# Patient Record
Sex: FEMALE | Race: WHITE | Employment: FULL TIME | URBAN - METROPOLITAN AREA
[De-identification: names, ages, dates, MRNs, and addresses within clinical notes are randomized per-mention and may not be internally consistent; named-entity substitution may affect disease eponyms.]

---

## 2017-02-06 ENCOUNTER — HOSPITAL ENCOUNTER (OUTPATIENT)
Dept: OTHER | Age: 57
Discharge: HOME | End: 2017-02-06
Attending: INTERNAL MEDICINE | Admitting: INTERNAL MEDICINE

## 2017-02-06 LAB
ALBUMIN SERPL-MCNC: 4.4 GM/DL (ref 3.4–5)
ALP BLD-CCNC: 130 IU/L (ref 40–129)
ALT SERPL-CCNC: 115 U/L (ref 10–40)
ANION GAP SERPL CALCULATED.3IONS-SCNC: 15 MMOL/L (ref 4–16)
AST SERPL-CCNC: 72 IU/L (ref 15–37)
BILIRUB SERPL-MCNC: 0.3 MG/DL (ref 0–1)
BUN BLDV-MCNC: 18 MG/DL (ref 6–23)
CALCIUM SERPL-MCNC: 9.5 MG/DL (ref 8.3–10.6)
CHLORIDE BLD-SCNC: 101 MMOL/L (ref 99–110)
CO2: 27 MMOL/L (ref 21–32)
CREAT SERPL-MCNC: 0.7 MG/DL (ref 0.6–1.1)
FERRITIN: 116 NG/ML (ref 15–150)
GFR AFRICAN AMERICAN: >60 ML/MIN/1.73M2
GFR NON-AFRICAN AMERICAN: >60 ML/MIN/1.73M2
GLUCOSE FASTING: 84 MG/DL (ref 70–99)
POTASSIUM SERPL-SCNC: 4.9 MMOL/L (ref 3.5–5.1)
SODIUM BLD-SCNC: 143 MMOL/L (ref 135–145)
TOTAL PROTEIN: 7.3 GM/DL (ref 6.4–8.2)

## 2017-05-15 ENCOUNTER — HOSPITAL ENCOUNTER (OUTPATIENT)
Dept: OTHER | Age: 57
Discharge: HOME | End: 2017-05-15
Attending: INTERNAL MEDICINE | Admitting: INTERNAL MEDICINE

## 2017-05-15 LAB
ALBUMIN SERPL-MCNC: 4.1 GM/DL (ref 3.4–5)
ALP BLD-CCNC: 99 IU/L (ref 40–129)
ALT SERPL-CCNC: 57 U/L (ref 10–40)
ANION GAP SERPL CALCULATED.3IONS-SCNC: 15 MMOL/L (ref 4–16)
AST SERPL-CCNC: 46 IU/L (ref 15–37)
BILIRUB SERPL-MCNC: 0.2 MG/DL (ref 0–1)
BUN BLDV-MCNC: 20 MG/DL (ref 6–23)
CALCIUM SERPL-MCNC: 8.9 MG/DL (ref 8.3–10.6)
CHLORIDE BLD-SCNC: 104 MMOL/L (ref 99–110)
CO2: 25 MMOL/L (ref 21–32)
CREAT SERPL-MCNC: 0.6 MG/DL (ref 0.6–1.1)
FERRITIN: 69 NG/ML (ref 15–150)
GFR AFRICAN AMERICAN: >60 ML/MIN/1.73M2
GFR NON-AFRICAN AMERICAN: >60 ML/MIN/1.73M2
GLUCOSE BLD-MCNC: 101 MG/DL (ref 70–140)
POTASSIUM SERPL-SCNC: 4.3 MMOL/L (ref 3.5–5.1)
SODIUM BLD-SCNC: 144 MMOL/L (ref 135–145)
TOTAL PROTEIN: 6.7 GM/DL (ref 6.4–8.2)

## 2017-05-20 ENCOUNTER — EMPLOYEE WELLNESS (OUTPATIENT)
Dept: OTHER | Age: 57
End: 2017-05-20

## 2017-05-20 LAB
CHOLESTEROL: 206 MG/DL
GLUCOSE BLD-MCNC: 93 MG/DL (ref 70–140)
HDLC SERPL-MCNC: 58 MG/DL
LDL CHOLESTEROL CALCULATED: 134 MG/DL
PATIENT FASTING?: YES
TRIGL SERPL-MCNC: 68 MG/DL

## 2017-11-13 ENCOUNTER — HOSPITAL ENCOUNTER (OUTPATIENT)
Dept: WOMENS IMAGING | Age: 57
Discharge: HOME | End: 2017-11-13
Attending: OBSTETRICS & GYNECOLOGY | Admitting: OBSTETRICS & GYNECOLOGY

## 2017-11-13 DIAGNOSIS — Z12.31 VISIT FOR SCREENING MAMMOGRAM: ICD-10-CM

## 2017-11-14 ENCOUNTER — HOSPITAL ENCOUNTER (OUTPATIENT)
Dept: OTHER | Age: 57
Discharge: HOME | End: 2017-11-14
Attending: INTERNAL MEDICINE | Admitting: INTERNAL MEDICINE

## 2017-11-14 LAB
ALBUMIN SERPL-MCNC: 4.3 GM/DL (ref 3.4–5)
ALP BLD-CCNC: 113 IU/L (ref 40–129)
ALT SERPL-CCNC: 84 U/L (ref 10–40)
ANION GAP SERPL CALCULATED.3IONS-SCNC: 10 MMOL/L (ref 4–16)
AST SERPL-CCNC: 35 IU/L (ref 15–37)
BILIRUB SERPL-MCNC: 0.3 MG/DL (ref 0–1)
BUN BLDV-MCNC: 17 MG/DL (ref 6–23)
CALCIUM SERPL-MCNC: 9.4 MG/DL (ref 8.3–10.6)
CHLORIDE BLD-SCNC: 102 MMOL/L (ref 99–110)
CO2: 27 MMOL/L (ref 21–32)
CREAT SERPL-MCNC: 0.6 MG/DL (ref 0.6–1.1)
FERRITIN: 57 NG/ML (ref 15–150)
GFR AFRICAN AMERICAN: >60 ML/MIN/1.73M2
GFR NON-AFRICAN AMERICAN: >60 ML/MIN/1.73M2
GLUCOSE BLD-MCNC: 95 MG/DL (ref 70–140)
POTASSIUM SERPL-SCNC: 4.2 MMOL/L (ref 3.5–5.1)
SODIUM BLD-SCNC: 139 MMOL/L (ref 135–145)
TOTAL PROTEIN: 7 GM/DL (ref 6.4–8.2)

## 2017-11-21 ENCOUNTER — HOSPITAL ENCOUNTER (OUTPATIENT)
Dept: ULTRASOUND IMAGING | Age: 57
Discharge: HOME | End: 2017-11-21
Attending: OBSTETRICS & GYNECOLOGY | Admitting: OBSTETRICS & GYNECOLOGY

## 2017-11-21 DIAGNOSIS — R92.8 ABNORMAL MAMMOGRAM: ICD-10-CM

## 2018-01-22 ENCOUNTER — OFFICE VISIT (OUTPATIENT)
Dept: BARIATRICS/WEIGHT MGMT | Age: 58
End: 2018-01-22

## 2018-01-22 VITALS
DIASTOLIC BLOOD PRESSURE: 88 MMHG | HEART RATE: 93 BPM | HEIGHT: 64 IN | WEIGHT: 207.3 LBS | SYSTOLIC BLOOD PRESSURE: 130 MMHG | BODY MASS INDEX: 35.39 KG/M2

## 2018-01-22 DIAGNOSIS — E66.9 OBESITY WITH BODY MASS INDEX (BMI) OF 35.0 TO 39.9 WITHOUT COMORBIDITY: Primary | ICD-10-CM

## 2018-01-22 PROCEDURE — 99999 PR OFFICE/OUTPT VISIT,PROCEDURE ONLY: CPT

## 2018-01-22 RX ORDER — ASCORBIC ACID 500 MG
500 TABLET ORAL NIGHTLY
COMMUNITY
End: 2018-11-23

## 2018-01-31 ENCOUNTER — OFFICE VISIT (OUTPATIENT)
Dept: BARIATRICS/WEIGHT MGMT | Age: 58
End: 2018-01-31

## 2018-01-31 VITALS
BODY MASS INDEX: 34.81 KG/M2 | DIASTOLIC BLOOD PRESSURE: 77 MMHG | HEIGHT: 64 IN | SYSTOLIC BLOOD PRESSURE: 129 MMHG | HEART RATE: 83 BPM | WEIGHT: 203.9 LBS

## 2018-01-31 DIAGNOSIS — E66.9 OBESITY (BMI 35.0-39.9 WITHOUT COMORBIDITY): Primary | ICD-10-CM

## 2018-01-31 PROCEDURE — 99999 PR OFFICE/OUTPT VISIT,PROCEDURE ONLY: CPT

## 2018-02-12 ENCOUNTER — HOSPITAL ENCOUNTER (OUTPATIENT)
Dept: OTHER | Age: 58
Discharge: HOME | End: 2018-02-12
Attending: NURSE PRACTITIONER | Admitting: NURSE PRACTITIONER

## 2018-02-12 LAB
ALBUMIN SERPL-MCNC: 4.5 GM/DL (ref 3.4–5)
ALP BLD-CCNC: 72 IU/L (ref 40–129)
ALT SERPL-CCNC: 28 U/L (ref 10–40)
ANION GAP SERPL CALCULATED.3IONS-SCNC: 12 MMOL/L (ref 4–16)
AST SERPL-CCNC: 28 IU/L (ref 15–37)
BILIRUB SERPL-MCNC: 0.4 MG/DL (ref 0–1)
BUN BLDV-MCNC: 16 MG/DL (ref 6–23)
CALCIUM SERPL-MCNC: 9.3 MG/DL (ref 8.3–10.6)
CHLORIDE BLD-SCNC: 99 MMOL/L (ref 99–110)
CO2: 29 MMOL/L (ref 21–32)
CREAT SERPL-MCNC: 0.7 MG/DL (ref 0.6–1.1)
FERRITIN: 51 NG/ML (ref 15–150)
GFR AFRICAN AMERICAN: >60 ML/MIN/1.73M2
GFR NON-AFRICAN AMERICAN: >60 ML/MIN/1.73M2
GLUCOSE BLD-MCNC: 93 MG/DL (ref 70–99)
POTASSIUM SERPL-SCNC: 3.7 MMOL/L (ref 3.5–5.1)
SODIUM BLD-SCNC: 140 MMOL/L (ref 135–145)
TOTAL PROTEIN: 7.3 GM/DL (ref 6.4–8.2)

## 2018-02-14 ENCOUNTER — OFFICE VISIT (OUTPATIENT)
Dept: BARIATRICS/WEIGHT MGMT | Age: 58
End: 2018-02-14

## 2018-02-14 VITALS
BODY MASS INDEX: 34.02 KG/M2 | DIASTOLIC BLOOD PRESSURE: 82 MMHG | WEIGHT: 199.3 LBS | HEART RATE: 83 BPM | HEIGHT: 64 IN | SYSTOLIC BLOOD PRESSURE: 142 MMHG

## 2018-02-14 DIAGNOSIS — E66.9 OBESITY (BMI 30.0-34.9): Primary | ICD-10-CM

## 2018-02-14 PROCEDURE — 99999 PR OFFICE/OUTPT VISIT,PROCEDURE ONLY: CPT

## 2018-03-20 VITALS — WEIGHT: 199 LBS | BODY MASS INDEX: 34.16 KG/M2

## 2018-04-25 ENCOUNTER — HOSPITAL ENCOUNTER (OUTPATIENT)
Dept: GENERAL RADIOLOGY | Age: 58
Discharge: HOME | End: 2018-04-25
Attending: FAMILY MEDICINE | Admitting: FAMILY MEDICINE

## 2018-04-25 DIAGNOSIS — M54.50 ACUTE MIDLINE LOW BACK PAIN WITHOUT SCIATICA: ICD-10-CM

## 2018-04-25 DIAGNOSIS — M54.5 LEFT LOW BACK PAIN, UNSPECIFIED CHRONICITY, WITH SCIATICA PRESENCE UNSPECIFIED: ICD-10-CM

## 2018-05-07 ENCOUNTER — EMPLOYEE WELLNESS (OUTPATIENT)
Dept: OTHER | Age: 58
End: 2018-05-07

## 2018-05-07 LAB
CHOLESTEROL: 176 MG/DL
GLUCOSE BLD-MCNC: 79 MG/DL (ref 70–99)
HDLC SERPL-MCNC: 59 MG/DL
LDL CHOLESTEROL CALCULATED: 105 MG/DL
PATIENT FASTING?: YES
TRIGL SERPL-MCNC: 61 MG/DL

## 2018-05-14 VITALS — WEIGHT: 201 LBS | BODY MASS INDEX: 34.5 KG/M2

## 2018-06-20 ENCOUNTER — HOSPITAL ENCOUNTER (OUTPATIENT)
Dept: OTHER | Age: 58
Discharge: HOME | End: 2018-06-20
Attending: INTERNAL MEDICINE | Admitting: INTERNAL MEDICINE

## 2018-06-20 LAB
ALBUMIN SERPL-MCNC: 3.9 GM/DL (ref 3.4–5)
ALP BLD-CCNC: 87 IU/L (ref 40–129)
ALT SERPL-CCNC: 24 U/L (ref 10–40)
ANION GAP SERPL CALCULATED.3IONS-SCNC: 14 MMOL/L (ref 4–16)
AST SERPL-CCNC: 20 IU/L (ref 15–37)
BILIRUB SERPL-MCNC: 0.2 MG/DL (ref 0–1)
BUN BLDV-MCNC: 15 MG/DL (ref 6–23)
CALCIUM SERPL-MCNC: 9.1 MG/DL (ref 8.3–10.6)
CHLORIDE BLD-SCNC: 101 MMOL/L (ref 99–110)
CO2: 25 MMOL/L (ref 21–32)
CREAT SERPL-MCNC: 0.6 MG/DL (ref 0.6–1.1)
FERRITIN: 6 NG/ML (ref 15–150)
FOLATE: 16.8 NG/ML (ref 3.1–17.5)
GFR AFRICAN AMERICAN: >60 ML/MIN/1.73M2
GFR NON-AFRICAN AMERICAN: >60 ML/MIN/1.73M2
GLUCOSE BLD-MCNC: 115 MG/DL (ref 70–99)
IRON: 13 UG/DL (ref 37–145)
LACTATE DEHYDROGENASE: 220 IU/L (ref 120–246)
PCT TRANSFERRIN: 3 % (ref 10–44)
POTASSIUM SERPL-SCNC: 4.4 MMOL/L (ref 3.5–5.1)
SODIUM BLD-SCNC: 140 MMOL/L (ref 135–145)
TOTAL IRON BINDING CAPACITY: 403 UG/DL (ref 250–450)
TOTAL PROTEIN: 6.7 GM/DL (ref 6.4–8.2)
TSH HIGH SENSITIVITY: 1.2 UIU/ML (ref 0.27–4.2)
UNSATURATED IRON BINDING CAPACITY: 390 UG/DL (ref 110–370)
VITAMIN B-12: 390.8 PG/ML (ref 211–911)
VITAMIN D 25-HYDROXY: 21.85 NG/ML

## 2018-06-22 LAB — VITAMIN D 1,25-DIHYDROXY: 44.6

## 2018-06-26 ENCOUNTER — HOSPITAL ENCOUNTER (OUTPATIENT)
Dept: INFUSION THERAPY | Age: 58
Discharge: HOME | End: 2018-06-26
Attending: INTERNAL MEDICINE | Admitting: INTERNAL MEDICINE

## 2018-06-26 VITALS
OXYGEN SATURATION: 98 % | HEART RATE: 76 BPM | DIASTOLIC BLOOD PRESSURE: 80 MMHG | RESPIRATION RATE: 16 BRPM | SYSTOLIC BLOOD PRESSURE: 122 MMHG | TEMPERATURE: 98.6 F

## 2018-06-26 RX ORDER — ACETAMINOPHEN 325 MG/1
650 TABLET ORAL SEE ADMIN INSTRUCTIONS
Status: COMPLETED | OUTPATIENT
Start: 2018-06-26 | End: 2018-06-26

## 2018-06-26 RX ORDER — METHYLPREDNISOLONE SODIUM SUCCINATE 40 MG/ML
20 INJECTION, POWDER, LYOPHILIZED, FOR SOLUTION INTRAMUSCULAR; INTRAVENOUS ONCE
Status: COMPLETED | OUTPATIENT
Start: 2018-06-26 | End: 2018-06-26

## 2018-06-26 RX ORDER — DIPHENHYDRAMINE HCL 25 MG
25 TABLET ORAL SEE ADMIN INSTRUCTIONS
Status: COMPLETED | OUTPATIENT
Start: 2018-06-26 | End: 2018-06-26

## 2018-06-26 RX ORDER — FUROSEMIDE 10 MG/ML
20 INJECTION INTRAMUSCULAR; INTRAVENOUS SEE ADMIN INSTRUCTIONS
Status: DISCONTINUED | OUTPATIENT
Start: 2018-06-26 | End: 2018-06-27 | Stop reason: HOSPADM

## 2018-06-26 RX ORDER — 0.9 % SODIUM CHLORIDE 0.9 %
10 VIAL (ML) INJECTION PRN
Status: ACTIVE | OUTPATIENT
Start: 2018-06-26 | End: 2018-06-26

## 2018-06-26 RX ORDER — 0.9 % SODIUM CHLORIDE 0.9 %
250 INTRAVENOUS SOLUTION INTRAVENOUS ONCE
Status: COMPLETED | OUTPATIENT
Start: 2018-06-26 | End: 2018-06-26

## 2018-06-26 RX ADMIN — Medication 10 ML: at 08:50

## 2018-06-26 RX ADMIN — ACETAMINOPHEN 650 MG: 325 TABLET ORAL at 09:00

## 2018-06-26 RX ADMIN — Medication 10 ML: at 09:00

## 2018-06-26 RX ADMIN — Medication 25 MG: at 09:00

## 2018-06-26 RX ADMIN — Medication 250 ML: at 09:00

## 2018-06-26 RX ADMIN — METHYLPREDNISOLONE SODIUM SUCCINATE 20 MG: 40 INJECTION, POWDER, LYOPHILIZED, FOR SOLUTION INTRAMUSCULAR; INTRAVENOUS at 09:00

## 2018-06-26 ASSESSMENT — PAIN SCALES - GENERAL: PAINLEVEL_OUTOF10: 0

## 2018-07-05 ENCOUNTER — HOSPITAL ENCOUNTER (OUTPATIENT)
Dept: INFUSION THERAPY | Age: 58
Discharge: HOME | End: 2018-07-31
Attending: INTERNAL MEDICINE | Admitting: INTERNAL MEDICINE

## 2018-07-05 VITALS
HEART RATE: 77 BPM | RESPIRATION RATE: 16 BRPM | SYSTOLIC BLOOD PRESSURE: 138 MMHG | TEMPERATURE: 98.2 F | DIASTOLIC BLOOD PRESSURE: 84 MMHG

## 2018-07-05 RX ORDER — DEXAMETHASONE SODIUM PHOSPHATE 4 MG/ML
8 INJECTION, SOLUTION INTRA-ARTICULAR; INTRALESIONAL; INTRAMUSCULAR; INTRAVENOUS; SOFT TISSUE ONCE
Status: COMPLETED | OUTPATIENT
Start: 2018-07-05 | End: 2018-07-05

## 2018-07-05 RX ORDER — SODIUM CHLORIDE 0.9 % (FLUSH) 0.9 %
10 SYRINGE (ML) INJECTION PRN
Status: DISCONTINUED | OUTPATIENT
Start: 2018-07-05 | End: 2018-08-01 | Stop reason: HOSPADM

## 2018-07-05 RX ORDER — ACETAMINOPHEN 325 MG/1
650 TABLET ORAL ONCE
Status: COMPLETED | OUTPATIENT
Start: 2018-07-05 | End: 2018-07-05

## 2018-07-05 RX ADMIN — ACETAMINOPHEN 650 MG: 325 TABLET ORAL at 10:12

## 2018-07-05 RX ADMIN — DEXAMETHASONE SODIUM PHOSPHATE 8 MG: 4 INJECTION, SOLUTION INTRA-ARTICULAR; INTRALESIONAL; INTRAMUSCULAR; INTRAVENOUS; SOFT TISSUE at 10:12

## 2018-07-05 RX ADMIN — Medication 10 ML: at 12:00

## 2018-07-05 ASSESSMENT — PAIN SCALES - GENERAL: PAINLEVEL_OUTOF10: 0

## 2018-07-05 NOTE — PROGRESS NOTES
IV discontinued. DSD applied. Pt tolerated well. Discharged instructions given to pt, pt voiced understanding. Pt discharged via ambulatory by self to exit.

## 2018-07-11 ENCOUNTER — HOSPITAL ENCOUNTER (OUTPATIENT)
Dept: INFUSION THERAPY | Age: 58
Discharge: HOME | End: 2018-07-11
Attending: INTERNAL MEDICINE

## 2018-07-11 VITALS — RESPIRATION RATE: 16 BRPM | HEART RATE: 80 BPM | DIASTOLIC BLOOD PRESSURE: 89 MMHG | SYSTOLIC BLOOD PRESSURE: 137 MMHG

## 2018-07-11 RX ORDER — 0.9 % SODIUM CHLORIDE 0.9 %
10 VIAL (ML) INJECTION PRN
Status: DISCONTINUED | OUTPATIENT
Start: 2018-07-11 | End: 2018-07-12 | Stop reason: HOSPADM

## 2018-07-11 RX ORDER — ACETAMINOPHEN 325 MG/1
650 TABLET ORAL ONCE
Status: COMPLETED | OUTPATIENT
Start: 2018-07-11 | End: 2018-07-11

## 2018-07-11 RX ORDER — DEXAMETHASONE SODIUM PHOSPHATE 4 MG/ML
8 INJECTION, SOLUTION INTRA-ARTICULAR; INTRALESIONAL; INTRAMUSCULAR; INTRAVENOUS; SOFT TISSUE ONCE
Status: COMPLETED | OUTPATIENT
Start: 2018-07-11 | End: 2018-07-11

## 2018-07-11 RX ADMIN — Medication 10 ML: at 11:30

## 2018-07-11 RX ADMIN — ACETAMINOPHEN 650 MG: 325 TABLET ORAL at 09:15

## 2018-07-11 RX ADMIN — Medication 10 ML: at 09:20

## 2018-07-11 RX ADMIN — DEXAMETHASONE SODIUM PHOSPHATE 8 MG: 4 INJECTION, SOLUTION INTRA-ARTICULAR; INTRALESIONAL; INTRAMUSCULAR; INTRAVENOUS; SOFT TISSUE at 09:15

## 2018-07-11 RX ADMIN — Medication 10 ML: at 09:05

## 2018-07-11 ASSESSMENT — PAIN SCALES - GENERAL: PAINLEVEL_OUTOF10: 0

## 2018-07-25 ENCOUNTER — NURSE TRIAGE (OUTPATIENT)
Dept: OTHER | Facility: CLINIC | Age: 58
End: 2018-07-25

## 2018-08-01 ENCOUNTER — OFFICE VISIT (OUTPATIENT)
Dept: BARIATRICS/WEIGHT MGMT | Age: 58
End: 2018-08-01

## 2018-08-01 ENCOUNTER — HOSPITAL ENCOUNTER (OUTPATIENT)
Dept: OTHER | Age: 58
Discharge: HOME | End: 2018-08-31
Attending: INTERNAL MEDICINE | Admitting: INTERNAL MEDICINE

## 2018-08-01 VITALS
DIASTOLIC BLOOD PRESSURE: 84 MMHG | SYSTOLIC BLOOD PRESSURE: 128 MMHG | WEIGHT: 207.2 LBS | HEIGHT: 64 IN | HEART RATE: 85 BPM | BODY MASS INDEX: 35.37 KG/M2

## 2018-08-01 DIAGNOSIS — K21.00 GASTROESOPHAGEAL REFLUX DISEASE WITH ESOPHAGITIS: Primary | ICD-10-CM

## 2018-08-01 DIAGNOSIS — K44.9 HIATAL HERNIA: ICD-10-CM

## 2018-08-01 PROCEDURE — 99204 OFFICE O/P NEW MOD 45 MIN: CPT | Performed by: SURGERY

## 2018-08-01 RX ORDER — ESOMEPRAZOLE MAGNESIUM 20 MG/1
20 FOR SUSPENSION ORAL DAILY
COMMUNITY
End: 2018-10-02

## 2018-08-01 RX ORDER — ACETAMINOPHEN 325 MG/1
650 TABLET ORAL EVERY 6 HOURS PRN
COMMUNITY
End: 2018-10-02

## 2018-08-01 ASSESSMENT — ENCOUNTER SYMPTOMS
SORE THROAT: 0
TROUBLE SWALLOWING: 0
PHOTOPHOBIA: 0
CONSTIPATION: 0
ABDOMINAL PAIN: 1
COUGH: 0
NAUSEA: 0
DIARRHEA: 0
ANAL BLEEDING: 0
VOMITING: 0
BLOOD IN STOOL: 0
SHORTNESS OF BREATH: 0
COLOR CHANGE: 0
WHEEZING: 0
VOICE CHANGE: 0

## 2018-08-01 NOTE — PROGRESS NOTES
tracheal deviation present. No thyromegaly present. Cardiovascular: Normal rate, regular rhythm, normal heart sounds and intact distal pulses. Exam reveals no gallop and no friction rub. No murmur heard. Pulmonary/Chest: Effort normal. No stridor. No respiratory distress. She has no wheezes. She has no rales. She exhibits no tenderness. Abdominal: Soft. Bowel sounds are normal. She exhibits no distension and no mass. There is tenderness (Epigastric). There is no rebound and no guarding. Musculoskeletal: Normal range of motion. She exhibits no edema or tenderness. Lymphadenopathy:     She has no cervical adenopathy. Neurological: She is alert and oriented to person, place, and time. No cranial nerve deficit. Coordination normal.   Skin: Skin is warm and dry. No rash noted. She is not diaphoretic. No erythema. No pallor. Psychiatric: Her behavior is normal. Judgment and thought content normal.   Vitals reviewed. ASSESSMENT & PLAN:    1. Gastroesophageal reflux disease with esophagitis    2. Hiatal hernia         Presenting with Hiatal Hernia seen on EGD 4/8/16 with Dr. Vito Lopez. She has been having Anemia. She recently on 6/26/18 had a blood transfusion and 7/5/18, 7/11/18, and 7/18/18 had iron infusions. She does have a current referral out for a Thomason study with Dr. Marito Ferreira. Questioning if the capsule endoscopy would be useful, I think the hiatal hernia needs to be fixed for her GERD and SOB, then after IF anemia does NOT resolve then a capsule endoscopy would be useful. AND fix her umbilical hernia. Patient counseled on the risks, benefits, and alternatives of treatment plan at length while in the office today. Patient states an understanding and willingness to proceed with the plan. Follow Up:  Return for Follow up Symptoms, Surgery. Patient was seen with total face to face time of  40 minutes.  More than 50% of this visit was counseling and education as above in my assessment

## 2018-09-24 ENCOUNTER — OFFICE VISIT (OUTPATIENT)
Dept: BARIATRICS/WEIGHT MGMT | Age: 58
End: 2018-09-24

## 2018-09-24 VITALS
SYSTOLIC BLOOD PRESSURE: 132 MMHG | WEIGHT: 211.2 LBS | HEART RATE: 84 BPM | BODY MASS INDEX: 37.42 KG/M2 | DIASTOLIC BLOOD PRESSURE: 82 MMHG | HEIGHT: 63 IN

## 2018-09-24 PROCEDURE — 99999 PR OFFICE/OUTPT VISIT,PROCEDURE ONLY: CPT

## 2018-09-24 NOTE — PROGRESS NOTES
Outpatient Nutrition Counseling    REASON FOR VISIT: Pre-Op Diet Education    Chief Complaint:    Chief Complaint   Patient presents with    Weight Management       SUBJECTIVE:  Pt here to start 2 week liquid protein shake diet in preparation [facility]. Instructed pt on pre-op diet and suggested post-op diet progression. Provided handouts and recipes. Pt voiced understanding of all info. To start diet on . The patient is a 62 y.o. female being seen for GERD, planning HH surgery; Simona's, Height: 5' 3\" (160 cm), Weight: 211 lb 3.2 oz (95.8 kg), Current Body mass index is 37.41 kg/m². The patient's PCP is Efren Stoner MD     Comorbid Conditions:  Significant diseases affecting this patient are   Past Medical History:   Diagnosis Date    Anemia     History of blood transfusion     Hypertension     Iron deficiency    . Review of Systems - ROS  Otherwise per HPI. Allergies: Allergies   Allergen Reactions    Codeine        Past Surgical History:  Past Surgical History:   Procedure Laterality Date     SECTION      CHOLECYSTECTOMY      COLONOSCOPY  9-10-13    normal    TONSILLECTOMY         Family History:  Family History   Problem Relation Age of Onset    Hypertension Mother     Cancer Father         Lung    Cancer Sister         Spine       Social History:  Social History     Social History    Marital status:      Spouse name: N/A    Number of children: N/A    Years of education: N/A     Occupational History    Not on file.      Social History Main Topics    Smoking status: Never Smoker    Smokeless tobacco: Never Used    Alcohol use No    Drug use: No    Sexual activity: Not on file     Other Topics Concern    Not on file     Social History Narrative    No narrative on file         OBJECTIVE:  Physical Exam   /82 (Site: Right Upper Arm, Position: Sitting, Cuff Size: Large Adult)   Pulse 84   Ht 5' 3\" (1.6 m)   Wt 211 lb 3.2 oz

## 2018-09-25 ENCOUNTER — ANESTHESIA EVENT (OUTPATIENT)
Dept: OPERATING ROOM | Age: 58
DRG: 328 | End: 2018-09-25
Payer: COMMERCIAL

## 2018-10-02 ENCOUNTER — HOSPITAL ENCOUNTER (OUTPATIENT)
Dept: PREADMISSION TESTING | Age: 58
Discharge: HOME | End: 2018-10-06
Payer: COMMERCIAL

## 2018-10-02 VITALS
TEMPERATURE: 97.6 F | DIASTOLIC BLOOD PRESSURE: 78 MMHG | BODY MASS INDEX: 36.32 KG/M2 | RESPIRATION RATE: 16 BRPM | WEIGHT: 197.38 LBS | HEIGHT: 62 IN | HEART RATE: 82 BPM | SYSTOLIC BLOOD PRESSURE: 128 MMHG | OXYGEN SATURATION: 97 %

## 2018-10-02 LAB
ANION GAP SERPL CALCULATED.3IONS-SCNC: 14 MMOL/L (ref 4–16)
BUN BLDV-MCNC: 20 MG/DL (ref 6–23)
CALCIUM SERPL-MCNC: 9.7 MG/DL (ref 8.3–10.6)
CHLORIDE BLD-SCNC: 99 MMOL/L (ref 99–110)
CO2: 27 MMOL/L (ref 21–32)
CREAT SERPL-MCNC: 0.7 MG/DL (ref 0.6–1.1)
GFR AFRICAN AMERICAN: >60 ML/MIN/1.73M2
GFR NON-AFRICAN AMERICAN: >60 ML/MIN/1.73M2
GLUCOSE BLD-MCNC: 95 MG/DL (ref 70–99)
HCT VFR BLD CALC: 37.5 % (ref 37–47)
HEMOGLOBIN: 11.9 GM/DL (ref 12.5–16)
MCH RBC QN AUTO: 27.6 PG (ref 27–31)
MCHC RBC AUTO-ENTMCNC: 31.7 % (ref 32–36)
MCV RBC AUTO: 87 FL (ref 78–100)
PDW BLD-RTO: 16 % (ref 11.7–14.9)
PLATELET # BLD: 364 K/CU MM (ref 140–440)
PMV BLD AUTO: 8.7 FL (ref 7.5–11.1)
POTASSIUM SERPL-SCNC: 3.8 MMOL/L (ref 3.5–5.1)
RBC # BLD: 4.31 M/CU MM (ref 4.2–5.4)
SODIUM BLD-SCNC: 140 MMOL/L (ref 135–145)
WBC # BLD: 4.5 K/CU MM (ref 4–10.5)

## 2018-10-02 PROCEDURE — 85027 COMPLETE CBC AUTOMATED: CPT

## 2018-10-02 PROCEDURE — 80048 BASIC METABOLIC PNL TOTAL CA: CPT

## 2018-10-02 RX ORDER — HEPARIN SODIUM 5000 [USP'U]/ML
5000 INJECTION, SOLUTION INTRAVENOUS; SUBCUTANEOUS ONCE
Status: CANCELLED | OUTPATIENT
Start: 2018-10-11

## 2018-10-02 RX ORDER — FERROUS SULFATE 325(65) MG
325 TABLET ORAL NIGHTLY
COMMUNITY
End: 2018-11-23

## 2018-10-02 RX ORDER — HYDROCHLOROTHIAZIDE 25 MG/1
25 TABLET ORAL NIGHTLY
COMMUNITY

## 2018-10-02 RX ORDER — CEFAZOLIN SODIUM 2 G/100ML
2 INJECTION, SOLUTION INTRAVENOUS ONCE
Status: CANCELLED | OUTPATIENT
Start: 2018-10-11

## 2018-10-02 ASSESSMENT — ENCOUNTER SYMPTOMS: SHORTNESS OF BREATH: 1

## 2018-10-08 ENCOUNTER — OFFICE VISIT (OUTPATIENT)
Dept: BARIATRICS/WEIGHT MGMT | Age: 58
End: 2018-10-08
Payer: COMMERCIAL

## 2018-10-08 VITALS
HEART RATE: 92 BPM | DIASTOLIC BLOOD PRESSURE: 70 MMHG | WEIGHT: 189.8 LBS | BODY MASS INDEX: 33.63 KG/M2 | SYSTOLIC BLOOD PRESSURE: 130 MMHG | RESPIRATION RATE: 14 BRPM | HEIGHT: 63 IN

## 2018-10-08 DIAGNOSIS — Z01.818 PRE-OP EVALUATION: Primary | ICD-10-CM

## 2018-10-08 DIAGNOSIS — K44.9 HIATAL HERNIA: ICD-10-CM

## 2018-10-08 PROCEDURE — 99212 OFFICE O/P EST SF 10 MIN: CPT | Performed by: NURSE PRACTITIONER

## 2018-10-08 ASSESSMENT — ENCOUNTER SYMPTOMS
NAUSEA: 1
TROUBLE SWALLOWING: 0
SHORTNESS OF BREATH: 0
EYE PAIN: 0
RHINORRHEA: 0
ABDOMINAL DISTENTION: 0
CHEST TIGHTNESS: 0
DIARRHEA: 0
WHEEZING: 0
ABDOMINAL PAIN: 0

## 2018-10-08 NOTE — PROGRESS NOTES
Rober Granados  1960  62 y.o. SUBJECT SANDY:    Chief Complaint   Patient presents with    Weight Management     Weigh in     Past Medical History:   Diagnosis Date    Acid reflux     Anemia     Arthritis     \"Hands, Hips\"    Backache     \"Sometimes, I Go To A Chriopractor Once A Month\"    Hiatal hernia     History of blood transfusion Last Infusion     No Reaction To Blood Transfusions Received    Hypertension     Shortness of breath on exertion     Teeth missing     Upper And Lower    Wears glasses     [place] teeth extracted     4 [place] Teeth Extracted In Past     Past Surgical History:   Procedure Laterality Date     SECTION      CHOLECYSTECTOMY, LAPAROSCOPIC      COLONOSCOPY  Last Done In     DILATION AND CURETTAGE OF UTERUS  \"Between  And \"    ENDOSCOPY, COLON, DIAGNOSTIC  Last Done In    [place] 9938    WISDOM TOOTH EXTRACTION      4 [place] Teeth Extracted In Past     Current Outpatient Prescriptions   Medication Sig Dispense Refill    hydrochlorothiazide (HYDRODIURIL) 25 MG tablet Take 25 mg by mouth nightly      ferrous sulfate 325 (65 Fe) MG tablet Take 325 mg by mouth nightly Over The Counter      esomeprazole (NEXIUM 24HR) 20 MG delayed release capsule Take 20 mg by mouth nightly Over The Counter      Acetaminophen (TYLENOL PO) Take by mouth as needed Over The Counter      vitamin C (ASCORBIC ACID) 500 MG tablet Take 500 mg by mouth nightly Over The Counter      Multiple Vitamins-Minerals (MULTIVITAMIN PO) Take by mouth nightly Over The Counter       No current facility-administered medications for this visit.       Facility-Administered Medications Ordered in Other Visits   Medication Dose Route Frequency Provider Last Rate Last Dose    sodium chloride flush 0.9 % injection 10 mL  10 mL Intravenous PRN Jessy Brown MD   10 mL at 18 1100     Family History   Problem Relation Age of Onset    Hypertension oz (89.5 kg)   09/24/18 211 lb 3.2 oz (95.8 kg)       Physical Exam   Constitutional: She is oriented to person, place, and time. She appears well-developed and well-nourished. Obese   HENT:   Head: Normocephalic and atraumatic. Right Ear: Hearing and ear canal normal.   Left Ear: Hearing and ear canal normal.   Nose: Nose normal.   Mouth/Throat: Uvula is midline and oropharynx is clear and moist.   Eyes: Pupils are equal, round, and reactive to light. Conjunctivae are normal.   Neck: Normal range of motion. Cardiovascular: Normal rate, regular rhythm and normal heart sounds. Pulmonary/Chest: Effort normal and breath sounds normal. She has no decreased breath sounds. She has no wheezes. She has no rhonchi. She has no rales. Abdominal: Soft. Bowel sounds are normal. There is no tenderness. Musculoskeletal: Normal range of motion. She exhibits no tenderness. In all 4 extremities. Neurological: She is alert and oriented to person, place, and time. She has normal strength. She exhibits normal muscle tone. GCS eye subscore is 4. GCS verbal subscore is 5. GCS motor subscore is 6. Skin: Skin is warm and dry. No rash noted. Psychiatric: She has a normal mood and affect. Her behavior is normal. Judgment normal.   Nursing note and vitals reviewed. ASSESSMENT/ PLAN:    1. Pre-op evaluation  - Has lost 22 lbs thus far on diet, will continue. Discussed replacing 1 meal wth baked chicken (plain) and vegetable (plain). Having some intermittent nausea with shakes. - Discussed broth as well. 2. Hiatal hernia  - Scheduled for repair.   - Continue 2 week diet and discussed post surgical diet etc and weight loss. - Consent obtained and verified. No orders of the defined types were placed in this encounter. Return in about 3 days (around 10/11/2018).     Irl Ru, CNP

## 2018-10-11 ENCOUNTER — HOSPITAL ENCOUNTER (INPATIENT)
Age: 58
LOS: 1 days | Discharge: HOME | DRG: 328 | End: 2018-10-12
Attending: SURGERY | Admitting: SURGERY
Payer: COMMERCIAL

## 2018-10-11 ENCOUNTER — ANESTHESIA (OUTPATIENT)
Dept: OPERATING ROOM | Age: 58
DRG: 328 | End: 2018-10-11
Payer: COMMERCIAL

## 2018-10-11 VITALS
TEMPERATURE: 98.6 F | DIASTOLIC BLOOD PRESSURE: 69 MMHG | SYSTOLIC BLOOD PRESSURE: 119 MMHG | RESPIRATION RATE: 4 BRPM | OXYGEN SATURATION: 100 %

## 2018-10-11 DIAGNOSIS — K44.9 HIATAL HERNIA: Primary | ICD-10-CM

## 2018-10-11 PROCEDURE — 3600000019 HC SURGERY ROBOT ADDTL 15MIN: Performed by: SURGERY

## 2018-10-11 PROCEDURE — 6360000002 HC RX W HCPCS: Performed by: SURGERY

## 2018-10-11 PROCEDURE — 2580000003 HC RX 258

## 2018-10-11 PROCEDURE — 6370000000 HC RX 637 (ALT 250 FOR IP): Performed by: SURGERY

## 2018-10-11 PROCEDURE — 3700000000 HC ANESTHESIA ATTENDED CARE: Performed by: SURGERY

## 2018-10-11 PROCEDURE — C9113 INJ PANTOPRAZOLE SODIUM, VIA: HCPCS | Performed by: SURGERY

## 2018-10-11 PROCEDURE — 2500000003 HC RX 250 WO HCPCS: Performed by: SURGERY

## 2018-10-11 PROCEDURE — 2580000003 HC RX 258: Performed by: ANESTHESIOLOGY

## 2018-10-11 PROCEDURE — 3700000001 HC ADD 15 MINUTES (ANESTHESIA): Performed by: SURGERY

## 2018-10-11 PROCEDURE — 96372 THER/PROPH/DIAG INJ SC/IM: CPT

## 2018-10-11 PROCEDURE — 6360000002 HC RX W HCPCS

## 2018-10-11 PROCEDURE — 0WUF4JZ SUPPLEMENT ABDOMINAL WALL WITH SYNTHETIC SUBSTITUTE, PERCUTANEOUS ENDOSCOPIC APPROACH: ICD-10-PCS | Performed by: SURGERY

## 2018-10-11 PROCEDURE — 7100000001 HC PACU RECOVERY - ADDTL 15 MIN: Performed by: SURGERY

## 2018-10-11 PROCEDURE — 2580000003 HC RX 258: Performed by: SURGERY

## 2018-10-11 PROCEDURE — 2500000003 HC RX 250 WO HCPCS: Performed by: NURSE ANESTHETIST, CERTIFIED REGISTERED

## 2018-10-11 PROCEDURE — 0DV44ZZ RESTRICTION OF ESOPHAGOGASTRIC JUNCTION, PERCUTANEOUS ENDOSCOPIC APPROACH: ICD-10-PCS | Performed by: SURGERY

## 2018-10-11 PROCEDURE — 6360000002 HC RX W HCPCS: Performed by: ANESTHESIOLOGY

## 2018-10-11 PROCEDURE — 96376 TX/PRO/DX INJ SAME DRUG ADON: CPT

## 2018-10-11 PROCEDURE — 2500000003 HC RX 250 WO HCPCS

## 2018-10-11 PROCEDURE — 6360000002 HC RX W HCPCS: Performed by: NURSE ANESTHETIST, CERTIFIED REGISTERED

## 2018-10-11 PROCEDURE — 2709999900 HC NON-CHARGEABLE SUPPLY: Performed by: SURGERY

## 2018-10-11 PROCEDURE — G0378 HOSPITAL OBSERVATION PER HR: HCPCS

## 2018-10-11 PROCEDURE — 7100000000 HC PACU RECOVERY - FIRST 15 MIN: Performed by: SURGERY

## 2018-10-11 PROCEDURE — C9290 INJ, BUPIVACAINE LIPOSOME: HCPCS | Performed by: SURGERY

## 2018-10-11 PROCEDURE — 96374 THER/PROPH/DIAG INJ IV PUSH: CPT

## 2018-10-11 PROCEDURE — 3600000009 HC SURGERY ROBOT BASE: Performed by: SURGERY

## 2018-10-11 PROCEDURE — C1713 ANCHOR/SCREW BN/BN,TIS/BN: HCPCS | Performed by: SURGERY

## 2018-10-11 PROCEDURE — 8E0W4CZ ROBOTIC ASSISTED PROCEDURE OF TRUNK REGION, PERCUTANEOUS ENDOSCOPIC APPROACH: ICD-10-PCS | Performed by: SURGERY

## 2018-10-11 PROCEDURE — S2900 ROBOTIC SURGICAL SYSTEM: HCPCS | Performed by: SURGERY

## 2018-10-11 PROCEDURE — 1200000000 HC SEMI PRIVATE

## 2018-10-11 PROCEDURE — 43282 LAP PARAESOPH HER RPR W/MESH: CPT | Performed by: SURGERY

## 2018-10-11 DEVICE — MESH HERN W7XL10CM SYN TISS REINF BIOABSRB DISP BIO-A: Type: IMPLANTABLE DEVICE | Site: ABDOMEN | Status: FUNCTIONAL

## 2018-10-11 RX ORDER — FENTANYL CITRATE 50 UG/ML
INJECTION, SOLUTION INTRAMUSCULAR; INTRAVENOUS PRN
Status: DISCONTINUED | OUTPATIENT
Start: 2018-10-11 | End: 2018-10-11 | Stop reason: SDUPTHER

## 2018-10-11 RX ORDER — DEXAMETHASONE SODIUM PHOSPHATE 4 MG/ML
INJECTION, SOLUTION INTRA-ARTICULAR; INTRALESIONAL; INTRAMUSCULAR; INTRAVENOUS; SOFT TISSUE PRN
Status: DISCONTINUED | OUTPATIENT
Start: 2018-10-11 | End: 2018-10-11 | Stop reason: SDUPTHER

## 2018-10-11 RX ORDER — BACITRACIN 50000 [USP'U]/1
50000 INJECTION, POWDER, LYOPHILIZED, FOR SOLUTION INTRAMUSCULAR ONCE
Status: DISCONTINUED | OUTPATIENT
Start: 2018-10-11 | End: 2018-10-11 | Stop reason: CLARIF

## 2018-10-11 RX ORDER — ROCURONIUM BROMIDE 10 MG/ML
INJECTION, SOLUTION INTRAVENOUS PRN
Status: DISCONTINUED | OUTPATIENT
Start: 2018-10-11 | End: 2018-10-11 | Stop reason: SDUPTHER

## 2018-10-11 RX ORDER — PROPOFOL 10 MG/ML
INJECTION, EMULSION INTRAVENOUS PRN
Status: DISCONTINUED | OUTPATIENT
Start: 2018-10-11 | End: 2018-10-11 | Stop reason: SDUPTHER

## 2018-10-11 RX ORDER — GENTAMICIN SULFATE 40 MG/ML
80 INJECTION, SOLUTION INTRAMUSCULAR; INTRAVENOUS ONCE
Status: DISCONTINUED | OUTPATIENT
Start: 2018-10-11 | End: 2018-10-11 | Stop reason: CLARIF

## 2018-10-11 RX ORDER — MAGNESIUM SULFATE 1 G/100ML
1 INJECTION INTRAVENOUS PRN
Status: DISCONTINUED | OUTPATIENT
Start: 2018-10-11 | End: 2018-10-12 | Stop reason: HOSPADM

## 2018-10-11 RX ORDER — CEFAZOLIN SODIUM 2 G/100ML
2 INJECTION, SOLUTION INTRAVENOUS EVERY 8 HOURS
Status: COMPLETED | OUTPATIENT
Start: 2018-10-11 | End: 2018-10-12

## 2018-10-11 RX ORDER — HEPARIN SODIUM 5000 [USP'U]/ML
5000 INJECTION, SOLUTION INTRAVENOUS; SUBCUTANEOUS ONCE
Status: COMPLETED | OUTPATIENT
Start: 2018-10-11 | End: 2018-10-11

## 2018-10-11 RX ORDER — CEFAZOLIN SODIUM 2 G/100ML
2 INJECTION, SOLUTION INTRAVENOUS ONCE
Status: COMPLETED | OUTPATIENT
Start: 2018-10-11 | End: 2018-10-11

## 2018-10-11 RX ORDER — ACETAMINOPHEN 650 MG/1
650 SUPPOSITORY RECTAL EVERY 4 HOURS PRN
Status: DISCONTINUED | OUTPATIENT
Start: 2018-10-11 | End: 2018-10-12 | Stop reason: HOSPADM

## 2018-10-11 RX ORDER — ONDANSETRON 2 MG/ML
4 INJECTION INTRAMUSCULAR; INTRAVENOUS EVERY 4 HOURS PRN
Status: DISCONTINUED | OUTPATIENT
Start: 2018-10-11 | End: 2018-10-12 | Stop reason: HOSPADM

## 2018-10-11 RX ORDER — DOCUSATE SODIUM 100 MG/1
100 CAPSULE, LIQUID FILLED ORAL 2 TIMES DAILY
Status: DISCONTINUED | OUTPATIENT
Start: 2018-10-11 | End: 2018-10-12 | Stop reason: HOSPADM

## 2018-10-11 RX ORDER — HYDROMORPHONE HCL 110MG/55ML
1 PATIENT CONTROLLED ANALGESIA SYRINGE INTRAVENOUS
Status: DISCONTINUED | OUTPATIENT
Start: 2018-10-11 | End: 2018-10-12 | Stop reason: HOSPADM

## 2018-10-11 RX ORDER — DEXTROSE, SODIUM CHLORIDE, AND POTASSIUM CHLORIDE 5; .45; .15 G/100ML; G/100ML; G/100ML
INJECTION INTRAVENOUS CONTINUOUS
Status: DISCONTINUED | OUTPATIENT
Start: 2018-10-11 | End: 2018-10-12 | Stop reason: HOSPADM

## 2018-10-11 RX ORDER — BISACODYL 10 MG
10 SUPPOSITORY, RECTAL RECTAL DAILY PRN
Status: DISCONTINUED | OUTPATIENT
Start: 2018-10-11 | End: 2018-10-12 | Stop reason: HOSPADM

## 2018-10-11 RX ORDER — SODIUM CHLORIDE 0.9 % (FLUSH) 0.9 %
10 SYRINGE (ML) INJECTION EVERY 12 HOURS SCHEDULED
Status: DISCONTINUED | OUTPATIENT
Start: 2018-10-11 | End: 2018-10-12 | Stop reason: HOSPADM

## 2018-10-11 RX ORDER — SODIUM CHLORIDE, SODIUM LACTATE, POTASSIUM CHLORIDE, CALCIUM CHLORIDE 600; 310; 30; 20 MG/100ML; MG/100ML; MG/100ML; MG/100ML
INJECTION, SOLUTION INTRAVENOUS CONTINUOUS
Status: DISCONTINUED | OUTPATIENT
Start: 2018-10-11 | End: 2018-10-12 | Stop reason: HOSPADM

## 2018-10-11 RX ORDER — ACETAMINOPHEN 10 MG/ML
1000 INJECTION, SOLUTION INTRAVENOUS ONCE
Status: COMPLETED | OUTPATIENT
Start: 2018-10-11 | End: 2018-10-11

## 2018-10-11 RX ORDER — SODIUM CHLORIDE, SODIUM LACTATE, POTASSIUM CHLORIDE, CALCIUM CHLORIDE 600; 310; 30; 20 MG/100ML; MG/100ML; MG/100ML; MG/100ML
INJECTION, SOLUTION INTRAVENOUS
Status: COMPLETED
Start: 2018-10-11 | End: 2018-10-11

## 2018-10-11 RX ORDER — FENTANYL CITRATE 50 UG/ML
50 INJECTION, SOLUTION INTRAMUSCULAR; INTRAVENOUS EVERY 5 MIN PRN
Status: DISCONTINUED | OUTPATIENT
Start: 2018-10-11 | End: 2018-10-12 | Stop reason: HOSPADM

## 2018-10-11 RX ORDER — HYDROCHLOROTHIAZIDE 25 MG/1
25 TABLET ORAL NIGHTLY
Status: DISCONTINUED | OUTPATIENT
Start: 2018-10-11 | End: 2018-10-12 | Stop reason: HOSPADM

## 2018-10-11 RX ORDER — PANTOPRAZOLE SODIUM 40 MG/10ML
40 INJECTION, POWDER, LYOPHILIZED, FOR SOLUTION INTRAVENOUS 2 TIMES DAILY
Status: DISCONTINUED | OUTPATIENT
Start: 2018-10-11 | End: 2018-10-12 | Stop reason: HOSPADM

## 2018-10-11 RX ORDER — PROMETHAZINE HYDROCHLORIDE 25 MG/ML
12.5 INJECTION, SOLUTION INTRAMUSCULAR; INTRAVENOUS EVERY 6 HOURS PRN
Status: DISCONTINUED | OUTPATIENT
Start: 2018-10-11 | End: 2018-10-12 | Stop reason: HOSPADM

## 2018-10-11 RX ORDER — SODIUM CHLORIDE 0.9 % (FLUSH) 0.9 %
10 SYRINGE (ML) INJECTION PRN
Status: DISCONTINUED | OUTPATIENT
Start: 2018-10-11 | End: 2018-10-12 | Stop reason: HOSPADM

## 2018-10-11 RX ORDER — ACETAMINOPHEN 325 MG/1
650 TABLET ORAL EVERY 4 HOURS PRN
Status: DISCONTINUED | OUTPATIENT
Start: 2018-10-11 | End: 2018-10-12 | Stop reason: HOSPADM

## 2018-10-11 RX ORDER — ONDANSETRON 2 MG/ML
INJECTION INTRAMUSCULAR; INTRAVENOUS PRN
Status: DISCONTINUED | OUTPATIENT
Start: 2018-10-11 | End: 2018-10-11 | Stop reason: SDUPTHER

## 2018-10-11 RX ORDER — POTASSIUM CHLORIDE 7.45 MG/ML
10 INJECTION INTRAVENOUS PRN
Status: DISCONTINUED | OUTPATIENT
Start: 2018-10-11 | End: 2018-10-12 | Stop reason: HOSPADM

## 2018-10-11 RX ORDER — LIDOCAINE HYDROCHLORIDE 20 MG/ML
INJECTION, SOLUTION INFILTRATION; PERINEURAL PRN
Status: DISCONTINUED | OUTPATIENT
Start: 2018-10-11 | End: 2018-10-11 | Stop reason: SDUPTHER

## 2018-10-11 RX ADMIN — PHENYLEPHRINE HYDROCHLORIDE 100 MCG: 10 INJECTION INTRAVENOUS at 13:04

## 2018-10-11 RX ADMIN — FENTANYL CITRATE 100 MCG: 50 INJECTION INTRAMUSCULAR; INTRAVENOUS at 11:57

## 2018-10-11 RX ADMIN — DOCUSATE SODIUM 100 MG: 100 CAPSULE, LIQUID FILLED ORAL at 21:39

## 2018-10-11 RX ADMIN — PHENYLEPHRINE HYDROCHLORIDE 50 MCG: 10 INJECTION INTRAVENOUS at 13:19

## 2018-10-11 RX ADMIN — PHENYLEPHRINE HYDROCHLORIDE 100 MCG: 10 INJECTION INTRAVENOUS at 12:34

## 2018-10-11 RX ADMIN — FENTANYL CITRATE 50 MCG: 50 INJECTION, SOLUTION INTRAMUSCULAR; INTRAVENOUS at 14:35

## 2018-10-11 RX ADMIN — FENTANYL CITRATE 50 MCG: 50 INJECTION INTRAMUSCULAR; INTRAVENOUS at 13:31

## 2018-10-11 RX ADMIN — FENTANYL CITRATE 50 MCG: 50 INJECTION, SOLUTION INTRAMUSCULAR; INTRAVENOUS at 14:59

## 2018-10-11 RX ADMIN — PROPOFOL 160 MG: 10 INJECTION, EMULSION INTRAVENOUS at 11:42

## 2018-10-11 RX ADMIN — ROCURONIUM BROMIDE 5 MG: 50 INJECTION, SOLUTION INTRAVENOUS at 13:13

## 2018-10-11 RX ADMIN — SUGAMMADEX 200 MG: 100 INJECTION, SOLUTION INTRAVENOUS at 13:41

## 2018-10-11 RX ADMIN — Medication 4 MG: at 13:32

## 2018-10-11 RX ADMIN — FENTANYL CITRATE 100 MCG: 50 INJECTION INTRAMUSCULAR; INTRAVENOUS at 11:41

## 2018-10-11 RX ADMIN — ROCURONIUM BROMIDE 50 MG: 50 INJECTION, SOLUTION INTRAVENOUS at 11:42

## 2018-10-11 RX ADMIN — ROCURONIUM BROMIDE 5 MG: 50 INJECTION, SOLUTION INTRAVENOUS at 12:59

## 2018-10-11 RX ADMIN — ROCURONIUM BROMIDE 5 MG: 50 INJECTION, SOLUTION INTRAVENOUS at 13:26

## 2018-10-11 RX ADMIN — DEXTROSE MONOHYDRATE, SODIUM CHLORIDE, AND POTASSIUM CHLORIDE: 50; 4.5; 1.49 INJECTION, SOLUTION INTRAVENOUS at 14:55

## 2018-10-11 RX ADMIN — SODIUM CHLORIDE, POTASSIUM CHLORIDE, SODIUM LACTATE AND CALCIUM CHLORIDE: 600; 310; 30; 20 INJECTION, SOLUTION INTRAVENOUS at 13:10

## 2018-10-11 RX ADMIN — SODIUM CHLORIDE, POTASSIUM CHLORIDE, SODIUM LACTATE AND CALCIUM CHLORIDE: 600; 310; 30; 20 INJECTION, SOLUTION INTRAVENOUS at 10:40

## 2018-10-11 RX ADMIN — CEFAZOLIN SODIUM 2 G: 2 INJECTION, SOLUTION INTRAVENOUS at 11:48

## 2018-10-11 RX ADMIN — LIDOCAINE HYDROCHLORIDE 80 MG: 20 INJECTION, SOLUTION INFILTRATION; PERINEURAL at 11:42

## 2018-10-11 RX ADMIN — PANTOPRAZOLE SODIUM 40 MG: 40 INJECTION, POWDER, FOR SOLUTION INTRAVENOUS at 14:26

## 2018-10-11 RX ADMIN — HEPARIN SODIUM 5000 UNITS: 5000 INJECTION, SOLUTION INTRAVENOUS; SUBCUTANEOUS at 10:59

## 2018-10-11 RX ADMIN — ACETAMINOPHEN 1000 MG: 10 INJECTION, SOLUTION INTRAVENOUS at 14:37

## 2018-10-11 RX ADMIN — METRONIDAZOLE 500 MG: 500 INJECTION, SOLUTION INTRAVENOUS at 11:57

## 2018-10-11 RX ADMIN — CEFAZOLIN SODIUM 2 G: 2 INJECTION, SOLUTION INTRAVENOUS at 21:40

## 2018-10-11 RX ADMIN — PHENYLEPHRINE HYDROCHLORIDE 100 MCG: 10 INJECTION INTRAVENOUS at 12:30

## 2018-10-11 RX ADMIN — HYDROCHLOROTHIAZIDE 25 MG: 25 TABLET ORAL at 21:39

## 2018-10-11 RX ADMIN — DEXAMETHASONE SODIUM PHOSPHATE 8 MG: 4 INJECTION, SOLUTION INTRAMUSCULAR; INTRAVENOUS at 12:02

## 2018-10-11 RX ADMIN — PHENYLEPHRINE HYDROCHLORIDE 50 MCG: 10 INJECTION INTRAVENOUS at 13:10

## 2018-10-11 RX ADMIN — PANTOPRAZOLE SODIUM 40 MG: 40 INJECTION, POWDER, FOR SOLUTION INTRAVENOUS at 21:40

## 2018-10-11 ASSESSMENT — PULMONARY FUNCTION TESTS
PIF_VALUE: 25
PIF_VALUE: 31
PIF_VALUE: 29
PIF_VALUE: 25
PIF_VALUE: 1
PIF_VALUE: 29
PIF_VALUE: 30
PIF_VALUE: 30
PIF_VALUE: 16
PIF_VALUE: 28
PIF_VALUE: 31
PIF_VALUE: 1
PIF_VALUE: 8
PIF_VALUE: 29
PIF_VALUE: 32
PIF_VALUE: 11
PIF_VALUE: 11
PIF_VALUE: 16
PIF_VALUE: 28
PIF_VALUE: 30
PIF_VALUE: 31
PIF_VALUE: 31
PIF_VALUE: 30
PIF_VALUE: 22
PIF_VALUE: 30
PIF_VALUE: 30
PIF_VALUE: 21
PIF_VALUE: 30
PIF_VALUE: 25
PIF_VALUE: 29
PIF_VALUE: 30
PIF_VALUE: 16
PIF_VALUE: 23
PIF_VALUE: 13
PIF_VALUE: 21
PIF_VALUE: 15
PIF_VALUE: 17
PIF_VALUE: 21
PIF_VALUE: 23
PIF_VALUE: 27
PIF_VALUE: 16
PIF_VALUE: 31
PIF_VALUE: 31
PIF_VALUE: 29
PIF_VALUE: 30
PIF_VALUE: 24
PIF_VALUE: 22
PIF_VALUE: 16
PIF_VALUE: 32
PIF_VALUE: 23
PIF_VALUE: 24
PIF_VALUE: 16
PIF_VALUE: 31
PIF_VALUE: 0
PIF_VALUE: 30
PIF_VALUE: 23
PIF_VALUE: 29
PIF_VALUE: 31
PIF_VALUE: 22
PIF_VALUE: 1
PIF_VALUE: 2
PIF_VALUE: 30
PIF_VALUE: 32
PIF_VALUE: 16
PIF_VALUE: 16
PIF_VALUE: 23
PIF_VALUE: 23
PIF_VALUE: 32
PIF_VALUE: 32
PIF_VALUE: 23
PIF_VALUE: 29
PIF_VALUE: 22
PIF_VALUE: 29
PIF_VALUE: 32
PIF_VALUE: 30
PIF_VALUE: 24
PIF_VALUE: 31
PIF_VALUE: 29
PIF_VALUE: 16
PIF_VALUE: 30
PIF_VALUE: 16
PIF_VALUE: 30
PIF_VALUE: 18
PIF_VALUE: 24
PIF_VALUE: 21
PIF_VALUE: 15
PIF_VALUE: 24
PIF_VALUE: 23
PIF_VALUE: 29
PIF_VALUE: 0
PIF_VALUE: 29
PIF_VALUE: 25
PIF_VALUE: 30
PIF_VALUE: 22
PIF_VALUE: 24
PIF_VALUE: 24
PIF_VALUE: 29
PIF_VALUE: 31
PIF_VALUE: 33
PIF_VALUE: 24
PIF_VALUE: 30
PIF_VALUE: 28
PIF_VALUE: 29
PIF_VALUE: 30
PIF_VALUE: 29
PIF_VALUE: 3
PIF_VALUE: 1
PIF_VALUE: 30
PIF_VALUE: 21
PIF_VALUE: 30
PIF_VALUE: 30
PIF_VALUE: 24
PIF_VALUE: 30
PIF_VALUE: 21
PIF_VALUE: 28
PIF_VALUE: 31
PIF_VALUE: 30
PIF_VALUE: 23
PIF_VALUE: 31
PIF_VALUE: 30
PIF_VALUE: 30
PIF_VALUE: 1
PIF_VALUE: 1
PIF_VALUE: 30
PIF_VALUE: 0
PIF_VALUE: 30
PIF_VALUE: 32
PIF_VALUE: 0
PIF_VALUE: 18
PIF_VALUE: 30
PIF_VALUE: 16
PIF_VALUE: 32

## 2018-10-11 ASSESSMENT — PAIN SCALES - GENERAL
PAINLEVEL_OUTOF10: 0
PAINLEVEL_OUTOF10: 6
PAINLEVEL_OUTOF10: 6
PAINLEVEL_OUTOF10: 3
PAINLEVEL_OUTOF10: 4
PAINLEVEL_OUTOF10: 3

## 2018-10-11 ASSESSMENT — PAIN DESCRIPTION - PAIN TYPE
TYPE: SURGICAL PAIN

## 2018-10-11 ASSESSMENT — PAIN DESCRIPTION - LOCATION
LOCATION: SHOULDER

## 2018-10-11 ASSESSMENT — PAIN DESCRIPTION - DESCRIPTORS
DESCRIPTORS: DISCOMFORT
DESCRIPTORS: ACHING
DESCRIPTORS: ACHING
DESCRIPTORS: DISCOMFORT
DESCRIPTORS: DISCOMFORT

## 2018-10-11 ASSESSMENT — PAIN DESCRIPTION - FREQUENCY
FREQUENCY: CONTINUOUS

## 2018-10-11 ASSESSMENT — PAIN DESCRIPTION - ORIENTATION
ORIENTATION: RIGHT;LEFT

## 2018-10-11 ASSESSMENT — PAIN - FUNCTIONAL ASSESSMENT: PAIN_FUNCTIONAL_ASSESSMENT: 0-10

## 2018-10-11 NOTE — PROGRESS NOTES
HISTORY AND PHYSICAL EXAM    Date of Admission:  (Not on file)    PCP:  Hong Carroll MD    Chief Complaint / History of Present Illness:  Jodie Washburn is a very pleasant 62 y.o. female who presents for scheduled HH repair and nissen fundoplication for GERD and large HH noted on prior EGD. She successfully lost 22 lbs on 2 week liver shrinking diet. Continue NPO/Bowel rest, IV Fluids, Pain control, Nausea control, IV Antibiotics. PMH:   has a past medical history of Acid reflux; Anemia; Arthritis; Backache; Hiatal hernia; History of blood transfusion (Last Infusion ); Hypertension; Shortness of breath on exertion; Teeth missing; Wears glasses; and [place] teeth extracted. PSH:   has a past surgical history that includes  section (); Colonoscopy (Last Done In ); Endoscopy, colon, diagnostic (Last Done In ); [place] tooth extraction; Dilation and curettage of uterus (\"Between  And \"); Tonsillectomy and adenoidectomy (); and Cholecystectomy, laparoscopic (). Allergies: Allergies   Allergen Reactions    Codeine      \"Chest Pain\"    Ultram [Tramadol] Nausea Only        Home Meds:    Prior to Admission medications    Medication Sig Start Date End Date Taking?  Authorizing Provider   hydrochlorothiazide (HYDRODIURIL) 25 MG tablet Take 25 mg by mouth nightly    Historical Provider, MD   ferrous sulfate 325 (65 Fe) MG tablet Take 325 mg by mouth nightly Over The Counter    Historical Provider, MD   esomeprazole (NEXIUM 24HR) 20 MG delayed release capsule Take 20 mg by mouth nightly Over The Counter    Historical Provider, MD   Acetaminophen (TYLENOL PO) Take by mouth as needed Over The Counter    Historical Provider, MD   vitamin C (ASCORBIC ACID) 500 MG tablet Take 500 mg by mouth nightly Over The Counter    Historical Provider, MD   Multiple Vitamins-Minerals (MULTIVITAMIN PO) Take by mouth nightly Over The Counter    Historical Provider, MD for joint swelling and arthralgias. Skin: Warm and dry, well perfused. Neurological: Negative for seizures, syncope, speech difficulty and weakness. Hematological/Lymphatic: Negative for adenopathy. No history of DVT/PE. Does not bruise/bleed easily. Psychiatric/Behavioral: Negative for agitation. Physical Exam:  Vital Signs: There were no vitals filed for this visit. There is no height or weight on file to calculate BMI. General appearance: Pt Alert and oriented, in no apparent acute distress. HEENT:  DIMITRIOS, EOMI, No JVDs, Bruits, Megalies. Lungs: Clear to auscultation bilaterally. Heart: Regular rate and rhythm, S1, S2 normal, no murmur, rub or gallop. Abdomen: Non tender. Non distended. Positive bowel sounds. No hernias noted, no masses palpable. Extremities: Warm, well perfused, no cyanosis or edema. Skin: Skin color, texture, turgor normal.  Neurologic: Grossly normal, Cranial nerves from II to XII intact. Radiologic / Imaging / TESTING  No results found. Labs:    No results found for this or any previous visit (from the past 24 hour(s)). Diagnosis:  Patient Active Problem List   Diagnosis    Colon cancer screening    Iron deficiency anemia due to chronic blood loss    Gastroesophageal reflux disease with esophagitis    Hiatal hernia     4/11/2016:          Assessment & Plan:  Carina Thornton is a 62 y.o. female who presents today for scheduled robotic hiatal hernia repair and nissen fundoplication for GERD. Plan: Hiatal Hernia seen on EGD 4/8/16 with Dr. Geno Garcia. She has been having Anemia. She recently on 6/26/18 had a blood transfusion and 7/5/18, 7/11/18, and 7/18/18 had iron infusions. She needs hiatal herniorrhaphy, and I'll fix her umbilical hernia. Labs and HGB stable 11.9 on recent labs. Will proceed today with robotic hiatal hernia repair and nissen fundoplication. She successfully lost 22 lbs on 2 week liver shrinking diet.  Consent obtained and

## 2018-10-12 ENCOUNTER — CARE COORDINATION (OUTPATIENT)
Dept: CASE MANAGEMENT | Age: 58
End: 2018-10-12

## 2018-10-12 VITALS
HEIGHT: 62 IN | SYSTOLIC BLOOD PRESSURE: 99 MMHG | HEART RATE: 68 BPM | WEIGHT: 197 LBS | DIASTOLIC BLOOD PRESSURE: 56 MMHG | OXYGEN SATURATION: 94 % | RESPIRATION RATE: 18 BRPM | BODY MASS INDEX: 36.25 KG/M2 | TEMPERATURE: 97.9 F

## 2018-10-12 DIAGNOSIS — K44.9 HIATAL HERNIA: Primary | ICD-10-CM

## 2018-10-12 LAB
ANION GAP SERPL CALCULATED.3IONS-SCNC: 11 MMOL/L (ref 4–16)
BASOPHILS ABSOLUTE: 0 K/CU MM
BASOPHILS RELATIVE PERCENT: 0.3 % (ref 0–1)
BUN BLDV-MCNC: 8 MG/DL (ref 6–23)
CALCIUM SERPL-MCNC: 8.5 MG/DL (ref 8.3–10.6)
CHLORIDE BLD-SCNC: 100 MMOL/L (ref 99–110)
CO2: 25 MMOL/L (ref 21–32)
CREAT SERPL-MCNC: 0.6 MG/DL (ref 0.6–1.1)
DIFFERENTIAL TYPE: ABNORMAL
EOSINOPHILS ABSOLUTE: 0 K/CU MM
EOSINOPHILS RELATIVE PERCENT: 0.2 % (ref 0–3)
GFR AFRICAN AMERICAN: >60 ML/MIN/1.73M2
GFR NON-AFRICAN AMERICAN: >60 ML/MIN/1.73M2
GLUCOSE BLD-MCNC: 136 MG/DL (ref 70–99)
HCT VFR BLD CALC: 33.4 % (ref 37–47)
HEMOGLOBIN: 10.7 GM/DL (ref 12.5–16)
IMMATURE NEUTROPHIL %: 0.2 % (ref 0–0.43)
LYMPHOCYTES ABSOLUTE: 1.3 K/CU MM
LYMPHOCYTES RELATIVE PERCENT: 13.4 % (ref 24–44)
MCH RBC QN AUTO: 27.1 PG (ref 27–31)
MCHC RBC AUTO-ENTMCNC: 32 % (ref 32–36)
MCV RBC AUTO: 84.6 FL (ref 78–100)
MONOCYTES ABSOLUTE: 0.8 K/CU MM
MONOCYTES RELATIVE PERCENT: 8.4 % (ref 0–4)
NUCLEATED RBC %: 0 %
PDW BLD-RTO: 14.5 % (ref 11.7–14.9)
PLATELET # BLD: 331 K/CU MM (ref 140–440)
PMV BLD AUTO: 9.2 FL (ref 7.5–11.1)
POTASSIUM SERPL-SCNC: 3.6 MMOL/L (ref 3.5–5.1)
RBC # BLD: 3.95 M/CU MM (ref 4.2–5.4)
SEGMENTED NEUTROPHILS ABSOLUTE COUNT: 7.6 K/CU MM
SEGMENTED NEUTROPHILS RELATIVE PERCENT: 77.5 % (ref 36–66)
SODIUM BLD-SCNC: 136 MMOL/L (ref 135–145)
TOTAL IMMATURE NEUTOROPHIL: 0.02 K/CU MM
TOTAL NUCLEATED RBC: 0 K/CU MM
WBC # BLD: 9.8 K/CU MM (ref 4–10.5)

## 2018-10-12 PROCEDURE — 6370000000 HC RX 637 (ALT 250 FOR IP): Performed by: SURGERY

## 2018-10-12 PROCEDURE — G0008 ADMIN INFLUENZA VIRUS VAC: HCPCS | Performed by: SURGERY

## 2018-10-12 PROCEDURE — 80048 BASIC METABOLIC PNL TOTAL CA: CPT

## 2018-10-12 PROCEDURE — 36415 COLL VENOUS BLD VENIPUNCTURE: CPT

## 2018-10-12 PROCEDURE — 90686 IIV4 VACC NO PRSV 0.5 ML IM: CPT | Performed by: SURGERY

## 2018-10-12 PROCEDURE — 96376 TX/PRO/DX INJ SAME DRUG ADON: CPT

## 2018-10-12 PROCEDURE — C9113 INJ PANTOPRAZOLE SODIUM, VIA: HCPCS | Performed by: SURGERY

## 2018-10-12 PROCEDURE — 2500000003 HC RX 250 WO HCPCS: Performed by: SURGERY

## 2018-10-12 PROCEDURE — 85025 COMPLETE CBC W/AUTO DIFF WBC: CPT

## 2018-10-12 PROCEDURE — G0378 HOSPITAL OBSERVATION PER HR: HCPCS

## 2018-10-12 PROCEDURE — 6360000002 HC RX W HCPCS: Performed by: SURGERY

## 2018-10-12 PROCEDURE — 96372 THER/PROPH/DIAG INJ SC/IM: CPT

## 2018-10-12 RX ORDER — OXYCODONE HYDROCHLORIDE AND ACETAMINOPHEN 5; 325 MG/1; MG/1
1 TABLET ORAL EVERY 6 HOURS PRN
Qty: 16 TABLET | Refills: 0 | Status: SHIPPED | OUTPATIENT
Start: 2018-10-12 | End: 2018-10-16

## 2018-10-12 RX ORDER — AMOXICILLIN 250 MG
1 CAPSULE ORAL DAILY
Qty: 20 TABLET | Refills: 0 | Status: SHIPPED | OUTPATIENT
Start: 2018-10-12 | End: 2018-11-23

## 2018-10-12 RX ORDER — ONDANSETRON 4 MG/1
4 TABLET, FILM COATED ORAL EVERY 8 HOURS PRN
Qty: 21 TABLET | Refills: 0 | Status: SHIPPED | OUTPATIENT
Start: 2018-10-12 | End: 2018-11-23

## 2018-10-12 RX ADMIN — DEXTROSE MONOHYDRATE, SODIUM CHLORIDE, AND POTASSIUM CHLORIDE: 50; 4.5; 1.49 INJECTION, SOLUTION INTRAVENOUS at 00:21

## 2018-10-12 RX ADMIN — ENOXAPARIN SODIUM 40 MG: 40 INJECTION SUBCUTANEOUS at 09:28

## 2018-10-12 RX ADMIN — PANTOPRAZOLE SODIUM 40 MG: 40 INJECTION, POWDER, FOR SOLUTION INTRAVENOUS at 09:28

## 2018-10-12 RX ADMIN — ACETAMINOPHEN 650 MG: 325 TABLET ORAL at 07:09

## 2018-10-12 RX ADMIN — DOCUSATE SODIUM 100 MG: 100 CAPSULE, LIQUID FILLED ORAL at 09:28

## 2018-10-12 RX ADMIN — ACETAMINOPHEN 650 MG: 325 TABLET ORAL at 03:02

## 2018-10-12 RX ADMIN — CEFAZOLIN SODIUM 2 G: 2 INJECTION, SOLUTION INTRAVENOUS at 03:02

## 2018-10-12 RX ADMIN — INFLUENZA A VIRUS A/MICHIGAN/45/2015 X-275 (H1N1) ANTIGEN (FORMALDEHYDE INACTIVATED), INFLUENZA A VIRUS A/SINGAPORE/INFIMH-16-0019/2016 IVR-186 (H3N2) ANTIGEN (FORMALDEHYDE INACTIVATED), INFLUENZA B VIRUS B/PHUKET/3073/2013 ANTIGEN (FORMALDEHYDE INACTIVATED), AND INFLUENZA B VIRUS B/MARYLAND/15/2016 BX-69A ANTIGEN (FORMALDEHYDE INACTIVATED) 0.5 ML: 15; 15; 15; 15 INJECTION, SUSPENSION INTRAMUSCULAR at 11:50

## 2018-10-12 ASSESSMENT — PAIN DESCRIPTION - LOCATION
LOCATION: SHOULDER

## 2018-10-12 ASSESSMENT — PAIN DESCRIPTION - FREQUENCY
FREQUENCY: CONTINUOUS

## 2018-10-12 ASSESSMENT — PAIN DESCRIPTION - PROGRESSION
CLINICAL_PROGRESSION: NOT CHANGED
CLINICAL_PROGRESSION: GRADUALLY WORSENING
CLINICAL_PROGRESSION: GRADUALLY WORSENING

## 2018-10-12 ASSESSMENT — PAIN SCALES - GENERAL
PAINLEVEL_OUTOF10: 0
PAINLEVEL_OUTOF10: 6
PAINLEVEL_OUTOF10: 0
PAINLEVEL_OUTOF10: 6
PAINLEVEL_OUTOF10: 5
PAINLEVEL_OUTOF10: 0
PAINLEVEL_OUTOF10: 0

## 2018-10-12 ASSESSMENT — PAIN DESCRIPTION - PAIN TYPE
TYPE: SURGICAL PAIN;REFERRED PAIN

## 2018-10-12 ASSESSMENT — PAIN DESCRIPTION - ORIENTATION
ORIENTATION: RIGHT

## 2018-10-12 ASSESSMENT — PAIN DESCRIPTION - DESCRIPTORS
DESCRIPTORS: ACHING

## 2018-10-12 ASSESSMENT — PAIN DESCRIPTION - ONSET
ONSET: ON-GOING

## 2018-10-12 NOTE — PROGRESS NOTES
GENERAL SURGERY PROGRESS NOTE                Vitals:    10/11/18 1639 10/11/18 1715 10/12/18 0030 10/12/18 0618   BP: 129/68 124/73 (!) 120/58 (!) 103/54   Pulse: 80 77 65 74   Resp: 16 16 16 18   Temp: 97.8 °F (36.6 °C) 97.9 °F (36.6 °C) 97.8 °F (36.6 °C) 97.6 °F (36.4 °C)   TempSrc: Oral Oral Oral Oral   SpO2: 94% 94% 95%    Weight:       Height:         I/O last 3 completed shifts: In: 1750 [I.V.:1750]  Out: 450 [Urine:400; Blood:50]  No intake/output data recorded.     Diet NPO Effective Now Exceptions are: Ice Chips, Sips with Meds    Recent Results (from the past 48 hour(s))   Basic Metabolic Panel w/ Reflex to MG    Collection Time: 10/12/18  4:57 AM   Result Value Ref Range    Sodium 136 135 - 145 MMOL/L    Potassium 3.6 3.5 - 5.1 MMOL/L    Chloride 100 99 - 110 mMol/L    CO2 25 21 - 32 MMOL/L    Anion Gap 11 4 - 16    BUN 8 6 - 23 MG/DL    CREATININE 0.6 0.6 - 1.1 MG/DL    Glucose 136 (H) 70 - 99 MG/DL    Calcium 8.5 8.3 - 10.6 MG/DL    GFR Non-African American >60 >60 mL/min/1.73m2    GFR African American >60 >60 mL/min/1.73m2   CBC auto differential    Collection Time: 10/12/18  4:57 AM   Result Value Ref Range    WBC 9.8 4.0 - 10.5 K/CU MM    RBC 3.95 (L) 4.2 - 5.4 M/CU MM    Hemoglobin 10.7 (L) 12.5 - 16.0 GM/DL    Hematocrit 33.4 (L) 37 - 47 %    MCV 84.6 78 - 100 FL    MCH 27.1 27 - 31 PG    MCHC 32.0 32.0 - 36.0 %    RDW 14.5 11.7 - 14.9 %    Platelets 840 580 - 867 K/CU MM    MPV 9.2 7.5 - 11.1 FL    Differential Type AUTOMATED DIFFERENTIAL     Segs Relative 77.5 (H) 36 - 66 %    Lymphocytes % 13.4 (L) 24 - 44 %    Monocytes % 8.4 (H) 0 - 4 %    Eosinophils % 0.2 0 - 3 %    Basophils % 0.3 0 - 1 %    Segs Absolute 7.6 K/CU MM    Lymphocytes # 1.3 K/CU MM    Monocytes # 0.8 K/CU MM    Eosinophils # 0.0 K/CU MM    Basophils # 0.0 K/CU MM    Nucleated RBC % 0.0 %    Total Nucleated RBC 0.0 K/CU MM    Total Immature Neutrophil 0.02 K/CU MM    Immature Neutrophil % 0.2 0 - 0.43 %       Scheduled

## 2018-10-12 NOTE — CARE COORDINATION
Reviewed chart and spoke with pt/mother about discharge needs/plans. Pt lives alone but plans on going to her mother's home at discharge. PTA she was independent with ADL's /Transportation and works FT at [address] [place] [address]. Pt has a PCP and insurance that covers medications. She states can afford medications so far and aware of Med Assist is needed. Plan is discharge today. Patients white board updated and  card provided to pt/family.

## 2018-10-15 NOTE — PROGRESS NOTES
CLINICAL PHARMACY NOTE: MEDS TO [address] Select Patient?: Yes  Total # of Prescriptions Filled: 2   The following medications were delivered to the patient:  · Ondansetron 4mg  · Oxycodone/apap 5/325mg  Total # of Interventions Completed: 0  Time Spent (min): 15    Additional Documentation:

## 2018-10-19 ENCOUNTER — OFFICE VISIT (OUTPATIENT)
Dept: BARIATRICS/WEIGHT MGMT | Age: 58
End: 2018-10-19

## 2018-10-19 ENCOUNTER — CARE COORDINATION (OUTPATIENT)
Dept: CASE MANAGEMENT | Age: 58
End: 2018-10-19

## 2018-10-19 VITALS
DIASTOLIC BLOOD PRESSURE: 92 MMHG | SYSTOLIC BLOOD PRESSURE: 136 MMHG | BODY MASS INDEX: 33.88 KG/M2 | RESPIRATION RATE: 18 BRPM | HEART RATE: 84 BPM | WEIGHT: 191.2 LBS | HEIGHT: 63 IN

## 2018-10-19 DIAGNOSIS — Z98.890 STATUS POST LAPAROSCOPIC NISSEN FUNDOPLICATION: Primary | ICD-10-CM

## 2018-10-19 PROCEDURE — 99024 POSTOP FOLLOW-UP VISIT: CPT | Performed by: SURGERY

## 2018-10-19 NOTE — PROGRESS NOTES
Take 1 tablet by mouth daily While on narcotic pain medication please take. 20 tablet 0    ondansetron (ZOFRAN) 4 MG tablet Take 1 tablet by mouth every 8 hours as needed for Nausea or Vomiting 21 tablet 0    hydrochlorothiazide (HYDRODIURIL) 25 MG tablet Take 25 mg by mouth nightly      ferrous sulfate 325 (65 Fe) MG tablet Take 325 mg by mouth nightly Over The Counter      vitamin C (ASCORBIC ACID) 500 MG tablet Take 500 mg by mouth nightly Over The Counter      Multiple Vitamins-Minerals (MULTIVITAMIN PO) Take by mouth nightly Over The Counter       No current facility-administered medications for this visit. Facility-Administered Medications Ordered in Other Visits   Medication Dose Route Frequency Provider Last Rate Last Dose    sodium chloride flush 0.9 % injection 10 mL  10 mL Intravenous PRN Sosa Denson MD   10 mL at 07/18/18 1100      Allergies   Allergen Reactions    Codeine      \"Chest Pain\"    Ultram [Tramadol] Nausea Only           Review of Systems      OBJECTIVE:    BP (!) 136/92 (Site: Right Upper Arm, Position: Sitting, Cuff Size: Large Adult)   Pulse 84   Resp 18   Ht 5' 3\" (1.6 m)   Wt 191 lb 3.2 oz (86.7 kg)   BMI 33.87 kg/m²      Physical Exam      ASSESSMENT & PLAN:    1. Status post laparoscopic Nissen fundoplication         Doing very well, still on full liquid diet. Continue x 1 wk. Then regular diet as tolerated. No weight bearing. Patient counseled on the risks, benefits, and alternatives of treatment plan at length while in the office today. Patient states an understanding and willingness to proceed with the plan. Follow Up:  Return for PRN - As needed for any new symptom, Follow up Symptoms. Miguel Angel Howard MD, FACS, FICS. 10/19/18       Patient was seen with total face to face time of 25 minutes. More than 50% of this visit was counseling and education as above in my assessment and plan.

## 2018-10-19 NOTE — CARE COORDINATION
[facility] Transitions Follow Up Call    10/19/2018    Patient: Akua Caraballo  Patient : 1960   MRN: [id number]  Reason for Admission: There are no discharge diagnoses documented for the most recent discharge. Discharge Date: 10/12/18 RARS: Readmission Risk Score: 8       Spoke with:  patient    Care Transitions Subsequent and Final Call    Subsequent and Final Calls  Care Transitions Interventions  Other Interventions: Follow Up:  Spoke with patient. Patient reports that she has been to her follow up appointment with her surgeon today and is \"doing really well\". Reports that her incisions are healing well without s/s of infection. Reviewed infection prevention measures and s/s to report to MD.    Patient reports that she was instructed to continue with her liquid diet and is tolerating it well. Denies pain or nausea. Patient reports that her symptoms are well managed and that she is \"feeling great\". Encouraged patient to follow diet instructions and reminded her of Dietician available at surgeon's office for dietary support/education. Denies any questions, equipment or resource needs. Denied the need for further transition calls. Confirmed that patient has CTC contact information. Encouraged her to call if needs arise. No future appointments.     Morales Peoples RN

## 2018-10-30 ENCOUNTER — TELEPHONE (OUTPATIENT)
Dept: SURGERY | Age: 58
End: 2018-10-30

## 2018-11-23 ENCOUNTER — OFFICE VISIT (OUTPATIENT)
Dept: FAMILY MEDICINE CLINIC | Age: 58
End: 2018-11-23
Payer: COMMERCIAL

## 2018-11-23 VITALS
TEMPERATURE: 98.3 F | OXYGEN SATURATION: 98 % | HEART RATE: 85 BPM | HEIGHT: 62 IN | WEIGHT: 183 LBS | BODY MASS INDEX: 33.68 KG/M2 | SYSTOLIC BLOOD PRESSURE: 118 MMHG | DIASTOLIC BLOOD PRESSURE: 78 MMHG

## 2018-11-23 DIAGNOSIS — R39.9 UTI SYMPTOMS: Primary | ICD-10-CM

## 2018-11-23 DIAGNOSIS — R31.0 GROSS HEMATURIA: ICD-10-CM

## 2018-11-23 PROBLEM — N39.0 URINARY TRACT INFECTION WITH HEMATURIA: Status: ACTIVE | Noted: 2018-11-23

## 2018-11-23 PROBLEM — R31.9 URINARY TRACT INFECTION WITH HEMATURIA: Status: ACTIVE | Noted: 2018-11-23

## 2018-11-23 LAB
BILIRUBIN, POC: ABNORMAL
BLOOD URINE, POC: ABNORMAL
CLARITY, POC: ABNORMAL
COLOR, POC: ABNORMAL
GLUCOSE URINE, POC: NEGATIVE
KETONES, POC: ABNORMAL
LEUKOCYTE EST, POC: ABNORMAL
NITRITE, POC: NEGATIVE
PH, POC: 6.5
PROTEIN, POC: ABNORMAL
SPECIFIC GRAVITY, POC: >=1.03
UROBILINOGEN, POC: ABNORMAL

## 2018-11-23 PROCEDURE — 81002 URINALYSIS NONAUTO W/O SCOPE: CPT | Performed by: NURSE PRACTITIONER

## 2018-11-23 PROCEDURE — 99213 OFFICE O/P EST LOW 20 MIN: CPT | Performed by: NURSE PRACTITIONER

## 2018-11-23 RX ORDER — SULFAMETHOXAZOLE AND TRIMETHOPRIM 800; 160 MG/1; MG/1
1 TABLET ORAL 2 TIMES DAILY
Qty: 10 TABLET | Refills: 0 | Status: SHIPPED | OUTPATIENT
Start: 2018-11-23 | End: 2018-11-28

## 2018-11-23 ASSESSMENT — ENCOUNTER SYMPTOMS
SHORTNESS OF BREATH: 0
WHEEZING: 0
CHEST TIGHTNESS: 0
VOMITING: 0
NAUSEA: 0
COUGH: 0
ABDOMINAL PAIN: 0
BACK PAIN: 0

## 2018-11-23 ASSESSMENT — PATIENT HEALTH QUESTIONNAIRE - PHQ9
SUM OF ALL RESPONSES TO PHQ QUESTIONS 1-9: 0
SUM OF ALL RESPONSES TO PHQ QUESTIONS 1-9: 0
2. FEELING DOWN, DEPRESSED OR HOPELESS: 0
SUM OF ALL RESPONSES TO PHQ9 QUESTIONS 1 & 2: 0
1. LITTLE INTEREST OR PLEASURE IN DOING THINGS: 0

## 2018-11-23 NOTE — PROGRESS NOTES
06/20/2018             Results for orders placed or performed in visit on 11/23/18   POCT Urinalysis no Micro   Result Value Ref Range    Color, UA Brown     Clarity, UA Cloudy     Glucose, UA POC Negative     Bilirubin, UA Moderate     Ketones, UA Trace     Spec Grav, UA >=1.030     Blood, UA POC Large     pH, UA 6.5     Protein, UA POC >=300 mg/dL     Urobilinogen, UA 1.0 E.U./dL     Leukocytes, UA Large     Nitrite, UA Negative        ASSESSMENT AND PLAN:     1. UTI symptoms  - take prescribed medication as directed  - will call results of culture; will change antibiotic if needed  - increase fluids, rest  - POCT Urinalysis no Micro  - Urine Culture  - sulfamethoxazole-trimethoprim (BACTRIM DS;SEPTRA DS) 800-160 MG per tablet; Take 1 tablet by mouth 2 times daily for 5 days  Dispense: 10 tablet; Refill: 0    2. Gross hematuria  - complaint medication  - increase fluids, rest  - sulfamethoxazole-trimethoprim (BACTRIM DS;SEPTRA DS) 800-160 MG per tablet; Take 1 tablet by mouth 2 times daily for 5 days  Dispense: 10 tablet; Refill: 0      Return if symptoms worsen or fail to improve. Care discussed with patient. Patient educated on signs and symptoms of exacerbation and when to seek further medical attention. Advised to call for any problems, questions, or concerns. Patient verbalizes understanding and agrees with plan. Medications reviewed and reconciled. Continue current medications. Appropriate prescriptions are ordered. Risks and benefits of meds are discussed. After visit summary provided.

## 2018-11-23 NOTE — PATIENT INSTRUCTIONS
Increase fluids   Frequent urination  Complete antibiotic as directed  Will call results of culture  Verbalized understanding and agreement with plan

## 2018-11-24 LAB — URINE CULTURE, ROUTINE: NORMAL

## 2018-12-04 ENCOUNTER — HOSPITAL ENCOUNTER (OUTPATIENT)
Age: 58
Setting detail: SPECIMEN
Discharge: HOME | End: 2018-12-04
Payer: COMMERCIAL

## 2018-12-04 LAB
ALBUMIN SERPL-MCNC: 4.3 GM/DL (ref 3.4–5)
ALP BLD-CCNC: 86 IU/L (ref 40–129)
ALT SERPL-CCNC: 23 U/L (ref 10–40)
ANION GAP SERPL CALCULATED.3IONS-SCNC: 13 MMOL/L (ref 4–16)
AST SERPL-CCNC: 23 IU/L (ref 15–37)
BILIRUB SERPL-MCNC: 0.3 MG/DL (ref 0–1)
BUN BLDV-MCNC: 13 MG/DL (ref 6–23)
CALCIUM SERPL-MCNC: 9.6 MG/DL (ref 8.3–10.6)
CHLORIDE BLD-SCNC: 102 MMOL/L (ref 99–110)
CO2: 27 MMOL/L (ref 21–32)
CREAT SERPL-MCNC: 0.5 MG/DL (ref 0.6–1.1)
FERRITIN: 29 NG/ML (ref 15–150)
GFR AFRICAN AMERICAN: >60 ML/MIN/1.73M2
GFR NON-AFRICAN AMERICAN: >60 ML/MIN/1.73M2
GLUCOSE BLD-MCNC: 81 MG/DL (ref 70–99)
IRON: 48 UG/DL (ref 37–145)
PCT TRANSFERRIN: 15 % (ref 10–44)
POTASSIUM SERPL-SCNC: 4.6 MMOL/L (ref 3.5–5.1)
SODIUM BLD-SCNC: 142 MMOL/L (ref 135–145)
TOTAL IRON BINDING CAPACITY: 321 UG/DL (ref 250–450)
TOTAL PROTEIN: 7.2 GM/DL (ref 6.4–8.2)
UNSATURATED IRON BINDING CAPACITY: 273 UG/DL (ref 110–370)

## 2018-12-04 PROCEDURE — 83550 IRON BINDING TEST: CPT

## 2018-12-04 PROCEDURE — 82728 ASSAY OF FERRITIN: CPT

## 2018-12-04 PROCEDURE — 80053 COMPREHEN METABOLIC PANEL: CPT

## 2018-12-04 PROCEDURE — 83540 ASSAY OF IRON: CPT

## 2018-12-17 ENCOUNTER — HOSPITAL ENCOUNTER (OUTPATIENT)
Dept: WOMENS IMAGING | Age: 58
Discharge: HOME | End: 2018-12-17
Payer: COMMERCIAL

## 2018-12-17 DIAGNOSIS — Z12.31 SCREENING MAMMOGRAM, ENCOUNTER FOR: ICD-10-CM

## 2018-12-17 PROCEDURE — 77063 BREAST TOMOSYNTHESIS BI: CPT

## 2018-12-20 ENCOUNTER — HOSPITAL ENCOUNTER (OUTPATIENT)
Dept: INFUSION THERAPY | Age: 58
Setting detail: INFUSION SERIES
Discharge: HOME | End: 2018-12-20
Payer: COMMERCIAL

## 2018-12-20 ENCOUNTER — HOSPITAL ENCOUNTER (OUTPATIENT)
Dept: INFUSION THERAPY | Age: 58
Setting detail: INFUSION SERIES
End: 2018-12-20

## 2018-12-20 VITALS
HEART RATE: 72 BPM | TEMPERATURE: 98.4 F | RESPIRATION RATE: 14 BRPM | HEIGHT: 61 IN | DIASTOLIC BLOOD PRESSURE: 75 MMHG | SYSTOLIC BLOOD PRESSURE: 138 MMHG | WEIGHT: 183 LBS | OXYGEN SATURATION: 100 % | BODY MASS INDEX: 34.55 KG/M2

## 2018-12-20 PROCEDURE — 2580000003 HC RX 258: Performed by: INTERNAL MEDICINE

## 2018-12-20 PROCEDURE — 6370000000 HC RX 637 (ALT 250 FOR IP): Performed by: INTERNAL MEDICINE

## 2018-12-20 PROCEDURE — 96365 THER/PROPH/DIAG IV INF INIT: CPT

## 2018-12-20 PROCEDURE — 2580000003 HC RX 258

## 2018-12-20 PROCEDURE — 99211 OFF/OP EST MAY X REQ PHY/QHP: CPT

## 2018-12-20 PROCEDURE — 6360000002 HC RX W HCPCS: Performed by: INTERNAL MEDICINE

## 2018-12-20 PROCEDURE — 96366 THER/PROPH/DIAG IV INF ADDON: CPT

## 2018-12-20 RX ORDER — DEXAMETHASONE SODIUM PHOSPHATE 4 MG/ML
10 INJECTION, SOLUTION INTRA-ARTICULAR; INTRALESIONAL; INTRAMUSCULAR; INTRAVENOUS; SOFT TISSUE ONCE
Status: COMPLETED | OUTPATIENT
Start: 2018-12-20 | End: 2018-12-20

## 2018-12-20 RX ORDER — ACETAMINOPHEN 325 MG/1
650 TABLET ORAL ONCE
Status: COMPLETED | OUTPATIENT
Start: 2018-12-20 | End: 2018-12-20

## 2018-12-20 RX ORDER — 0.9 % SODIUM CHLORIDE 0.9 %
10 VIAL (ML) INJECTION PRN
Status: DISCONTINUED | OUTPATIENT
Start: 2018-12-20 | End: 2018-12-21 | Stop reason: HOSPADM

## 2018-12-20 RX ADMIN — DEXAMETHASONE SODIUM PHOSPHATE 10 MG: 4 INJECTION, SOLUTION INTRAMUSCULAR; INTRAVENOUS at 11:36

## 2018-12-20 RX ADMIN — IRON SUCROSE 300 MG: 20 INJECTION, SOLUTION INTRAVENOUS at 11:40

## 2018-12-20 RX ADMIN — ACETAMINOPHEN 650 MG: 325 TABLET ORAL at 11:35

## 2018-12-20 ASSESSMENT — PAIN SCALES - GENERAL: PAINLEVEL_OUTOF10: 0

## 2018-12-20 NOTE — PROGRESS NOTES
Reviewed discharge instructions, voiced understanding, and copies of AVS given to take home. Pt tolerated infusion well, with no s/s of an allergic reaction. Pt to private auto with steady gait.

## 2018-12-27 ENCOUNTER — HOSPITAL ENCOUNTER (OUTPATIENT)
Dept: INFUSION THERAPY | Age: 58
Setting detail: INFUSION SERIES
Discharge: HOME | End: 2018-12-27
Payer: COMMERCIAL

## 2018-12-27 VITALS
HEIGHT: 61 IN | SYSTOLIC BLOOD PRESSURE: 130 MMHG | TEMPERATURE: 98.4 F | BODY MASS INDEX: 34.55 KG/M2 | WEIGHT: 183 LBS | DIASTOLIC BLOOD PRESSURE: 89 MMHG | RESPIRATION RATE: 16 BRPM | HEART RATE: 80 BPM | OXYGEN SATURATION: 100 %

## 2018-12-27 PROCEDURE — 99211 OFF/OP EST MAY X REQ PHY/QHP: CPT

## 2018-12-27 PROCEDURE — 96365 THER/PROPH/DIAG IV INF INIT: CPT

## 2018-12-27 PROCEDURE — 6360000002 HC RX W HCPCS: Performed by: INTERNAL MEDICINE

## 2018-12-27 PROCEDURE — 2580000003 HC RX 258

## 2018-12-27 PROCEDURE — 6370000000 HC RX 637 (ALT 250 FOR IP): Performed by: INTERNAL MEDICINE

## 2018-12-27 PROCEDURE — 96366 THER/PROPH/DIAG IV INF ADDON: CPT

## 2018-12-27 PROCEDURE — 2580000003 HC RX 258: Performed by: INTERNAL MEDICINE

## 2018-12-27 RX ORDER — ACETAMINOPHEN 325 MG/1
650 TABLET ORAL ONCE
Status: COMPLETED | OUTPATIENT
Start: 2018-12-27 | End: 2018-12-27

## 2018-12-27 RX ORDER — DEXAMETHASONE SODIUM PHOSPHATE 4 MG/ML
10 INJECTION, SOLUTION INTRA-ARTICULAR; INTRALESIONAL; INTRAMUSCULAR; INTRAVENOUS; SOFT TISSUE ONCE
Status: COMPLETED | OUTPATIENT
Start: 2018-12-27 | End: 2018-12-27

## 2018-12-27 RX ADMIN — ACETAMINOPHEN 650 MG: 325 TABLET ORAL at 09:22

## 2018-12-27 RX ADMIN — DEXAMETHASONE SODIUM PHOSPHATE 10 MG: 4 INJECTION, SOLUTION INTRAMUSCULAR; INTRAVENOUS at 09:22

## 2018-12-27 RX ADMIN — IRON SUCROSE 300 MG: 20 INJECTION, SOLUTION INTRAVENOUS at 09:27

## 2018-12-27 ASSESSMENT — PAIN SCALES - GENERAL
PAINLEVEL_OUTOF10: 0
PAINLEVEL_OUTOF10: 0

## 2019-01-02 ENCOUNTER — HOSPITAL ENCOUNTER (OUTPATIENT)
Dept: INFUSION THERAPY | Age: 59
Setting detail: INFUSION SERIES
Discharge: HOME | End: 2019-01-02
Payer: COMMERCIAL

## 2019-01-02 VITALS
SYSTOLIC BLOOD PRESSURE: 128 MMHG | HEART RATE: 82 BPM | RESPIRATION RATE: 14 BRPM | DIASTOLIC BLOOD PRESSURE: 75 MMHG | OXYGEN SATURATION: 100 % | TEMPERATURE: 98.1 F

## 2019-01-02 PROCEDURE — 96374 THER/PROPH/DIAG INJ IV PUSH: CPT

## 2019-01-02 PROCEDURE — 96366 THER/PROPH/DIAG IV INF ADDON: CPT

## 2019-01-02 PROCEDURE — 2580000003 HC RX 258

## 2019-01-02 PROCEDURE — 2580000003 HC RX 258: Performed by: INTERNAL MEDICINE

## 2019-01-02 PROCEDURE — 6370000000 HC RX 637 (ALT 250 FOR IP): Performed by: INTERNAL MEDICINE

## 2019-01-02 PROCEDURE — 96365 THER/PROPH/DIAG IV INF INIT: CPT

## 2019-01-02 PROCEDURE — 99211 OFF/OP EST MAY X REQ PHY/QHP: CPT

## 2019-01-02 PROCEDURE — 6360000002 HC RX W HCPCS: Performed by: INTERNAL MEDICINE

## 2019-01-02 RX ORDER — DEXAMETHASONE SODIUM PHOSPHATE 4 MG/ML
10 INJECTION, SOLUTION INTRA-ARTICULAR; INTRALESIONAL; INTRAMUSCULAR; INTRAVENOUS; SOFT TISSUE ONCE
Status: DISCONTINUED | OUTPATIENT
Start: 2019-01-02 | End: 2019-01-02

## 2019-01-02 RX ORDER — ACETAMINOPHEN 325 MG/1
650 TABLET ORAL ONCE
Status: COMPLETED | OUTPATIENT
Start: 2019-01-02 | End: 2019-01-02

## 2019-01-02 RX ADMIN — ACETAMINOPHEN 650 MG: 325 TABLET ORAL at 08:31

## 2019-01-02 RX ADMIN — DEXAMETHASONE SODIUM PHOSPHATE 10 MG: 4 INJECTION, SOLUTION INTRA-ARTICULAR; INTRALESIONAL; INTRAMUSCULAR; INTRAVENOUS; SOFT TISSUE at 08:31

## 2019-01-02 RX ADMIN — IRON SUCROSE 300 MG: 20 INJECTION, SOLUTION INTRAVENOUS at 08:47

## 2019-01-02 ASSESSMENT — PAIN SCALES - GENERAL: PAINLEVEL_OUTOF10: 0

## 2019-01-02 NOTE — DISCHARGE SUMMARY
PT GIVEN DISCHARGE INSTRUCTIONS.  VOICE UNDERSTANDING OF CARE. TO RETURN TO SEE NEXT WEEK FOR ANOTHER IRON INFUSION.

## 2019-01-09 ENCOUNTER — HOSPITAL ENCOUNTER (OUTPATIENT)
Dept: INFUSION THERAPY | Age: 59
Setting detail: INFUSION SERIES
Discharge: HOME | End: 2019-01-09
Payer: COMMERCIAL

## 2019-01-09 VITALS
WEIGHT: 183 LBS | OXYGEN SATURATION: 97 % | RESPIRATION RATE: 16 BRPM | TEMPERATURE: 97.8 F | HEIGHT: 61 IN | BODY MASS INDEX: 34.55 KG/M2 | DIASTOLIC BLOOD PRESSURE: 81 MMHG | SYSTOLIC BLOOD PRESSURE: 135 MMHG | HEART RATE: 72 BPM

## 2019-01-09 PROCEDURE — 96374 THER/PROPH/DIAG INJ IV PUSH: CPT

## 2019-01-09 PROCEDURE — 6370000000 HC RX 637 (ALT 250 FOR IP): Performed by: INTERNAL MEDICINE

## 2019-01-09 PROCEDURE — 2580000003 HC RX 258

## 2019-01-09 PROCEDURE — 6360000002 HC RX W HCPCS: Performed by: INTERNAL MEDICINE

## 2019-01-09 PROCEDURE — 96365 THER/PROPH/DIAG IV INF INIT: CPT

## 2019-01-09 PROCEDURE — 2580000003 HC RX 258: Performed by: INTERNAL MEDICINE

## 2019-01-09 PROCEDURE — 99211 OFF/OP EST MAY X REQ PHY/QHP: CPT

## 2019-01-09 RX ORDER — ACETAMINOPHEN 325 MG/1
650 TABLET ORAL ONCE
Status: COMPLETED | OUTPATIENT
Start: 2019-01-09 | End: 2019-01-09

## 2019-01-09 RX ORDER — DEXAMETHASONE SODIUM PHOSPHATE 4 MG/ML
10 INJECTION, SOLUTION INTRA-ARTICULAR; INTRALESIONAL; INTRAMUSCULAR; INTRAVENOUS; SOFT TISSUE ONCE
Status: COMPLETED | OUTPATIENT
Start: 2019-01-09 | End: 2019-01-09

## 2019-01-09 RX ORDER — 0.9 % SODIUM CHLORIDE 0.9 %
10 VIAL (ML) INJECTION PRN
Status: DISCONTINUED | OUTPATIENT
Start: 2019-01-09 | End: 2019-01-10 | Stop reason: HOSPADM

## 2019-01-09 RX ADMIN — IRON SUCROSE 300 MG: 20 INJECTION, SOLUTION INTRAVENOUS at 08:35

## 2019-01-09 RX ADMIN — SODIUM CHLORIDE, PRESERVATIVE FREE 10 ML: 5 INJECTION INTRAVENOUS at 08:30

## 2019-01-09 RX ADMIN — ACETAMINOPHEN 650 MG: 325 TABLET ORAL at 08:35

## 2019-01-09 RX ADMIN — SODIUM CHLORIDE, PRESERVATIVE FREE 10 ML: 5 INJECTION INTRAVENOUS at 08:35

## 2019-01-09 RX ADMIN — DEXAMETHASONE SODIUM PHOSPHATE 10 MG: 4 INJECTION, SOLUTION INTRAMUSCULAR; INTRAVENOUS at 08:35

## 2019-01-09 RX ADMIN — SODIUM CHLORIDE, PRESERVATIVE FREE 10 ML: 5 INJECTION INTRAVENOUS at 10:35

## 2019-01-09 ASSESSMENT — PAIN SCALES - GENERAL: PAINLEVEL_OUTOF10: 0

## 2019-01-09 NOTE — DISCHARGE SUMMARY
Tolerated Venofer well. Discharge instructions explained written copy given. Understanding verbalized. Discharged home. Down to private auto per self  .

## 2019-01-09 NOTE — PLAN OF CARE
Ambulatory to unit room 2 for Venofer. Reorientated to unit. Procedure and plan of care explained. Questions answered. Understanding verbalized.

## 2019-02-04 ENCOUNTER — HOSPITAL ENCOUNTER (OUTPATIENT)
Age: 59
Discharge: HOME | End: 2019-02-04
Payer: COMMERCIAL

## 2019-02-04 ENCOUNTER — HOSPITAL ENCOUNTER (OUTPATIENT)
Dept: GENERAL RADIOLOGY | Age: 59
Discharge: HOME | End: 2019-02-04
Payer: COMMERCIAL

## 2019-02-04 DIAGNOSIS — Z09 FOLLOW UP: ICD-10-CM

## 2019-02-04 PROCEDURE — 74018 RADEX ABDOMEN 1 VIEW: CPT

## 2019-02-12 ENCOUNTER — HOSPITAL ENCOUNTER (OUTPATIENT)
Age: 59
Setting detail: SPECIMEN
Discharge: HOME | End: 2019-02-12
Payer: COMMERCIAL

## 2019-02-12 ENCOUNTER — TELEPHONE (OUTPATIENT)
Dept: BARIATRICS/WEIGHT MGMT | Age: 59
End: 2019-02-12

## 2019-02-12 DIAGNOSIS — M54.6 ACUTE MIDLINE THORACIC BACK PAIN: ICD-10-CM

## 2019-02-12 LAB
BASOPHILS ABSOLUTE: 0.1 K/CU MM
BASOPHILS RELATIVE PERCENT: 1.1 % (ref 0–1)
DIFFERENTIAL TYPE: ABNORMAL
EOSINOPHILS ABSOLUTE: 0.1 K/CU MM
EOSINOPHILS RELATIVE PERCENT: 1.6 % (ref 0–3)
FERRITIN: 415 NG/ML (ref 15–150)
FOLATE: >20 NG/ML (ref 3.1–17.5)
HCT VFR BLD CALC: 39.3 % (ref 37–47)
HEMOGLOBIN: 12.5 GM/DL (ref 12.5–16)
IMMATURE NEUTROPHIL %: 0.2 % (ref 0–0.43)
IRON: 68 UG/DL (ref 37–145)
LYMPHOCYTES ABSOLUTE: 1.7 K/CU MM
LYMPHOCYTES RELATIVE PERCENT: 27.8 % (ref 24–44)
MCH RBC QN AUTO: 28.1 PG (ref 27–31)
MCHC RBC AUTO-ENTMCNC: 31.8 % (ref 32–36)
MCV RBC AUTO: 88.3 FL (ref 78–100)
MONOCYTES ABSOLUTE: 0.5 K/CU MM
MONOCYTES RELATIVE PERCENT: 8.2 % (ref 0–4)
NUCLEATED RBC %: 0 %
PCT TRANSFERRIN: 26 % (ref 10–44)
PDW BLD-RTO: 15.9 % (ref 11.7–14.9)
PLATELET # BLD: 276 K/CU MM (ref 140–440)
PMV BLD AUTO: 9.8 FL (ref 7.5–11.1)
RBC # BLD: 4.45 M/CU MM (ref 4.2–5.4)
SEGMENTED NEUTROPHILS ABSOLUTE COUNT: 3.7 K/CU MM
SEGMENTED NEUTROPHILS RELATIVE PERCENT: 61.1 % (ref 36–66)
TOTAL IMMATURE NEUTOROPHIL: 0.01 K/CU MM
TOTAL IRON BINDING CAPACITY: 259 UG/DL (ref 250–450)
TOTAL NUCLEATED RBC: 0 K/CU MM
UNSATURATED IRON BINDING CAPACITY: 191 UG/DL (ref 110–370)
VITAMIN B-12: 695.7 PG/ML (ref 211–911)
WBC # BLD: 6.1 K/CU MM (ref 4–10.5)

## 2019-02-12 PROCEDURE — 82607 VITAMIN B-12: CPT

## 2019-02-12 PROCEDURE — 83540 ASSAY OF IRON: CPT

## 2019-02-12 PROCEDURE — 85025 COMPLETE CBC W/AUTO DIFF WBC: CPT

## 2019-02-12 PROCEDURE — 82728 ASSAY OF FERRITIN: CPT

## 2019-02-12 PROCEDURE — 82746 ASSAY OF FOLIC ACID SERUM: CPT

## 2019-02-12 PROCEDURE — 83550 IRON BINDING TEST: CPT

## 2019-02-20 ENCOUNTER — OFFICE VISIT (OUTPATIENT)
Dept: BARIATRICS/WEIGHT MGMT | Age: 59
End: 2019-02-20
Payer: COMMERCIAL

## 2019-02-20 VITALS
RESPIRATION RATE: 16 BRPM | SYSTOLIC BLOOD PRESSURE: 130 MMHG | DIASTOLIC BLOOD PRESSURE: 82 MMHG | HEART RATE: 72 BPM | WEIGHT: 180 LBS | BODY MASS INDEX: 34.01 KG/M2

## 2019-02-20 DIAGNOSIS — Z87.19 HISTORY OF REPAIR OF HIATAL HERNIA: Primary | ICD-10-CM

## 2019-02-20 DIAGNOSIS — D52.0 DIETARY FOLATE DEFICIENCY ANEMIA: ICD-10-CM

## 2019-02-20 DIAGNOSIS — D50.9 IRON DEFICIENCY ANEMIA, UNSPECIFIED IRON DEFICIENCY ANEMIA TYPE: ICD-10-CM

## 2019-02-20 DIAGNOSIS — Z98.890 HISTORY OF REPAIR OF HIATAL HERNIA: Primary | ICD-10-CM

## 2019-02-20 PROCEDURE — 99214 OFFICE O/P EST MOD 30 MIN: CPT | Performed by: SURGERY

## 2019-02-20 ASSESSMENT — ENCOUNTER SYMPTOMS
PHOTOPHOBIA: 0
COUGH: 0
COLOR CHANGE: 0
VOMITING: 0
ABDOMINAL PAIN: 0
VOICE CHANGE: 0
DIARRHEA: 0
NAUSEA: 0
WHEEZING: 0
BLOOD IN STOOL: 0
TROUBLE SWALLOWING: 0
SHORTNESS OF BREATH: 0
ANAL BLEEDING: 0
SORE THROAT: 0
CONSTIPATION: 0

## 2019-02-22 ENCOUNTER — TELEPHONE (OUTPATIENT)
Dept: BARIATRICS/WEIGHT MGMT | Age: 59
End: 2019-02-22

## 2019-02-22 PROBLEM — M54.6 ACUTE MIDLINE THORACIC BACK PAIN: Status: ACTIVE | Noted: 2019-02-22

## 2019-03-04 ENCOUNTER — HOSPITAL ENCOUNTER (OUTPATIENT)
Dept: CT IMAGING | Age: 59
Discharge: HOME | End: 2019-03-04
Payer: COMMERCIAL

## 2019-03-04 PROCEDURE — 74176 CT ABD & PELVIS W/O CONTRAST: CPT

## 2019-03-04 PROCEDURE — 6360000004 HC RX CONTRAST MEDICATION: Performed by: SURGERY

## 2019-03-04 RX ADMIN — IOHEXOL 50 ML: 240 INJECTION, SOLUTION INTRATHECAL; INTRAVASCULAR; INTRAVENOUS; ORAL at 10:15

## 2019-03-06 ENCOUNTER — OFFICE VISIT (OUTPATIENT)
Dept: BARIATRICS/WEIGHT MGMT | Age: 59
End: 2019-03-06
Payer: COMMERCIAL

## 2019-03-06 VITALS
BODY MASS INDEX: 31.04 KG/M2 | DIASTOLIC BLOOD PRESSURE: 70 MMHG | HEIGHT: 64 IN | SYSTOLIC BLOOD PRESSURE: 118 MMHG | RESPIRATION RATE: 16 BRPM | WEIGHT: 181.8 LBS | HEART RATE: 77 BPM

## 2019-03-06 DIAGNOSIS — D50.9 IRON DEFICIENCY ANEMIA, UNSPECIFIED IRON DEFICIENCY ANEMIA TYPE: Primary | ICD-10-CM

## 2019-03-06 PROCEDURE — 99214 OFFICE O/P EST MOD 30 MIN: CPT | Performed by: SURGERY

## 2019-03-06 ASSESSMENT — ENCOUNTER SYMPTOMS
CONSTIPATION: 0
ABDOMINAL PAIN: 0
VOMITING: 0
COLOR CHANGE: 0
SHORTNESS OF BREATH: 0
BLOOD IN STOOL: 0
VOICE CHANGE: 0
SORE THROAT: 0
COUGH: 0
DIARRHEA: 0
TROUBLE SWALLOWING: 0
ANAL BLEEDING: 0
NAUSEA: 0
PHOTOPHOBIA: 0
WHEEZING: 0

## 2019-04-08 ENCOUNTER — HOSPITAL ENCOUNTER (OUTPATIENT)
Age: 59
Setting detail: SPECIMEN
Discharge: HOME | End: 2019-04-08
Payer: COMMERCIAL

## 2019-04-08 LAB
ALBUMIN SERPL-MCNC: 4 GM/DL (ref 3.4–5)
ALP BLD-CCNC: 92 IU/L (ref 40–129)
ALT SERPL-CCNC: 32 U/L (ref 10–40)
ANION GAP SERPL CALCULATED.3IONS-SCNC: 11 MMOL/L (ref 4–16)
AST SERPL-CCNC: 23 IU/L (ref 15–37)
BILIRUB SERPL-MCNC: 0.3 MG/DL (ref 0–1)
BUN BLDV-MCNC: 14 MG/DL (ref 6–23)
CALCIUM SERPL-MCNC: 8.8 MG/DL (ref 8.3–10.6)
CHLORIDE BLD-SCNC: 102 MMOL/L (ref 99–110)
CO2: 28 MMOL/L (ref 21–32)
CREAT SERPL-MCNC: 0.8 MG/DL (ref 0.6–1.1)
FERRITIN: 303 NG/ML (ref 15–150)
GFR AFRICAN AMERICAN: >60 ML/MIN/1.73M2
GFR NON-AFRICAN AMERICAN: >60 ML/MIN/1.73M2
GLUCOSE BLD-MCNC: 89 MG/DL (ref 70–99)
IRON: 71 UG/DL (ref 37–145)
PCT TRANSFERRIN: 28 % (ref 10–44)
POTASSIUM SERPL-SCNC: 4.4 MMOL/L (ref 3.5–5.1)
SODIUM BLD-SCNC: 141 MMOL/L (ref 135–145)
TOTAL IRON BINDING CAPACITY: 254 UG/DL (ref 250–450)
TOTAL PROTEIN: 6.7 GM/DL (ref 6.4–8.2)
UNSATURATED IRON BINDING CAPACITY: 183 UG/DL (ref 110–370)

## 2019-04-08 PROCEDURE — 82728 ASSAY OF FERRITIN: CPT

## 2019-04-08 PROCEDURE — 83550 IRON BINDING TEST: CPT

## 2019-04-08 PROCEDURE — 83540 ASSAY OF IRON: CPT

## 2019-04-08 PROCEDURE — 80053 COMPREHEN METABOLIC PANEL: CPT

## 2019-05-23 ENCOUNTER — EMPLOYEE WELLNESS (OUTPATIENT)
Dept: OTHER | Age: 59
End: 2019-05-23

## 2019-05-23 LAB
CHOLESTEROL: 182 MG/DL
GLUCOSE BLD-MCNC: 87 MG/DL (ref 70–99)
HDLC SERPL-MCNC: 65 MG/DL
LDL CHOLESTEROL CALCULATED: 106 MG/DL
PATIENT FASTING?: NO
TRIGL SERPL-MCNC: 54 MG/DL

## 2019-05-28 VITALS — BODY MASS INDEX: 32.27 KG/M2 | WEIGHT: 188 LBS

## 2019-08-08 ENCOUNTER — HOSPITAL ENCOUNTER (OUTPATIENT)
Dept: GENERAL RADIOLOGY | Age: 59
Discharge: HOME | End: 2019-08-08
Payer: COMMERCIAL

## 2019-08-08 ENCOUNTER — HOSPITAL ENCOUNTER (OUTPATIENT)
Age: 59
Discharge: HOME | End: 2019-08-08
Payer: COMMERCIAL

## 2019-08-08 DIAGNOSIS — M70.71 BURSITIS OF RIGHT HIP, UNSPECIFIED BURSA: ICD-10-CM

## 2019-08-08 PROCEDURE — 73501 X-RAY EXAM HIP UNI 1 VIEW: CPT

## 2019-09-13 ENCOUNTER — APPOINTMENT (OUTPATIENT)
Dept: GENERAL RADIOLOGY | Age: 59
End: 2019-09-13
Payer: COMMERCIAL

## 2019-09-13 ENCOUNTER — HOSPITAL ENCOUNTER (EMERGENCY)
Age: 59
Discharge: HOME | End: 2019-09-13
Payer: COMMERCIAL

## 2019-09-13 VITALS
BODY MASS INDEX: 34.15 KG/M2 | SYSTOLIC BLOOD PRESSURE: 146 MMHG | DIASTOLIC BLOOD PRESSURE: 84 MMHG | WEIGHT: 200 LBS | HEIGHT: 64 IN | OXYGEN SATURATION: 99 % | TEMPERATURE: 98.2 F | RESPIRATION RATE: 18 BRPM | HEART RATE: 90 BPM

## 2019-09-13 DIAGNOSIS — S92.514A NONDISPLACED FRACTURE OF PROXIMAL PHALANX OF RIGHT LESSER TOE(S), INITIAL ENCOUNTER FOR CLOSED FRACTURE: Primary | ICD-10-CM

## 2019-09-13 DIAGNOSIS — S92.354A NONDISPLACED FRACTURE OF FIFTH METATARSAL BONE, RIGHT FOOT, INITIAL ENCOUNTER FOR CLOSED FRACTURE: ICD-10-CM

## 2019-09-13 PROCEDURE — 99283 EMERGENCY DEPT VISIT LOW MDM: CPT

## 2019-09-13 PROCEDURE — 4500000028 HC INTERMEDIATE PROCEDURE

## 2019-09-13 PROCEDURE — 6370000000 HC RX 637 (ALT 250 FOR IP): Performed by: PHYSICIAN ASSISTANT

## 2019-09-13 PROCEDURE — 73610 X-RAY EXAM OF ANKLE: CPT

## 2019-09-13 PROCEDURE — 73630 X-RAY EXAM OF FOOT: CPT

## 2019-09-13 RX ORDER — HYDROCODONE BITARTRATE AND ACETAMINOPHEN 5; 325 MG/1; MG/1
1 TABLET ORAL EVERY 6 HOURS PRN
Qty: 11 TABLET | Refills: 0 | Status: SHIPPED | OUTPATIENT
Start: 2019-09-13 | End: 2019-09-16

## 2019-09-13 RX ORDER — ACETAMINOPHEN 500 MG
1000 TABLET ORAL ONCE
Status: COMPLETED | OUTPATIENT
Start: 2019-09-13 | End: 2019-09-13

## 2019-09-13 RX ORDER — IBUPROFEN 600 MG/1
600 TABLET ORAL EVERY 6 HOURS PRN
Qty: 30 TABLET | Refills: 0 | Status: SHIPPED | OUTPATIENT
Start: 2019-09-13

## 2019-09-13 RX ORDER — ACETAMINOPHEN 500 MG
500 TABLET ORAL EVERY 6 HOURS PRN
Qty: 20 TABLET | Refills: 0 | Status: SHIPPED | OUTPATIENT
Start: 2019-09-13

## 2019-09-13 RX ORDER — IBUPROFEN 600 MG/1
600 TABLET ORAL ONCE
Status: COMPLETED | OUTPATIENT
Start: 2019-09-13 | End: 2019-09-13

## 2019-09-13 RX ADMIN — IBUPROFEN 600 MG: 600 TABLET, FILM COATED ORAL at 23:19

## 2019-09-13 RX ADMIN — ACETAMINOPHEN 1000 MG: 500 TABLET ORAL at 23:19

## 2019-09-13 ASSESSMENT — PAIN DESCRIPTION - LOCATION: LOCATION: FOOT

## 2019-09-13 ASSESSMENT — PAIN SCALES - GENERAL
PAINLEVEL_OUTOF10: 6
PAINLEVEL_OUTOF10: 6

## 2019-09-13 ASSESSMENT — PAIN DESCRIPTION - PAIN TYPE: TYPE: ACUTE PAIN

## 2019-09-14 NOTE — ED PROVIDER NOTES
CURETTAGE OF UTERUS  \"Between 2001 And 2007\"    ENDOSCOPY, COLON, DIAGNOSTIC  Last Done In 2016   [address]  10/11/2018    TX LAP ESOPHAGEAL LENGTHENING N/A 10/11/2018    NISSEN FUNDOPLICATION LAPAROSCOPIC ROBOTIC performed by Sarah Child MD at [address] [facility] EXTRACTION      4 [place] Teeth Extracted In Past       CURRENT MEDICATIONS    Current Outpatient Rx   Medication Sig Dispense Refill    ibuprofen (ADVIL;MOTRIN) 600 MG tablet Take 1 tablet by mouth every 6 hours as needed for Pain 30 tablet 0    acetaminophen (APAP EXTRA STRENGTH) 500 MG tablet Take 1 tablet by mouth every 6 hours as needed for Pain 20 tablet 0    HYDROcodone-acetaminophen (NORCO) 5-325 MG per tablet Take 1 tablet by mouth every 6 hours as needed for Pain for up to 3 days. 11 tablet 0    Ferrous Sulfate (IRON SUPPLEMENT PO) Take by mouth daily      hydrochlorothiazide (HYDRODIURIL) 25 MG tablet Take 25 mg by mouth nightly      Multiple Vitamins-Minerals (MULTIVITAMIN PO) Take by mouth nightly Over The Counter         ALLERGIES    Allergies   Allergen Reactions    Codeine      \"Chest Pain\"    Ultram [Tramadol] Nausea Only       SOCIAL & FAMILY HISTORY    Social History     Socioeconomic History    Marital status:       Spouse name: None    Number of children: None    Years of education: None    Highest education level: None   Occupational History    None   Social Needs    Financial resource strain: None    Food insecurity:     Worry: None     Inability: None    Transportation needs:     Medical: None     Non-medical: None   Tobacco Use    Smoking status: Never Smoker    Smokeless tobacco: Never Used   Substance and Sexual Activity    Alcohol use: No    Drug use: No    Sexual activity: None   Lifestyle    Physical activity:     Days per week: None     Minutes per session: None    Stress: None   Relationships    Social connections:     Talks on phone:

## 2019-09-16 ENCOUNTER — TELEPHONE (OUTPATIENT)
Dept: ORTHOPEDIC SURGERY | Age: 59
End: 2019-09-16

## 2019-09-20 ENCOUNTER — OFFICE VISIT (OUTPATIENT)
Dept: ORTHOPEDIC SURGERY | Age: 59
End: 2019-09-20
Payer: COMMERCIAL

## 2019-09-20 VITALS
RESPIRATION RATE: 14 BRPM | WEIGHT: 190 LBS | BODY MASS INDEX: 32.44 KG/M2 | HEIGHT: 64 IN | OXYGEN SATURATION: 97 % | HEART RATE: 98 BPM

## 2019-09-20 DIAGNOSIS — S92.354A NONDISPLACED FRACTURE OF FIFTH METATARSAL BONE, RIGHT FOOT, INITIAL ENCOUNTER FOR CLOSED FRACTURE: Primary | ICD-10-CM

## 2019-09-20 PROCEDURE — 28470 CLTX METATARSAL FX WO MNP EA: CPT | Performed by: PHYSICIAN ASSISTANT

## 2019-09-20 PROCEDURE — 99202 OFFICE O/P NEW SF 15 MIN: CPT | Performed by: PHYSICIAN ASSISTANT

## 2019-09-20 RX ORDER — FUROSEMIDE 40 MG/1
40 TABLET ORAL DAILY PRN
COMMUNITY

## 2019-09-20 ASSESSMENT — ENCOUNTER SYMPTOMS
EYES NEGATIVE: 1
GASTROINTESTINAL NEGATIVE: 1
RESPIRATORY NEGATIVE: 1

## 2019-09-20 NOTE — PATIENT INSTRUCTIONS
Patient fitted with short cam boot today  May bear weight on heel only while wearing boot  Off work until follow-up visit unless there is seated or sedentary work duty available  Follow-up in 2 weeks in the [facility] office in the morning for repeat x-ray, if x-ray shows fracture is healing we may begin to progress to weightbearing in the boot. Call [facility] office and request to speak with Lizzy Mcgrath with questions regarding short term disability paperwork.  Phone [phone]

## 2019-10-02 ENCOUNTER — OFFICE VISIT (OUTPATIENT)
Dept: ORTHOPEDIC SURGERY | Age: 59
End: 2019-10-02

## 2019-10-02 VITALS — HEIGHT: 64 IN | BODY MASS INDEX: 32.78 KG/M2 | WEIGHT: 192 LBS | RESPIRATION RATE: 16 BRPM

## 2019-10-02 DIAGNOSIS — S92.355D CLOSED NONDISPLACED FRACTURE OF FIFTH METATARSAL BONE OF LEFT FOOT WITH ROUTINE HEALING, SUBSEQUENT ENCOUNTER: Primary | ICD-10-CM

## 2019-10-02 PROCEDURE — 99024 POSTOP FOLLOW-UP VISIT: CPT | Performed by: PHYSICIAN ASSISTANT

## 2019-10-30 ENCOUNTER — OFFICE VISIT (OUTPATIENT)
Dept: ORTHOPEDIC SURGERY | Age: 59
End: 2019-10-30

## 2019-10-30 VITALS — HEIGHT: 64 IN | RESPIRATION RATE: 16 BRPM | WEIGHT: 195 LBS | BODY MASS INDEX: 33.29 KG/M2

## 2019-10-30 DIAGNOSIS — S92.355D CLOSED NONDISPLACED FRACTURE OF FIFTH METATARSAL BONE OF LEFT FOOT WITH ROUTINE HEALING, SUBSEQUENT ENCOUNTER: Primary | ICD-10-CM

## 2019-10-30 PROCEDURE — 99024 POSTOP FOLLOW-UP VISIT: CPT | Performed by: PHYSICIAN ASSISTANT

## 2019-11-11 ENCOUNTER — HOSPITAL ENCOUNTER (OUTPATIENT)
Age: 59
Setting detail: SPECIMEN
Discharge: HOME | End: 2019-11-11
Payer: COMMERCIAL

## 2019-11-11 LAB
ALBUMIN SERPL-MCNC: 4.5 GM/DL (ref 3.4–5)
ALP BLD-CCNC: 91 IU/L (ref 40–129)
ALT SERPL-CCNC: 33 U/L (ref 10–40)
ANION GAP SERPL CALCULATED.3IONS-SCNC: 10 MMOL/L (ref 4–16)
AST SERPL-CCNC: 23 IU/L (ref 15–37)
BILIRUB SERPL-MCNC: 0.3 MG/DL (ref 0–1)
BUN BLDV-MCNC: 17 MG/DL (ref 6–23)
CALCIUM SERPL-MCNC: 9.9 MG/DL (ref 8.3–10.6)
CHLORIDE BLD-SCNC: 101 MMOL/L (ref 99–110)
CO2: 29 MMOL/L (ref 21–32)
CREAT SERPL-MCNC: 0.9 MG/DL (ref 0.6–1.1)
FERRITIN: 326 NG/ML (ref 15–150)
GFR AFRICAN AMERICAN: >60 ML/MIN/1.73M2
GFR NON-AFRICAN AMERICAN: >60 ML/MIN/1.73M2
GLUCOSE BLD-MCNC: 75 MG/DL (ref 70–99)
IRON: 79 UG/DL (ref 37–145)
PCT TRANSFERRIN: 30 % (ref 10–44)
POTASSIUM SERPL-SCNC: 4.2 MMOL/L (ref 3.5–5.1)
SODIUM BLD-SCNC: 140 MMOL/L (ref 135–145)
TOTAL IRON BINDING CAPACITY: 266 UG/DL (ref 250–450)
TOTAL PROTEIN: 7.2 GM/DL (ref 6.4–8.2)
UNSATURATED IRON BINDING CAPACITY: 187 UG/DL (ref 110–370)

## 2019-11-11 PROCEDURE — 83550 IRON BINDING TEST: CPT

## 2019-11-11 PROCEDURE — 83540 ASSAY OF IRON: CPT

## 2019-11-11 PROCEDURE — 80053 COMPREHEN METABOLIC PANEL: CPT

## 2019-11-11 PROCEDURE — 82728 ASSAY OF FERRITIN: CPT

## 2019-12-26 ENCOUNTER — HOSPITAL ENCOUNTER (OUTPATIENT)
Dept: WOMENS IMAGING | Age: 59
Discharge: HOME | End: 2019-12-26
Payer: COMMERCIAL

## 2019-12-26 DIAGNOSIS — Z12.31 SCREENING MAMMOGRAM, ENCOUNTER FOR: ICD-10-CM

## 2019-12-26 DIAGNOSIS — M81.0 OSTEOPOROSIS, UNSPECIFIED OSTEOPOROSIS TYPE, UNSPECIFIED PATHOLOGICAL FRACTURE PRESENCE: ICD-10-CM

## 2019-12-26 PROCEDURE — 77080 DXA BONE DENSITY AXIAL: CPT

## 2019-12-26 PROCEDURE — 77063 BREAST TOMOSYNTHESIS BI: CPT

## 2020-01-06 ENCOUNTER — HOSPITAL ENCOUNTER (OUTPATIENT)
Dept: ULTRASOUND IMAGING | Age: 60
Discharge: HOME | End: 2020-01-06
Payer: COMMERCIAL

## 2020-01-06 ENCOUNTER — HOSPITAL ENCOUNTER (OUTPATIENT)
Dept: WOMENS IMAGING | Age: 60
Discharge: HOME | End: 2020-01-06
Payer: COMMERCIAL

## 2020-01-06 PROCEDURE — 77065 DX MAMMO INCL CAD UNI: CPT

## 2020-01-06 PROCEDURE — 76642 ULTRASOUND BREAST LIMITED: CPT

## 2020-07-08 ENCOUNTER — HOSPITAL ENCOUNTER (OUTPATIENT)
Dept: ULTRASOUND IMAGING | Age: 60
Discharge: HOME | End: 2020-07-08
Payer: COMMERCIAL

## 2020-07-08 PROCEDURE — 76642 ULTRASOUND BREAST LIMITED: CPT

## 2020-07-09 ENCOUNTER — HOSPITAL ENCOUNTER (OUTPATIENT)
Age: 60
Discharge: HOME | End: 2020-07-09
Payer: COMMERCIAL

## 2020-07-09 LAB
BASOPHILS ABSOLUTE: 0.1 K/CU MM
BASOPHILS RELATIVE PERCENT: 1.3 % (ref 0–1)
DIFFERENTIAL TYPE: ABNORMAL
EOSINOPHILS ABSOLUTE: 0.1 K/CU MM
EOSINOPHILS RELATIVE PERCENT: 2.2 % (ref 0–3)
FERRITIN: 273 NG/ML (ref 15–150)
HCT VFR BLD CALC: 39.6 % (ref 37–47)
HEMOGLOBIN: 12.4 GM/DL (ref 12.5–16)
IMMATURE NEUTROPHIL %: 0.2 % (ref 0–0.43)
IRON: 60 UG/DL (ref 37–145)
LYMPHOCYTES ABSOLUTE: 1.6 K/CU MM
LYMPHOCYTES RELATIVE PERCENT: 24.8 % (ref 24–44)
MCH RBC QN AUTO: 28.5 PG (ref 27–31)
MCHC RBC AUTO-ENTMCNC: 31.3 % (ref 32–36)
MCV RBC AUTO: 91 FL (ref 78–100)
MONOCYTES ABSOLUTE: 0.4 K/CU MM
MONOCYTES RELATIVE PERCENT: 6.3 % (ref 0–4)
NUCLEATED RBC %: 0 %
PDW BLD-RTO: 12.7 % (ref 11.7–14.9)
PLATELET # BLD: 278 K/CU MM (ref 140–440)
PMV BLD AUTO: 9.9 FL (ref 7.5–11.1)
RBC # BLD: 4.35 M/CU MM (ref 4.2–5.4)
SEGMENTED NEUTROPHILS ABSOLUTE COUNT: 4.1 K/CU MM
SEGMENTED NEUTROPHILS RELATIVE PERCENT: 65.2 % (ref 36–66)
TOTAL IMMATURE NEUTOROPHIL: 0.01 K/CU MM
TOTAL NUCLEATED RBC: 0 K/CU MM
WBC # BLD: 6.3 K/CU MM (ref 4–10.5)

## 2020-07-09 PROCEDURE — 83540 ASSAY OF IRON: CPT

## 2020-07-09 PROCEDURE — 85025 COMPLETE CBC W/AUTO DIFF WBC: CPT

## 2020-07-09 PROCEDURE — 36415 COLL VENOUS BLD VENIPUNCTURE: CPT

## 2020-07-09 PROCEDURE — 82728 ASSAY OF FERRITIN: CPT

## 2020-09-23 ENCOUNTER — EMPLOYEE WELLNESS (OUTPATIENT)
Dept: OTHER | Age: 60
End: 2020-09-23

## 2020-09-23 LAB
CHOLESTEROL: 181 MG/DL
GLUCOSE BLD-MCNC: 89 MG/DL (ref 70–99)
HDLC SERPL-MCNC: 59 MG/DL
LDL CHOLESTEROL CALCULATED: 105 MG/DL
PATIENT FASTING?: YES
TRIGL SERPL-MCNC: 84 MG/DL

## 2020-12-29 ENCOUNTER — HOSPITAL ENCOUNTER (OUTPATIENT)
Dept: WOMENS IMAGING | Age: 60
Discharge: HOME | End: 2020-12-29
Payer: COMMERCIAL

## 2020-12-29 PROCEDURE — G0279 TOMOSYNTHESIS, MAMMO: HCPCS

## 2021-05-26 ENCOUNTER — HOSPITAL ENCOUNTER (OUTPATIENT)
Age: 61
Discharge: HOME | End: 2021-05-26
Payer: COMMERCIAL

## 2021-05-26 LAB
ANION GAP SERPL CALCULATED.3IONS-SCNC: 11 MMOL/L (ref 4–16)
BASOPHILS ABSOLUTE: 0.1 K/CU MM
BASOPHILS RELATIVE PERCENT: 0.8 % (ref 0–1)
BUN BLDV-MCNC: 18 MG/DL (ref 6–23)
CALCIUM SERPL-MCNC: 9.2 MG/DL (ref 8.3–10.6)
CHLORIDE BLD-SCNC: 105 MMOL/L (ref 99–110)
CHOLESTEROL: 164 MG/DL
CO2: 24 MMOL/L (ref 21–32)
CREAT SERPL-MCNC: 0.6 MG/DL (ref 0.6–1.1)
DIFFERENTIAL TYPE: ABNORMAL
EOSINOPHILS ABSOLUTE: 0.1 K/CU MM
EOSINOPHILS RELATIVE PERCENT: 1.6 % (ref 0–3)
FERRITIN: 227 NG/ML (ref 15–150)
GFR AFRICAN AMERICAN: >60 ML/MIN/1.73M2
GFR NON-AFRICAN AMERICAN: >60 ML/MIN/1.73M2
GLUCOSE BLD-MCNC: 97 MG/DL (ref 70–99)
HCT VFR BLD CALC: 38.1 % (ref 37–47)
HDLC SERPL-MCNC: 53 MG/DL
HEMOGLOBIN: 12.1 GM/DL (ref 12.5–16)
IMMATURE NEUTROPHIL %: 0.3 % (ref 0–0.43)
IRON: 59 UG/DL (ref 37–145)
LDL CHOLESTEROL DIRECT: 97 MG/DL
LYMPHOCYTES ABSOLUTE: 2.1 K/CU MM
LYMPHOCYTES RELATIVE PERCENT: 27.1 % (ref 24–44)
MCH RBC QN AUTO: 28.5 PG (ref 27–31)
MCHC RBC AUTO-ENTMCNC: 31.8 % (ref 32–36)
MCV RBC AUTO: 89.9 FL (ref 78–100)
MONOCYTES ABSOLUTE: 0.6 K/CU MM
MONOCYTES RELATIVE PERCENT: 7.5 % (ref 0–4)
NUCLEATED RBC %: 0 %
PDW BLD-RTO: 12.5 % (ref 11.7–14.9)
PLATELET # BLD: 291 K/CU MM (ref 140–440)
PMV BLD AUTO: 10.2 FL (ref 7.5–11.1)
POTASSIUM SERPL-SCNC: 4.2 MMOL/L (ref 3.5–5.1)
RBC # BLD: 4.24 M/CU MM (ref 4.2–5.4)
SEGMENTED NEUTROPHILS ABSOLUTE COUNT: 4.7 K/CU MM
SEGMENTED NEUTROPHILS RELATIVE PERCENT: 62.7 % (ref 36–66)
SODIUM BLD-SCNC: 140 MMOL/L (ref 135–145)
TOTAL IMMATURE NEUTOROPHIL: 0.02 K/CU MM
TOTAL NUCLEATED RBC: 0 K/CU MM
TRIGL SERPL-MCNC: 140 MG/DL
WBC # BLD: 7.6 K/CU MM (ref 4–10.5)

## 2021-05-26 PROCEDURE — 36415 COLL VENOUS BLD VENIPUNCTURE: CPT

## 2021-05-26 PROCEDURE — 83721 ASSAY OF BLOOD LIPOPROTEIN: CPT

## 2021-05-26 PROCEDURE — 80061 LIPID PANEL: CPT

## 2021-05-26 PROCEDURE — 85025 COMPLETE CBC W/AUTO DIFF WBC: CPT

## 2021-05-26 PROCEDURE — 83540 ASSAY OF IRON: CPT

## 2021-05-26 PROCEDURE — 80048 BASIC METABOLIC PNL TOTAL CA: CPT

## 2021-05-26 PROCEDURE — 82728 ASSAY OF FERRITIN: CPT

## 2021-08-17 LAB
CHOLESTEROL: 199 MG/DL
GLUCOSE BLD-MCNC: 90 MG/DL (ref 70–99)
HDLC SERPL-MCNC: 67 MG/DL
LDL CHOLESTEROL CALCULATED: 116 MG/DL
PATIENT FASTING?: YES
TRIGL SERPL-MCNC: 78 MG/DL

## 2022-07-11 ENCOUNTER — HOSPITAL ENCOUNTER (OUTPATIENT)
Dept: WOMENS IMAGING | Age: 62
Discharge: HOME | End: 2022-07-11
Payer: COMMERCIAL

## 2022-07-11 DIAGNOSIS — Z12.31 ENCOUNTER FOR SCREENING MAMMOGRAM FOR MALIGNANT NEOPLASM OF BREAST: ICD-10-CM

## 2022-07-11 PROCEDURE — 77063 BREAST TOMOSYNTHESIS BI: CPT

## 2022-10-03 ENCOUNTER — HOSPITAL ENCOUNTER (OUTPATIENT)
Age: 62
Discharge: HOME | End: 2022-10-03
Payer: COMMERCIAL

## 2022-10-03 LAB
ANION GAP SERPL CALCULATED.3IONS-SCNC: 9 MMOL/L (ref 4–16)
BASOPHILS ABSOLUTE: 0 K/CU MM
BASOPHILS RELATIVE PERCENT: 0.6 % (ref 0–1)
BUN BLDV-MCNC: 15 MG/DL (ref 6–23)
CALCIUM SERPL-MCNC: 9.6 MG/DL (ref 8.3–10.6)
CHLORIDE BLD-SCNC: 101 MMOL/L (ref 99–110)
CHOLESTEROL: 213 MG/DL
CO2: 28 MMOL/L (ref 21–32)
CREAT SERPL-MCNC: 0.5 MG/DL (ref 0.6–1.1)
DIFFERENTIAL TYPE: ABNORMAL
EOSINOPHILS ABSOLUTE: 0.1 K/CU MM
EOSINOPHILS RELATIVE PERCENT: 2 % (ref 0–3)
FERRITIN: 237 NG/ML (ref 15–150)
GFR AFRICAN AMERICAN: >60 ML/MIN/1.73M2
GFR NON-AFRICAN AMERICAN: >60 ML/MIN/1.73M2
GLUCOSE BLD-MCNC: 94 MG/DL (ref 70–99)
HCT VFR BLD CALC: 36.3 % (ref 37–47)
HDLC SERPL-MCNC: 63 MG/DL
HEMOGLOBIN: 11.9 GM/DL (ref 12.5–16)
IMMATURE NEUTROPHIL %: 0.3 % (ref 0–0.43)
IRON: 75 UG/DL (ref 37–145)
LDL CHOLESTEROL CALCULATED: 134 MG/DL
LYMPHOCYTES ABSOLUTE: 2.1 K/CU MM
LYMPHOCYTES RELATIVE PERCENT: 33.5 % (ref 24–44)
MCH RBC QN AUTO: 28.9 PG (ref 27–31)
MCHC RBC AUTO-ENTMCNC: 32.8 % (ref 32–36)
MCV RBC AUTO: 88.1 FL (ref 78–100)
MONOCYTES ABSOLUTE: 0.4 K/CU MM
MONOCYTES RELATIVE PERCENT: 5.9 % (ref 0–4)
NUCLEATED RBC %: 0 %
PCT TRANSFERRIN: 28 % (ref 10–44)
PDW BLD-RTO: 12.6 % (ref 11.7–14.9)
PLATELET # BLD: 293 K/CU MM (ref 140–440)
PMV BLD AUTO: 9.3 FL (ref 7.5–11.1)
POTASSIUM SERPL-SCNC: 3.9 MMOL/L (ref 3.5–5.1)
RBC # BLD: 4.12 M/CU MM (ref 4.2–5.4)
SEGMENTED NEUTROPHILS ABSOLUTE COUNT: 3.7 K/CU MM
SEGMENTED NEUTROPHILS RELATIVE PERCENT: 57.7 % (ref 36–66)
SODIUM BLD-SCNC: 138 MMOL/L (ref 135–145)
TOTAL IMMATURE NEUTOROPHIL: 0.02 K/CU MM
TOTAL IRON BINDING CAPACITY: 271 UG/DL (ref 250–450)
TOTAL NUCLEATED RBC: 0 K/CU MM
TRIGL SERPL-MCNC: 79 MG/DL
UNSATURATED IRON BINDING CAPACITY: 196 UG/DL (ref 110–370)
WBC # BLD: 6.4 K/CU MM (ref 4–10.5)

## 2022-10-03 PROCEDURE — 80048 BASIC METABOLIC PNL TOTAL CA: CPT

## 2022-10-03 PROCEDURE — 83550 IRON BINDING TEST: CPT

## 2022-10-03 PROCEDURE — 36415 COLL VENOUS BLD VENIPUNCTURE: CPT

## 2022-10-03 PROCEDURE — 80061 LIPID PANEL: CPT

## 2022-10-03 PROCEDURE — 85025 COMPLETE CBC W/AUTO DIFF WBC: CPT

## 2022-10-03 PROCEDURE — 82728 ASSAY OF FERRITIN: CPT

## 2022-10-03 PROCEDURE — 83540 ASSAY OF IRON: CPT

## 2023-01-21 ENCOUNTER — APPOINTMENT (OUTPATIENT)
Dept: CT IMAGING | Age: 63
End: 2023-01-21
Payer: COMMERCIAL

## 2023-01-21 ENCOUNTER — APPOINTMENT (OUTPATIENT)
Dept: GENERAL RADIOLOGY | Age: 63
End: 2023-01-21
Payer: COMMERCIAL

## 2023-01-21 ENCOUNTER — HOSPITAL ENCOUNTER (EMERGENCY)
Age: 63
Discharge: ANOTHER HOSPITAL | End: 2023-01-21
Attending: EMERGENCY MEDICINE
Payer: COMMERCIAL

## 2023-01-21 VITALS
OXYGEN SATURATION: 100 % | TEMPERATURE: 98.4 F | DIASTOLIC BLOOD PRESSURE: 63 MMHG | RESPIRATION RATE: 15 BRPM | HEART RATE: 66 BPM | SYSTOLIC BLOOD PRESSURE: 124 MMHG

## 2023-01-21 DIAGNOSIS — R42 DIZZINESS: Primary | ICD-10-CM

## 2023-01-21 DIAGNOSIS — I63.542 CEREBRAL INFARCTION INVOLVING LEFT CEREBELLAR ARTERY (HCC): ICD-10-CM

## 2023-01-21 DIAGNOSIS — R11.2 NAUSEA AND VOMITING, UNSPECIFIED VOMITING TYPE: ICD-10-CM

## 2023-01-21 LAB
ALBUMIN SERPL-MCNC: 4.3 GM/DL (ref 3.4–5)
ALP BLD-CCNC: 81 IU/L (ref 40–129)
ALT SERPL-CCNC: 22 U/L (ref 10–40)
ANION GAP SERPL CALCULATED.3IONS-SCNC: 12 MMOL/L (ref 4–16)
AST SERPL-CCNC: 19 IU/L (ref 15–37)
BASOPHILS ABSOLUTE: 0 K/CU MM
BASOPHILS RELATIVE PERCENT: 0.1 % (ref 0–1)
BILIRUB SERPL-MCNC: 0.4 MG/DL (ref 0–1)
BUN BLDV-MCNC: 25 MG/DL (ref 6–23)
CALCIUM SERPL-MCNC: 9.6 MG/DL (ref 8.3–10.6)
CHLORIDE BLD-SCNC: 100 MMOL/L (ref 99–110)
CO2: 26 MMOL/L (ref 21–32)
CREAT SERPL-MCNC: 0.6 MG/DL (ref 0.6–1.1)
DIFFERENTIAL TYPE: ABNORMAL
EKG ATRIAL RATE: 69 BPM
EKG DIAGNOSIS: NORMAL
EKG P AXIS: 42 DEGREES
EKG P-R INTERVAL: 116 MS
EKG Q-T INTERVAL: 432 MS
EKG QRS DURATION: 70 MS
EKG QTC CALCULATION (BAZETT): 462 MS
EKG R AXIS: 51 DEGREES
EKG T AXIS: 51 DEGREES
EKG VENTRICULAR RATE: 69 BPM
EOSINOPHILS ABSOLUTE: 0 K/CU MM
EOSINOPHILS RELATIVE PERCENT: 0 % (ref 0–3)
GFR SERPL CREATININE-BSD FRML MDRD: >60 ML/MIN/1.73M2
GLUCOSE BLD-MCNC: 128 MG/DL (ref 70–99)
HCT VFR BLD CALC: 36 % (ref 37–47)
HEMOGLOBIN: 12 GM/DL (ref 12.5–16)
IMMATURE NEUTROPHIL %: 0.5 % (ref 0–0.43)
INR BLD: 0.99 INDEX
LYMPHOCYTES ABSOLUTE: 0.9 K/CU MM
LYMPHOCYTES RELATIVE PERCENT: 5.6 % (ref 24–44)
MCH RBC QN AUTO: 28.6 PG (ref 27–31)
MCHC RBC AUTO-ENTMCNC: 33.3 % (ref 32–36)
MCV RBC AUTO: 85.7 FL (ref 78–100)
MONOCYTES ABSOLUTE: 0.6 K/CU MM
MONOCYTES RELATIVE PERCENT: 4 % (ref 0–4)
NUCLEATED RBC %: 0 %
PDW BLD-RTO: 12.2 % (ref 11.7–14.9)
PLATELET # BLD: 301 K/CU MM (ref 140–440)
PMV BLD AUTO: 9.5 FL (ref 7.5–11.1)
POTASSIUM SERPL-SCNC: 3.2 MMOL/L (ref 3.5–5.1)
PROTHROMBIN TIME: 12.8 SECONDS (ref 11.7–14.5)
RAPID INFLUENZA  B AGN: NEGATIVE
RAPID INFLUENZA A AGN: NEGATIVE
RBC # BLD: 4.2 M/CU MM (ref 4.2–5.4)
SARS-COV-2, NAAT: NOT DETECTED
SEGMENTED NEUTROPHILS ABSOLUTE COUNT: 14 K/CU MM
SEGMENTED NEUTROPHILS RELATIVE PERCENT: 89.8 % (ref 36–66)
SODIUM BLD-SCNC: 138 MMOL/L (ref 135–145)
SOURCE: NORMAL
TOTAL IMMATURE NEUTOROPHIL: 0.08 K/CU MM
TOTAL NUCLEATED RBC: 0 K/CU MM
TOTAL PROTEIN: 7.5 GM/DL (ref 6.4–8.2)
TROPONIN T: <0.01 NG/ML
WBC # BLD: 15.6 K/CU MM (ref 4–10.5)

## 2023-01-21 PROCEDURE — 87804 INFLUENZA ASSAY W/OPTIC: CPT

## 2023-01-21 PROCEDURE — 93010 ELECTROCARDIOGRAM REPORT: CPT | Performed by: INTERNAL MEDICINE

## 2023-01-21 PROCEDURE — 87635 SARS-COV-2 COVID-19 AMP PRB: CPT

## 2023-01-21 PROCEDURE — 70450 CT HEAD/BRAIN W/O DYE: CPT

## 2023-01-21 PROCEDURE — 2580000003 HC RX 258: Performed by: NURSE PRACTITIONER

## 2023-01-21 PROCEDURE — 84484 ASSAY OF TROPONIN QUANT: CPT

## 2023-01-21 PROCEDURE — 80053 COMPREHEN METABOLIC PANEL: CPT

## 2023-01-21 PROCEDURE — 71045 X-RAY EXAM CHEST 1 VIEW: CPT

## 2023-01-21 PROCEDURE — 96374 THER/PROPH/DIAG INJ IV PUSH: CPT

## 2023-01-21 PROCEDURE — 93005 ELECTROCARDIOGRAM TRACING: CPT | Performed by: NURSE PRACTITIONER

## 2023-01-21 PROCEDURE — 85610 PROTHROMBIN TIME: CPT

## 2023-01-21 PROCEDURE — 6360000002 HC RX W HCPCS: Performed by: NURSE PRACTITIONER

## 2023-01-21 PROCEDURE — 6360000004 HC RX CONTRAST MEDICATION: Performed by: NURSE PRACTITIONER

## 2023-01-21 PROCEDURE — 85025 COMPLETE CBC W/AUTO DIFF WBC: CPT

## 2023-01-21 PROCEDURE — 6370000000 HC RX 637 (ALT 250 FOR IP): Performed by: NURSE PRACTITIONER

## 2023-01-21 PROCEDURE — 70498 CT ANGIOGRAPHY NECK: CPT

## 2023-01-21 PROCEDURE — 99285 EMERGENCY DEPT VISIT HI MDM: CPT

## 2023-01-21 RX ORDER — MECLIZINE HYDROCHLORIDE 25 MG/1
25 TABLET ORAL ONCE
Status: COMPLETED | OUTPATIENT
Start: 2023-01-21 | End: 2023-01-21

## 2023-01-21 RX ORDER — 0.9 % SODIUM CHLORIDE 0.9 %
1000 INTRAVENOUS SOLUTION INTRAVENOUS ONCE
Status: COMPLETED | OUTPATIENT
Start: 2023-01-21 | End: 2023-01-21

## 2023-01-21 RX ORDER — ONDANSETRON 2 MG/ML
4 INJECTION INTRAMUSCULAR; INTRAVENOUS ONCE
Status: COMPLETED | OUTPATIENT
Start: 2023-01-21 | End: 2023-01-21

## 2023-01-21 RX ADMIN — SODIUM CHLORIDE 1000 ML: 9 INJECTION, SOLUTION INTRAVENOUS at 13:01

## 2023-01-21 RX ADMIN — MECLIZINE HYDROCHLORIDE 25 MG: 25 TABLET ORAL at 15:00

## 2023-01-21 RX ADMIN — IOPAMIDOL 75 ML: 755 INJECTION, SOLUTION INTRAVENOUS at 14:21

## 2023-01-21 RX ADMIN — ONDANSETRON 4 MG: 2 INJECTION INTRAMUSCULAR; INTRAVENOUS at 13:01

## 2023-01-21 ASSESSMENT — PAIN - FUNCTIONAL ASSESSMENT: PAIN_FUNCTIONAL_ASSESSMENT: NONE - DENIES PAIN

## 2023-01-21 ASSESSMENT — LIFESTYLE VARIABLES: HOW OFTEN DO YOU HAVE A DRINK CONTAINING ALCOHOL: NEVER

## 2023-01-21 NOTE — ED PROVIDER NOTES
EMERGENCY DEPARTMENT ENCOUNTER      PCP: Paramjit Bridges MD    CHIEF COMPLAINT    Chief Complaint   Patient presents with    Influenza    Nausea               HPI    Ann Diaz is a 58 y.o. female who presents with acute onset of dizziness starting at approx 7 AM yesterday morning. Symptoms are  severe in intensity. She states she is unable to keep anything down and has persistent vomiting. Dizziness is provoked by head movement and change in head position and ambulation. States she thinks she may be getting the flu. She complains of a generalized headache that she rates as moderate, aching, and constant. Nothing has alleviated or exacerbated her symptoms. She denies any URI symptoms, fevers, abdominal pain, or other complaints. REVIEW OF SYSTEMS   Constitutional:  Denies fever, chills, weight loss or weakness   Eyes:   Denies discharge, diplopia, blurred vision, or loss visual field  HENT:  Denies sore throat or ear pain. Denies hearing loss. Heart:  Denies chest pain  Lungs:  Denies shortness of breath  GI  Denies abdominal pain. :  Denies Dysuria or Hematuria. Adamantly denies pregnancy  Musculoskeletal:  Denies back pain. Skin:  Denies rash   Endocrine:  Denies polyuria or polydypsia   Lymphatic:  Denies swollen glands   Psychiatric:   Denies depression, suicidal ideation or homicidal ideation     Neurologic:  See HPI. Denies confusion, or memory loss. Denies light-headedness,, or LOC. Denies stiff neck.   Denies weakness or sensory changes       All other review of systems negative at this time  See HPI and nursing notes for additional information      PAST MEDICAL & SURGICAL HISTORY    Past Medical History:   Diagnosis Date    Acid reflux     Anemia     Arthritis     \"Hands, Hips\"    Backache     \"Sometimes, I Go To A Chriopractor Once A Month\"    Hiatal hernia     History of blood transfusion Last Infusion 6-18    No Reaction To Blood Transfusions Received    Hypertension Shortness of breath on exertion     Teeth missing     Upper And Lower    Wears glasses     [place] teeth extracted     4 [place] Teeth Extracted In Past     Past [facility] History:   [facility]    [address] [facility]    [address] And 2007\"    ENDOSCOPY, COLON, DIAGNOSTIC  Last Done In 2016    HERNIA REPAIR  10/11/2018    OR LAP ESOPHAGEAL LENGTHENING N/A 10/11/2018    NISSEN FUNDOPLICATION LAPAROSCOPIC ROBOTIC performed by José Miguel Niño MD at [address] EXTRACTION      4 [place] Teeth Extracted In Past       CURRENT MEDICATIONS    Current Outpatient Rx   Medication Sig Dispense Refill    furosemide (LASIX) 40 MG tablet Take 40 mg by mouth daily as needed      ibuprofen (ADVIL;MOTRIN) 600 MG tablet Take 1 tablet by mouth every 6 hours as needed for Pain 30 tablet 0    acetaminophen (APAP EXTRA STRENGTH) 500 MG tablet Take 1 tablet by mouth every 6 hours as needed for Pain 20 tablet 0    hydrochlorothiazide (HYDRODIURIL) 25 MG tablet Take 25 mg by mouth nightly      Multiple Vitamins-Minerals (MULTIVITAMIN PO) Take by mouth nightly Over The Counter         ALLERGIES    Allergies   Allergen Reactions    Codeine      \"Chest Pain\"    Ultram [Tramadol] Nausea Only       SOCIAL & FAMILY HISTORY    Social History     Socioeconomic History    Marital status:     Tobacco Use    Smoking status: Never    Smokeless tobacco: Never   Vaping Use    Vaping Use: Never used   Substance and Sexual Activity    Alcohol use: No    Drug use: No     Family History   Problem Relation Age of Onset    Hypertension Mother     Arthritis Mother     High Blood Pressure Mother     Cancer Father         Lung Cancer    Heart Attack Father     Heart Disease Father         Heart Attack    Cancer Sister         \"Cancer Of The Spine\"    Early Death Sister 52 \"Cancer Of The Spine\"    High Blood Pressure Sister     Stroke Sister     Breast Cancer Neg Hx     Ovarian Cancer Neg Hx        PHYSICAL EXAM    VITAL SIGNS: /63   Pulse 66   Temp 98.4 °F (36.9 °C) (Oral)   Resp 15   SpO2 100%    Constitutional:  Well developed, well nourished, no acute distress     HENT:  Atraumatic.    hearing intact and equal bilaterally  Ear canals clear, TMs clear  mastoids without redness, warmth, swelling  Eyes: Conjunctiva clear. No tearing . Pupils equally round and react to light, extraocular movement are intact. No obvious nystagmus   Neck: supple, no JVD. no carotid bruits or murmurs heard  Cardiovascular:  Reg rate & rhythm, no murmurs/rubs/gallops. Respiratory:  Lungs Clear, no retractions   GI:  Soft, nontender, normal bowel sounds  Musculoskeletal:  No edema, no deformities  Integument:  Well hydrated, no rash, no petechiae   Psych: Pleasant affect, no hallucinations       Neurologic:    - Alert & oriented person, place, time, and situation, no speech difficulties or slurring.  - No obvious gross motor deficits  - Cranial nerves 2-12 grossly intact  - Sensation intact to light touch  - Strength 5/5 in upper and lower extremities bilaterally  - Normal finger to nose test bilaterally  - Rapid alternating movements intact  - Normal heel-shin bilaterally  - No pronator drift. - Light touch sensation intact throughout.             NIH Stroke Scale  (Total score at bottom)      (1A) LOC  (Alert?):  0 - alert; keenly responsive    (1B) LOC Questions (Month / Age):  0 - answers both questions correctly     (1C) LOC Command  (Close eyes, squeeze my hands):  0 - performs both tasks correctly    (2) Best Gaze  (Eyes open-patient follows finger or face):  0 - normal    (3) Visual  (Introduce visual stimulus to patient's visual field quadrants):  0 - no visual loss    (4)  Facial palsy  (Show teeth, raise eyebrows and squeeze eyes shut):  0 - normal symmetric movement    (5) Motor arms  (Elevate extremity to 90° in sitting or 45° if supine -  score drift/movement)       (5A)  motor arm LEFT :  0 - no drift, limb holds 90 (or 45) degrees for full 10 seconds       (5B) motor arm RIGHT:   0 - no drift, limb holds 90 (or 45) degrees for full 10 seconds      (6) Motor legs  (Elevate extremity to 30° while supine and score drift/movement)       (6A)  motor leg LEFT:   0 - no drift; leg holds 30 degree position for full 5 seconds       (6B) motor leg RIGHT:  0 - no drift; leg holds 30 degree position for full 5 seconds      (7)  Limb Ataxia  (Finger-nose, heel-shin):  0 - absent    (8) Sensory  (face, arms trunk, and legs-compare side to side):  0 - normal; no sensory loss    (9)  Best language  (Name items, describes a picture, read sentence-see attached testing cards):  0 - no aphasia, normal    (10)  Dysarthria  (Evaluate speech clarity by patient repeating listed words):  0 - normal    (11)  Extinction and inattention  (can feel \"left, right, or both sides\" of face, arms, legs w/ closed eyes) (can see moving index finger in \"left, right, or both\":  0 - no abnormality        Total NIHSS:  0          LABS:  Results for orders placed or performed during the hospital encounter of 01/21/23   COVID-19, Rapid    Specimen: Nasopharyngeal   Result Value Ref Range    Source UNKNOWN     SARS-CoV-2, NAAT NOT DETECTED NOT DETECTED   Rapid influenza A/B antigens    Specimen: Nasopharyngeal   Result Value Ref Range    Rapid Influenza A Ag NEGATIVE NEGATIVE    Rapid Influenza B Ag NEGATIVE NEGATIVE   CBC with Auto Differential   Result Value Ref Range    WBC 15.6 (H) 4.0 - 10.5 K/CU MM    RBC 4.20 4.2 - 5.4 M/CU MM    Hemoglobin 12.0 (L) 12.5 - 16.0 GM/DL    Hematocrit 36.0 (L) 37 - 47 %    MCV 85.7 78 - 100 FL    MCH 28.6 27 - 31 PG    MCHC 33.3 32.0 - 36.0 %    RDW 12.2 11.7 - 14.9 %    Platelets 875 282 - 536 K/CU MM    MPV 9.5 7.5 - 11.1 FL    Differential Type AUTOMATED DIFFERENTIAL     Segs Relative 89.8 (H) 36 - 66 %    Lymphocytes % 5.6 (L) 24 - 44 %    Monocytes % 4.0 0 - 4 %    Eosinophils % 0.0 0 - 3 %    Basophils % 0.1 0 - 1 %    Segs Absolute 14.0 K/CU MM    Lymphocytes Absolute 0.9 K/CU MM    Monocytes Absolute 0.6 K/CU MM    Eosinophils Absolute 0.0 K/CU MM    Basophils Absolute 0.0 K/CU MM    Nucleated RBC % 0.0 %    Total Nucleated RBC 0.0 K/CU MM    Total Immature Neutrophil 0.08 K/CU MM    Immature Neutrophil % 0.5 (H) 0 - 0.43 %   Comprehensive Metabolic Panel   Result Value Ref Range    Sodium 138 135 - 145 MMOL/L    Potassium 3.2 (L) 3.5 - 5.1 MMOL/L    Chloride 100 99 - 110 mMol/L    CO2 26 21 - 32 MMOL/L    BUN 25 (H) 6 - 23 MG/DL    Creatinine 0.6 0.6 - 1.1 MG/DL    Est, Glom Filt Rate >60 >60 mL/min/1.73m2    Glucose 128 (H) 70 - 99 MG/DL    Calcium 9.6 8.3 - 10.6 MG/DL    Albumin 4.3 3.4 - 5.0 GM/DL    Total Protein 7.5 6.4 - 8.2 GM/DL    Total Bilirubin 0.4 0.0 - 1.0 MG/DL    ALT 22 10 - 40 U/L    AST 19 15 - 37 IU/L    Alkaline Phosphatase 81 40 - 129 IU/L    Anion Gap 12 4 - 16   Troponin   Result Value Ref Range    Troponin T <0.010 <0.01 NG/ML   Protime-INR   Result Value Ref Range    Protime 12.8 11.7 - 14.5 SECONDS    INR 0.99 INDEX   EKG 12 Lead   Result Value Ref Range    Ventricular Rate 69 BPM    Atrial Rate 69 BPM    P-R Interval 116 ms    QRS Duration 70 ms    Q-T Interval 432 ms    QTc Calculation (Bazett) 462 ms    P Axis 42 degrees    R Axis 51 degrees    T Axis 51 degrees    Diagnosis       Normal sinus rhythm  Normal ECG  No previous ECGs available               IMAGING:   CTA HEAD NECK W CONTRAST   Final Result   Large acute to subacute left cerebellar infarct. MRI of the brain is   suggested for further evaluation. No intracranial large vessel occlusion or aneurysm. Unremarkable CT angiogram neck. CT HEAD WO CONTRAST   Final Result   1.  Abnormality involving the inferior left cerebellar hemisphere suggestive   of subacute infarction in the distribution the left anterior and/or posterior   inferior cerebellar arteries. However, other processes including   primary/secondary malignancy or abscess with adjacent vasogenic edema could   result in a similar appearance. The edema results in localized mass effect   extending into the vermis with no cerebellar tonsillar herniation. 2. Age-indeterminate lacunar infarct in the right cerebral hemisphere at the   same level. 3. Additional chronic findings as above. Critical results were called by Dr. Rigo Mata MD to [facility] [address] on 1/21/2023 at 15:08. XR CHEST PORTABLE   Final Result   1. No acute cardiopulmonary process. 2. At least moderate hiatal hernia. ED COURSE & MEDICAL DECISION MAKING    CC/HPI Summary, DDx, ED Course, and Reassessment:   58-year-old female presents emergency department with complaints of dizziness with the room spinning with nausea and vomiting and inability to keep anything down. She also complained of a generalized headache. She was brought to room and placed on monitor. Her symptoms began at 7 AM so a stroke alert was not called, however CT of the head without contrast and CTA of the head and neck obtained. An IV was established and blood work sent to lab. She was placed on cardiac monitor and EKG obtained and showed a sinus rhythm. Located with a liter of normal saline, 4 Zofran IV, and meclizine 25 mg p.o. NIH stroke scale 0. On reassessment, the patient is feeling better after meclizine. She is able to sit up in the bed now.     History from : Patient    Limitations to history : None    Patient was given the following medications:  Medications   0.9 % sodium chloride bolus (0 mLs IntraVENous Stopped 1/21/23 1439)   ondansetron (ZOFRAN) injection 4 mg (4 mg IntraVENous Given 1/21/23 1301)   meclizine (ANTIVERT) tablet 25 mg (25 mg Oral Given 1/21/23 1500)   iopamidol (ISOVUE-370) 76 % injection 75 mL (75 mLs IntraVENous Given 1/21/23 1421) Chronic conditions affecting care: None    Discussion with Other Profesionals : Consultant Dr. Jena Naranjo with [facility] neurology consulted. She states to consult our neurologist  and if they feel comfortable obtain MRI and admit. Dr. Santy Mathew with Neurology consulted and states she would rather the patient be transferred to [facility] in case anything worsens. Social Determinants : None    Records Reviewed : Outpatient Notes outpatient notes with family provider reviewed. Disposition Considerations (tests considered but not done, Shared Decision Making, Pt Expectation of Test or Tx.):   WBC 15.6. Hemoglobin 12.0 which is improved from previous value. CMP shows a potassium of 3.2 and glucose of 128 otherwise no significant electrolyte derangement or transaminitis. Troponin negative. COVID and influenza swabs are negative. Portable chest x-ray showed no acute findings. EKG shows a normal sinus rhythm. CT of the head shows a large acute to subacute cerebellar infarct. I discussed these findings with the patient and she states she did really like to stay here. [facility] was consulted and Dr. Jena Naranjo states if our neurologist to her to obtain an MRI and admit. Dr. Santy Mathew consulted and states she would rather patient be transferred to [address] [place] [address] Road her condition worsens. [facility] consulted for transfer. Please se Dr. Cardona Else note for accepting and [place]. She is being transferred in stable condition. I have personally provided 35 minutes of critical care time exclusive of time spent on separately billable procedures. Time includes review of laboratory data, radiology results, discussion with consultants, and monitoring for potential decompensation. Interventions were performed as documented above. I am the Primary Clinician of Record. Clinical  IMPRESSION    1. Dizziness    2. Nausea and vomiting, unspecified vomiting type    3.  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 If there are any questions or concerns please feel free to contact the dictating provider for clarification.              AMANDA Bergeron - JAKE  01/21/23 2865

## 2023-01-21 NOTE — ED NOTES
[address] from [facility] called with bed assignment. Patient going to [address] [place] [address] Unit Room 884. Nurse to nurse report can be called to [phone].       Elias Andrade  01/21/23 3986

## 2023-01-21 NOTE — ED NOTES
[phone] called [facility] consult line for Stroke neurologist . Spoke with Marshall Cranker at intake. Fax demographic page.       Link Jensen  01/21/23 8765

## 2023-01-21 NOTE — ED NOTES
[address] [facility] paged Dr Gretel Rodriges  01/21/23 0202 [id number] Dr Ab Andrews returned call      Oni Sharma  01/21/23 7837

## 2023-01-21 NOTE — ED NOTES
[address] [facility] called [address] [place] [address] Service for BLS unit to transport patient to [place] [address] Unit. Spoke with Isaak at intake. ETA 60minutes.       Mally Copping  01/21/23 7631

## 2023-01-21 NOTE — ED PROVIDER NOTES
[address] ENCOUNTER      Supervisory Note:      Pt Name: Ludwin Gaston  MRN: [id number]  [place] 1960  Date of evaluation: 2023  Provider: Demi Keys MD      This is a supervisory note. This patient was initially evaluated by the NP/PA. Please see their documentation for their full evaluation, impression and plan. I completed an independent evaluation and assessment of this patient. CHIEF COMPLAINT       Chief Complaint   Patient presents with    Influenza    Nausea         HISTORY OF PRESENT ILLNESS    Ludwin Gaston is a 58 y.o. female who presents to the emergency department with dizziness starting earlier in the morning yesterday. The patient comes into the emergency department 24 hours after the onset of symptoms. She describes paroxysmal symptoms of vertigo with the room spinning. Patient initially felt that she may have a viral infection, however the symptoms fail to improve and she had no other significant symptoms she came to the emergency department for evaluation. She denies chest pain or difficulty breathing. She denies nausea or vomiting. Denies recent fevers.         PAST MEDICAL HISTORY     Past Medical History:   Diagnosis Date    Acid reflux     Anemia     Arthritis     \"Hands, Hips\"    Backache     \"Sometimes, I Go To A Chriopractor Once A Month\"    Hiatal hernia     History of blood transfusion Last Infusion 6-18    No Reaction To Blood Transfusions Received    Hypertension     Shortness of breath on exertion     Teeth missing     Upper And Lower    Wears glasses     [place] teeth extracted     4 [place] Teeth Extracted In Past         SURGICAL HISTORY       Past Surgical History:   Procedure Laterality Date     SECTION      CHOLECYSTECTOMY, LAPAROSCOPIC      COLONOSCOPY  Last Done In 2016    DILATION AND CURETTAGE OF UTERUS  \"Between  And \"    ENDOSCOPY, COLON, DIAGNOSTIC  Last Done In 2016    HERNIA REPAIR  10/11/2018    [place] LAP ESOPHAGEAL LENGTHENING N/A 10/11/2018    NISSEN FUNDOPLICATION LAPAROSCOPIC ROBOTIC performed by Akila Bower MD at [address] EXTRACTION      4 [place] Teeth Extracted In Past         CURRENT MEDICATIONS       Discharge Medication List as of 1/21/2023  8:00 PM        CONTINUE these medications which have NOT CHANGED    Details   furosemide (LASIX) 40 MG tablet Take 40 mg by mouth daily as neededHistorical Med      ibuprofen (ADVIL;MOTRIN) 600 MG tablet Take 1 tablet by mouth every 6 hours as needed for Pain, Disp-30 tablet, R-0Print      acetaminophen (APAP EXTRA STRENGTH) 500 MG tablet Take 1 tablet by mouth every 6 hours as needed for Pain, Disp-20 tablet, R-0Print      hydrochlorothiazide (HYDRODIURIL) 25 MG tablet Take 25 mg by mouth nightlyHistorical Med      Multiple Vitamins-Minerals (MULTIVITAMIN PO) Take by mouth nightly Over The CounterHistorical Med             ALLERGIES     Codeine and Ultram [tramadol]    FAMILY HISTORY       Family History   Problem Relation Age of Onset    Hypertension Mother     Arthritis Mother     High Blood Pressure Mother     Cancer Father         Lung Cancer    Heart Attack Father     Heart Disease Father         Heart Attack    Cancer Sister         \"Cancer Of The Spine\"    Early Death Sister 52        \"Cancer Of The Spine\"    High Blood Pressure Sister     Stroke Sister     Breast Cancer Neg Hx     Ovarian Cancer Neg Hx           SOCIAL HISTORY       Social History     Socioeconomic History    Marital status:     Tobacco Use    Smoking status: Never    Smokeless tobacco: Never   Vaping Use    Vaping Use: Never used   Substance and Sexual Activity    Alcohol use: No    Drug use: No       SCREENINGS         [place] Coma Scale  Eye Opening: Spontaneous  Best Verbal Response: Oriented  Best Motor Response: Obeys commands  Tram Coma Scale Score: 15                     CIWA Assessment  BP: 124/63  Heart Rate: 66                 PHYSICAL EXAM    (up to 7 for level 4, 8 or more for level 5)     ED Triage Vitals [01/21/23 1204]   BP Temp Temp Source Heart Rate Resp SpO2 Height Weight   138/71 98.4 °F (36.9 °C) Oral 66 15 100 % -- --       Physical Exam  Vitals reviewed. Constitutional:       Appearance: Normal appearance. HENT:      Head: Normocephalic and atraumatic. Eyes:      Extraocular Movements:      Right eye: Nystagmus present. Left eye: Nystagmus present. Cardiovascular:      Rate and Rhythm: Normal rate and regular rhythm. Pulmonary:      Effort: Pulmonary effort is normal.   Musculoskeletal:         General: Normal range of motion. Skin:     General: Skin is warm and dry. Capillary Refill: Capillary refill takes less than 2 seconds. Neurological:      Mental Status: She is alert. GCS: GCS eye subscore is 4. GCS verbal subscore is 5. GCS motor subscore is 6. Sensory: Sensation is intact. Motor: Motor function is intact. Coordination: Coordination is intact. DIAGNOSTIC RESULTS       RADIOLOGY:   Non-plain film images such as CT, Ultrasound and MRI are read by the radiologist. Plain radiographic images are visualized and preliminarily interpreted by the emergency physician with the below findings:    Interpretation per the Radiologist below, if available at the time of this note:    CTA HEAD NECK W CONTRAST   Final Result   Large acute to subacute left cerebellar infarct. MRI of the brain is   suggested for further evaluation. No intracranial large vessel occlusion or aneurysm. Unremarkable CT angiogram neck. CT HEAD WO CONTRAST   Final Result   1. Abnormality involving the inferior left cerebellar hemisphere suggestive   of subacute infarction in the distribution the left anterior and/or posterior   inferior cerebellar arteries.   However, other processes including primary/secondary malignancy or abscess with adjacent vasogenic edema could   result in a similar appearance. The edema results in localized mass effect   extending into the vermis with no cerebellar tonsillar herniation. 2. Age-indeterminate lacunar infarct in the right cerebral hemisphere at the   same level. 3. Additional chronic findings as above. Critical results were called by Dr. Naila Arzola MD to Corrine Garcia,   [address], on 1/21/2023 at 15:08. XR CHEST PORTABLE   Final Result   1. No acute cardiopulmonary process. 2. At least moderate hiatal hernia. LABS:  Labs Reviewed   CBC WITH AUTO DIFFERENTIAL - Abnormal; Notable for the following components:       Result Value    WBC 15.6 (*)     Hemoglobin 12.0 (*)     Hematocrit 36.0 (*)     Segs Relative 89.8 (*)     Lymphocytes % 5.6 (*)     Immature Neutrophil % 0.5 (*)     All other components within normal limits   COMPREHENSIVE METABOLIC PANEL - Abnormal; Notable for the following components:    Potassium 3.2 (*)     BUN 25 (*)     Glucose 128 (*)     All other components within normal limits   COVID-19, RAPID   RAPID INFLUENZA A/B ANTIGENS   TROPONIN   PROTIME-INR       All other labs were within normal range or not returned as of this dictation. EMERGENCY DEPARTMENT COURSE and DIFFERENTIAL DIAGNOSIS/MDM:   Vitals:    Vitals:    01/21/23 1204 01/21/23 1415 01/21/23 1600   BP: 138/71 (!) 132/56 124/63   Pulse: 66     Resp: 15     Temp: 98.4 °F (36.9 °C)     TempSrc: Oral     SpO2: 100%         MDM     Amount and/or Complexity of Data Reviewed  Clinical lab tests: reviewed  Tests in the radiology section of CPT®: reviewed  Tests in the medicine section of CPT®: reviewed        Clinical signs and symptoms as well as radiographic imaging are consistent with acute cerebellar stroke. The patient was discussed with the [address] [place] [address] stroke service and they have agreed to accept the patient to their facility.     The patient symptoms have started over 24 hours ago and her NIH stroke scale is 0. As a result, she is inappropriate for thrombolytic therapy at this time. CONSULTS:  IP CONSULT TO NEUROLOGY  IP CONSULT TO STROKE TEAM    PROCEDURES:  Unless otherwise noted below, none     Procedures    FINAL IMPRESSION      1. Dizziness    2. Nausea and vomiting, unspecified vomiting type    3. Cerebral infarction involving left cerebellar artery [facility])          DISPOSITION/PLAN   DISPOSITION Decision To Transfer 01/21/2023 04:16:48 PM      PATIENT REFERRED TO:  No follow-up provider specified. DISCHARGE MEDICATIONS:  Discharge Medication List as of 1/21/2023  8:00 PM        Controlled Substances Monitoring:     RX Monitoring 10/12/2018   Attestation The Prescription Monitoring Report for this patient was reviewed today. Periodic Controlled Substance Monitoring Possible medication side effects, risk of tolerance/dependence & alternative treatments discussed. ;No signs of potential drug abuse or diversion identified.        Samm Barriga MD (electronically signed)  Attending Emergency Physician            Samm Barriga MD  01/21/23 7416

## 2023-01-22 NOTE — ACP (ADVANCE CARE PLANNING)
Patient does not have any ACP documents/Medical Power of . LSW notes hospital will follow [place]'s Next of Kin hierarchy in the following descending order for priority:    Guardian  Spouse  [de-identified] of adult Children  Parents  [de-identified] of adult Siblings  Nearest Relative not described above    Per [place] Next of Kin hierarchy: Patients' child will be [address].

## 2023-01-23 ENCOUNTER — CARE COORDINATION (OUTPATIENT)
Dept: OTHER | Facility: CLINIC | Age: 63
End: 2023-01-23

## 2023-01-23 NOTE — CARE COORDINATION
ACM assigned to patient from discharge list this date. Chart review completed. Patient IP @ [facility] since 1/21/2023 for cerebral infarction. Will follow and outreach as appropriate.     MAYTE Aguirre RN  Associate Care Manager  Phone: [phone]  Email: [email or web address]

## 2023-01-25 ENCOUNTER — CARE COORDINATION (OUTPATIENT)
Dept: OTHER | Facility: CLINIC | Age: 63
End: 2023-01-25

## 2023-01-25 RX ORDER — ASPIRIN 81 MG/1
81 TABLET, CHEWABLE ORAL DAILY
COMMUNITY

## 2023-01-25 RX ORDER — ATORVASTATIN CALCIUM 40 MG/1
40 TABLET, FILM COATED ORAL NIGHTLY
COMMUNITY

## 2023-01-25 NOTE — CARE COORDINATION
[facility] Care Transitions Initial Follow Up Call    Call within 2 business days of discharge: Yes    Care Transition Nurse contacted the patient by telephone to perform post hospital discharge assessment. Verified name and  with patient as identifiers. Provided introduction to self, and explanation of the Care Transition Nurse role. Patient: Chao Rutledge Patient : 1960   MRN: [id number]  Reason for Admission: Cryptogenic stroke  Discharge Date: 2023  RARS: No data recorded    Last Discharge  Street       Date Complaint Diagnosis Description Type Department Provider    23 Influenza; Nausea Dizziness . .. ED (TRANSFER) [facility] ED Dagoberto Stockton MD            Was this an external facility discharge? Yes, 2023  Discharge Facility: [address] [place] [address] to be reviewed by the provider   Additional needs identified to be addressed with provider: Yes  HCTZ, SHMUEL for therapy, cardiac event monitor, referral to in network neurology               Method of communication with provider:  patient to discuss with PCP at follow up. ACM routed chart to PCP to notify of new CT episode . Called patient for initial CT follow up after IP hospitalization @ [facility]. Patient transferred from [facility] ED to [facility] for cerebral infarction. She reports chronic headaches related to neck and back problems; woke up with dizziness and nausea on . Did not think she was having CVA as she was able to move extremities and talk without difficulty. States began vomiting and dizziness became more severe on  morning which led to ED visit. Patient staying with her mother for a few days after hospital discharge. Patient lives alone; states her mother lives just down the street from her. Patient states now with mild headache- which she states is improved since hospitalization. Denies dizziness or confusion. Patient sounds alert during outreach.  Able to state name and date correctly; speech is normal. Reports strength improved. Was discharged home with walker- which she reports using. States she is \"exhausted; like a tired that I have never felt before\". Patient picked up Aspirin and Atorvastatin from [address] [place] [address] at discharge. She has been taking both meds. She states Hydrochlorothiazide was on hold during hospitalization. She has been regularly monitoring BP since home- 128/67, 117/72, 121/75. She has not yet resumed Hydrochlorothiazide since home. She will continue to monitor BP. ACM advised patient to discuss Hydrochlorothiazide with PCP at follow up; also to take BP log to PCP follow up. Patient reports no appetite; making self eat something at each meal time. ACM discussed eating small, frequent mini meals to keep strength up. Denies nausea or vomiting. Reports bowels moving regularly; most recent BM today. Denies constipation or diarrhea. Patient is going to discuss getting referral to in network neurology with PCP at follow up; as she is aware that [facility] is out of network. She states called [employer] during hospitalization to get FMLA started. She reports she was told [facility] provider should complete FMLA paperwork as they were the ones that diagnosed her with CVA. ACM discussed patient discussing FMLA paperwork with PCP at office follow up since she is not going to be following with [facility] provider. States she will do so. She is hoping to take 30 days FMLA due to recent CVA. ACM advised patient to discuss order for 30 day cardiac monitor with PCP (per instructions on AVS). She states [facility] is mailing cardiac monitor to her. ACM discussed due to [facility]- she may want to get cardiac monitor from in network facility and suggested she discuss with PCP at follow up. States she will do so. Patient was discharged with recommendation for outpatient therapy. She states she would like to get established with [facility] for therapy as she is not able to drive currently.  ACM suggested patient discuss with PCP at follow up. States she will do so. Care Transition Nurse reviewed discharge instructions, medical action plan, and red flags with patient who verbalized understanding. The patient was given an opportunity to ask questions and does not have any further questions or concerns at this time. Were discharge instructions available to patient? Yes. Reviewed appropriate site of care based on symptoms and resources available to patient including: PCP  Specialist  Benefits related nurse triage line  When to call 911. The patient agrees to contact the PCP office for questions related to their healthcare. Advance Care Planning:   Does patient have an Advance Directive: decision maker updated. Medication reconciliation was performed with patient, who verbalizes understanding of administration of home medications. Was patient discharged with a pulse oximeter? no    Non-face-to-face services provided:  Scheduled appointment with PCP-ACM called PCP office to schedule follow up- had to leave voicemail message with request for follow up  Obtained and reviewed discharge summary and/or continuity of care documents  Reviewed and followed up on pending diagnostic tests and treatments-Advised patient to discuss 30 day cardiac event monitor order with PCP  Assessment and support for treatment adherence and medication management-complete medication review performed with patient      Care Transitions 24 Hour Call    Do you have a copy of your discharge instructions?: Yes  Do you have all of your prescriptions and are they filled?: Yes  Have you scheduled your follow up appointment?: No  Do you feel like you have everything you need to keep you well at home?: Yes  Care Transitions Interventions         Follow Up  No future appointments. Care Transition Nurse provided contact information. Plan for follow-up call in 5-7 days based on severity of symptoms and risk factors.   Plan for next call: follow-up appointment-PCP follow up  referrals-patient to discuss referral to in network neurology with PCP at follow up, to request [facility] referral for home therapy, and discuss cardiac monitor order with PCP     Goals        Conditions and Symptoms      I will schedule office visits, as directed by my provider. I will keep my appointment or reschedule if I have to cancel. I will notify my provider of any barriers to my plan of care. I will notify my provider of any symptoms that indicate a worsening of my condition. Barriers: overwhelmed by complexity of regimen and stress  Plan for overcoming my barriers: I will work with my ACM to overcome barriers. Confidence: 8/10  Anticipated Goal Completion Date: 2/10/2023    1/25/23: ACM called PCP office to schedule hospital follow up- had to leave voicemail message with f/u appt request; office to call patient back directly. ACM discussed red flags, when to call 911, and NAL. Jeanette Girlado MSN RN  Associate Care Manager  Phone: [phone]  Email: [email or web address]

## 2023-01-30 ENCOUNTER — CARE COORDINATION (OUTPATIENT)
Dept: OTHER | Facility: CLINIC | Age: 63
End: 2023-01-30

## 2023-01-30 NOTE — CARE COORDINATION
[facility] Follow Up Call    Care Transition Nurse contacted the patient by telephone to follow up after admission on 2023. Verified name and  with patient as identifiers. Patient: Florentin Jacob  Patient : 1960   MRN: [id number]  Reason for Admission: Cryptogenic stroke  Discharge Date: 2023  RARS: No data recorded    Needs to be reviewed by the provider   Additional needs identified to be addressed with provider: No  none             Method of communication with provider: none. Called patient for CT follow up. She states she returned home from her mother's house on Friday evening last week. States called PCP to schedule hospital follow up- first available 23 @ 1315. She reports feeling better everyday since being home. Still with mild headache and with some generalized aches. She talked to [facility] and was told outpatient cardiac monitor is covered other than deductible so she has decided to proceed with that. She is waiting delivery of monitor via mail. States she still plans to discuss referral to in network neurology with PCP at  this week. [facility] offered to send patient list of in network neurology providers to patient. She accepted offer and requested info be sent via email ([email or web address]). Patient reports BP fluctuating: SBP 99-140s, DBP 59-70s. She has been taking 1/2 tablet Hydrochlorothiazide BID PRN. She reports has taken HCTZ when  recently and became dizzy after. States she was advised per [facility] to take HCTZ if /90. [facility] reinforced importance of taking HCTZ for /90- not when lower. States she will do so. Patient reports has been in contact with [employer] for FMLA/STD. States she received phone call from [facility] this week re: provider completing FMLA paperwork. Patient states has been ambulating better- no longer needs walker. She keeps walker nearby for PRN use.  States she has been able to get in and out of the tub at home. She is not driving as she was advised not to drive when discharged from hospital. She plans to talk to PCP about driving privileges. ACM will follow up with patient next week. Addressed changes since last contact:  follow-up appointment-PCP follow up  referrals-patient to discuss referral to in network neurology with PCP at follow up, to request [facility] referral for home therapy, and discuss cardiac monitor order with PCP  Discussed follow-up appointments. If no appointment was previously scheduled, appointment scheduling offered: No.   Is follow up appointment scheduled within 7 days of discharge? No.    Follow Up  No future appointments. Non-[facility] follow up appointment(s):   Cheryl Cain MD PCP - General Family Medicine [id number] [address] [place] [address]      Follow up on 2/1/2023       Cardiac monitor Being mailed to patient Provided by [facility] Transition Nurse reviewed medical action plan with patient and discussed any barriers to care and/or understanding of plan of care after discharge. Discussed appropriate site of care based on symptoms and resources available to patient including: PCP  Specialist  Benefits related nurse triage line. The patient agrees to contact the PCP office for questions related to their healthcare. Advance Care Planning:   Previously addressed .      Patients top risk factors for readmission: functional physical ability, medical condition-recent hospitalization for stroke, multiple health system providers, and stages of grief, needs to get established with in network neurology  Interventions to address risk factors:  Reminded patient to talk to PCP about referral to in network neurology, continue to monitor and log BP at home, discuss therapy with PCP, safety       Care Transitions Subsequent and Final Call    Subsequent and Final Calls  Do you have any ongoing symptoms?: Yes  Onset of Patient-reported symptoms: Other  Patient-reported symptoms: Other  Have your medications changed?: Yes  Patient Reports: Resumed Hydrochlorothiazide 1/2 tablet BID PRN  Do you have any questions related to your medications?: No  Do you currently have any active services?: Yes  Are you currently active with any services?: Other  Do you have any needs or concerns that I can assist you with?: No  Identified Barriers: Stress  Care Transitions Interventions  Other Interventions:            Goals        Conditions and Symptoms      I will schedule office visits, as directed by my provider. I will keep my appointment or reschedule if I have to cancel. I will notify my provider of any barriers to my plan of care. I will notify my provider of any symptoms that indicate a worsening of my condition. Barriers: overwhelmed by complexity of regimen and stress  Plan for overcoming my barriers: I will work with my ACM to overcome barriers. Confidence: 8/10  Anticipated Goal Completion Date: 2/10/2023    1/25/23: ACM called PCP office to schedule hospital follow up- had to leave voicemail message with f/u appt request; office to call patient back directly. ACM discussed red flags, when to call 911, and NAL.   1/30/2023: Patient now has PCP follow up scheduled - 2/1/2023. She will talk to PCP about in network neurology referral.                 Care Transition Nurse provided contact information for future needs. Plan for follow-up call in 7-10 days based on severity of symptoms and risk factors. Plan for next call:  discuss plan of care after PCP OV, cardiac monitor    Emailed the following message to patient after verbal consent obtained ([email or web address]):    Shadi Reese,    According to the [facility] website, these are the in network (tier 1) neurology providers in [facility].  You can go to this website for the full list (I only listed in [facility]) of in network neurology providers in the [facility] area: Julieth    Also, please sign and return the attached email consent. [address] DISORDERS  [address] [facility] [facility] Center[facility] Ngueyn DO Vamshi Lozada MD Garey Jacks, MD Marrion Carol, MD MEGAN Valiant Ill, MD Emmy Given, MD  [address] [facility], [address] [place], [place]   [phone]    Verónica Benson MD  [address] [facility], [address] [place], [place]  [phone]    Yelena Newberry DO  [address] [facility], [address] [place], [place]  [phone]    MD Dillon Nice MD Crist[facility], [facility] [address] [place], [place]  [phone]    Livia Malik DO  [facility] [address] [facility], [address] [place] [address]  [phone]      I know you mentioned not being able to access your [employer] account. You can call the [employer] help desk at [phone] for assistance with your account. I will be in touch next week. Let me know if there's anything I can assist with before then. Cristina Mosqueda, [address]    Akil Pedraza, MAYTE RN  Associate Care Manager  Phone: [phone]  Email: [email or web address]

## 2023-02-07 ENCOUNTER — CARE COORDINATION (OUTPATIENT)
Dept: OTHER | Facility: CLINIC | Age: 63
End: 2023-02-07

## 2023-02-07 NOTE — CARE COORDINATION
[facility] Care Transitions Follow Up Call    Care Transition Nurse contacted the patient by telephone to follow up after admission on 2023. Verified name and  with patient as identifiers. Patient: Kim Leyva  Patient : 1960   MRN: [id number]  Reason for Admission: Cryptogenic stroke  Discharge Date: 2023  RARS: No data recorded    Needs to be reviewed by the provider   Additional needs identified to be addressed with provider: No  none             Method of communication with provider: none. Called patient for CT follow up. She had PCP OV last week. [facility] PCP sent referral to [facility]. She has not yet heard from neurology to schedule initial OV. [facility] she called neurology office last week before PCP sent referral and was told they are scheduling out into May. She states PCP released her to return to driving and to return to work 3/1/23. [facility] PCP faxed completed FMLA paperwork into [employer] after OV. Patient states PCP sent referral to [facility] OP physical therapy. She was told her co-pay is $300+ per PT visit. She is looking into self pay options at private PT clinics. [facility] suggested patient call [facility] to verify co-pay for OP physical therapy- as she has had IP hospitalization in . States she will do so. Has initial PT appt 23 @ [facility] - which she states was told is first available. States she plans to call neurology office early next week if has not received call by then. Patient states had headache on  morning after taking 1/2 tablet HCTZ. States prior to HCTZ BP was 135/85. She states she was hoping to catch BP before BP went up so she took 1/2 HCTZ. Reports BP dropped to 80/40 within a couple hours of HCTZ. She states she drank a lot of water and BP gradually improved. ACM reinforced importance of only taking HCTZ if BP >140/90. She verbalized understanding.  She continues to monitor BP at home; although no longer checking Q hour as she was right after discharge. Patient states received Holter monitor from [facility] and has been wearing 7 days. Will have on for 30 days. States she was provided extra patches with monitor. Next PCP follow up in 3 months. ACM will follow up with patient in 2 weeks. Addressed changes since last contact:  discuss plan of care after PCP OV, cardiac monitor  Discussed follow-up appointments. If no appointment was previously scheduled, appointment scheduling offered: No.   Is follow up appointment scheduled within 7 days of discharge? No.    Follow Up  Future Appointments   Date Time Provider Dirk Frank   2/22/2023 10:15 AM Shaina Jiménez, PT [facility] PT [facility] follow up appointment(s):   Luis Shine MD PCP - [address] [facility] 28[address][birth year]-[address] [place] [address]      Follow up May 2023       Care Transition Nurse reviewed discharge instructions and medical action plan with patient and discussed any barriers to care and/or understanding of plan of care after discharge. Discussed appropriate site of care based on symptoms and resources available to patient including: PCP  Specialist  Benefits related nurse triage line. The patient agrees to contact the PCP office for questions related to their healthcare. Advance Care Planning:   Not addressed this date .      Patients top risk factors for readmission: medical condition-recent cryptogenic stroke, fluctuating BP, multiple health system providers, and stages of grief  Interventions to address risk factors:  Reinforced importance of regular BP monitoring      Care Transitions Subsequent and Final Call    Subsequent and Final Calls  Do you have any ongoing symptoms?: No  Have your medications changed?: No  Do you have any questions related to your medications?: No  Do you currently have any active services?: Yes  Are you currently active with any services?: Outpatient/Community Services  Do you have any needs or concerns that I can assist you with?: No  Identified Barriers: Stress  Care Transitions Interventions  Other Interventions:            Goals        Conditions and Symptoms      I will schedule office visits, as directed by my provider. I will keep my appointment or reschedule if I have to cancel. I will notify my provider of any barriers to my plan of care. I will notify my provider of any symptoms that indicate a worsening of my condition. Barriers: overwhelmed by complexity of regimen and stress  Plan for overcoming my barriers: I will work with my ACM to overcome barriers. Confidence: 8/10  Anticipated Goal Completion Date: 2/10/2023    1/25/23: ACM called PCP office to schedule hospital follow up- had to leave voicemail message with f/u appt request; office to call patient back directly. ACM discussed red flags, when to call 911, and NAL.   1/30/2023: Patient now has PCP follow up scheduled - 2/1/2023. She will talk to PCP about in network neurology referral.   2/7/23: Patient waiting to hear from neurology to schedule initial OV. Care Transition Nurse provided contact information for future needs. Plan for follow-up call in 10-14 days based on severity of symptoms and risk factors. Plan for next call:  neurology follow up, BP, PT    Tati Fitzpatrick MSN RN  Associate Care Manager  Phone: [phone]  Email: [email or web address]

## 2023-02-21 ENCOUNTER — OFFICE VISIT (OUTPATIENT)
Dept: NEUROLOGY | Age: 63
End: 2023-02-21
Payer: COMMERCIAL

## 2023-02-21 VITALS
DIASTOLIC BLOOD PRESSURE: 100 MMHG | WEIGHT: 180 LBS | OXYGEN SATURATION: 99 % | HEIGHT: 64 IN | HEART RATE: 79 BPM | SYSTOLIC BLOOD PRESSURE: 158 MMHG | BODY MASS INDEX: 30.73 KG/M2

## 2023-02-21 DIAGNOSIS — G47.19 EXCESSIVE DAYTIME SLEEPINESS: ICD-10-CM

## 2023-02-21 DIAGNOSIS — Z86.73 HISTORY OF ISCHEMIC VERTEBROBASILAR ARTERY CEREBELLAR STROKE: Primary | ICD-10-CM

## 2023-02-21 DIAGNOSIS — Q21.12 PFO (PATENT FORAMEN OVALE): ICD-10-CM

## 2023-02-21 PROCEDURE — 99205 OFFICE O/P NEW HI 60 MIN: CPT | Performed by: PSYCHIATRY & NEUROLOGY

## 2023-02-21 NOTE — PROGRESS NOTES
2/21/23    Simona Siu  1960    Chief Complaint   Patient presents with    New Patient     CVA 1/20/23       History of Present Illness  Simona is a 62 y.o. female presenting today for evaluation of:      Simona Albright presents in neurologic consultation at our [place] office for her history of stroke.    She states on January 20 she got up and felt dizzy.  She was dizzy and vomiting for the whole day.  She did not go to the hospital.  The next day her mother and son urged her to go to the hospital.  In the hospital she had a CT of her head which demonstrated a large right cerebellar subacute stroke.  She was transferred to [facility] due to worry that the stroke could swell.  It did not require any surgical intervention thankfully.  An MRI of the brain did demonstrate the left cerebellar infarct but a smaller area of infarct in the right cerebellum as well.  An MRA did not reveal any evidence of vertebrobasilar stenosis.  A SRINIVASA was performed which did show a PFO and left atrial enlargement.    She tells me since discharge she has been doing quite well.  She is starting physical therapy tomorrow.  She has not evaluation.  She is unsure if she will require additional therapy.  She tells me her balance has been good.  She denies any issues with reading or using a computer.  She notices some issues with multitasking.  She is wondering if she can go back to work.  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  She does believe that she would be able to do her job.    She has a 30-day heart monitor on at this time.  Upon discharge she was also prescribed aspirin 81 mg and atorvastatin 40 mg for secondary stroke prevention.  There is no prior history of stroke.  There was a history of stroke in her sister when her sister was 49 years old.  Her sister had a PFO which did require surgical intervention.    She does have some hypertension for which she takes hydrochlorothiazide as needed.  Today her blood  pressure is 160/100. She has not taken her hydrochlorothiazide today. She has never smoked. She tells me her sleep is okay but sometimes she has anxiety. She is unsure if she snores. She does feel sleepy throughout the day. She has never had a sleep study. Prior to the stroke she did have some headaches but this is actually improved since the stroke. She was attributed the headaches due to muscle pain in her neck. Subjective    Review of Symptoms:  Neurologic   Symptoms: no difficulty with gait or walking, no bowel symptoms, no vertigo, no confusion, no memory loss, no speech disorder, no visual loss, no double vision, no dizziness, no loss of hearing, no sensory disturbances, no weakness, no headaches, no bladder symptoms, no seizures, no excessive fatigue, and no syncope    Current Outpatient Medications   Medication Sig Dispense Refill    atorvastatin (LIPITOR) 40 MG tablet Take 40 mg by mouth nightly      aspirin 81 MG chewable tablet Take 81 mg by mouth daily      ibuprofen (ADVIL;MOTRIN) 600 MG tablet Take 1 tablet by mouth every 6 hours as needed for Pain 30 tablet 0    acetaminophen (APAP EXTRA STRENGTH) 500 MG tablet Take 1 tablet by mouth every 6 hours as needed for Pain 20 tablet 0    hydroCHLOROthiazide (HYDRODIURIL) 50 MG tablet Take 50 mg by mouth nightly Taking 1/2 tablet BID PRN for BP >140/90      Multiple Vitamins-Minerals (MULTIVITAMIN PO) Take by mouth nightly Over The Counter       No current facility-administered medications for this visit.      Facility-Administered Medications Ordered in Other Visits   Medication Dose Route Frequency Provider Last Rate Last Admin    sodium chloride flush 0.9 % injection 10 mL  10 mL IntraVENous PRN Yaritza York MD   10 mL at 07/18/18 1100       Past Medical History:   Diagnosis Date    Acid reflux     Anemia     Arthritis     \"Hands, Hips\"    Backache     \"Sometimes, I Go To A Chriopractor Once A Month\"    Hiatal hernia     History of blood transfusion Last Infusion 6-18    No Reaction To Blood Transfusions Received    Hypertension     Shortness of breath on exertion     Teeth missing     Upper And Lower    Wears glasses     [place] teeth extracted     4 [place] Teeth Extracted In Past       Past Surgical History:   Procedure Laterality Date     SECTION      CHOLECYSTECTOMY, LAPAROSCOPIC      COLONOSCOPY  Last Done In     DILATION AND CURETTAGE OF UTERUS  \"Between  And \"    ENDOSCOPY, COLON, DIAGNOSTIC  Last Done In     HERNIA REPAIR  10/11/2018    ND LAP ESOPHAGEAL LENGTHENING N/A 10/11/2018    NISSEN FUNDOPLICATION LAPAROSCOPIC ROBOTIC performed by Darcie Cruz MD at [facility] OR    TONSILLECTOMY AND ADENOIDECTOMY  1965    WISDOM TOOTH EXTRACTION      4 [place] Teeth Extracted In Past        Social History     Socioeconomic History    Marital status:    Tobacco Use    Smoking status: Never    Smokeless tobacco: Never   Vaping Use    Vaping Use: Never used   Substance and Sexual Activity    Alcohol use: No    Drug use: No       Family History   Problem Relation Age of Onset    Hypertension Mother     Arthritis Mother     High Blood Pressure Mother     Cancer Father         Lung Cancer    Heart Attack Father     Heart Disease Father         Heart Attack    Cancer Sister         \"Cancer Of The Spine\"    Early Death Sister 47        \"Cancer Of The Spine\"    High Blood Pressure Sister     Stroke Sister     Breast Cancer Neg Hx     Ovarian Cancer Neg Hx        Objective    Physical Exam:    Constitutional   Weight: well nourished  Heart/Vascular   Rate and Rhythm: RRR   Murmurs: none   Arterial Pulses:  no carotid bruits  Neck   Appearance/Palpation/Auscultation: supple  Mental Status   Orientation: oriented to person, oriented to place, oriented to problem, and oriented to time   Mood/Affect: appropriate mood and appropriate affect   Memory/Other: recent memory intact, remote memory intact, fund of knowledge intact,  attention span normal, and concentration normal  Language   Language: (normal) language, no dysarthria, (normal) articulation, and no dysphasia/aphasia  Cranial Nerves   CN II Right: visual fields appear intact   CN II Left: visual fields appear intact   CN III, IV, VI: EOM no nystagmus, normal pursuit, and extraocular muscle strength normal   CN III: pupil normal size, pupil reactive to light and dark, pupil accomodates, and no ptosis   CN IV: normal   CN VI: normal   CN V Right: normal sensation and muscles of mastication intact   CN V Left: normal sensation and muscles of mastication intact   CN VII Right: normal facial expression   CN VII Left: normal facial expression   CN VIII Right: hearing in tact to normal conversation   CN VIII Left: hearing in tact to normal conversation   CN IX,X: normal palatal movement   CN XI Right: normal sternocleidomastoid and normal trapezius   CN XI Left: normal sternocleidomastoid and normal trapezius   CN XII: no tremors of the tongue, no fasciculation of the tongue, tongue protrudes midline, normal power to left, and normal power to right  Gait and Stance   Gait/Posture: station normal, ambulates independently, gait normal, and Romberg's test normal  Motor/Coordination Exam   General: no bradykinesia, no tremors, no chorea, no athetosis, no myoclonus, and no dyskinesia   Right Upper Extremity: normal motor strength, normal bulk, and normal tone   Left Upper Extremity: normal motor strength, normal bulk, and normal tone   Right Lower Extremity: normal motor strength, normal bulk, and normal tone   Left Lower Extremity: normal motor strength, normal bulk, and normal tone   Coordination: no drift, normal finger-to-nose, normal heel-to-shin, and rapid alternating movements normal  Reflexes   Reflexes Right: DTRS are normal throughout   Reflexes Left: DTRS are normal throughout  Sensory   Sensation: normal light touch and no neglect  Spine   Cervical Spine: no tenderness, no dystonia , and full ROM   Thoracic Spine: no spasms, no bony abnormalities, normal curvature, no tenderness, and full ROM   Low Back: full ROM, no pain, no spasms, and no bony abnormalities  Lungs   Auscultation: normal breath sounds  Skin   Inspection: no jaundice, no lesions, no rashes, and no cyanosis      BP (!) 158/100 (Site: Right Upper Arm, Position: Sitting, Cuff Size: Medium Adult)   Pulse 79   Ht 5' 4\" (1.626 m)   Wt 180 lb (81.6 kg)   SpO2 99%   BMI 30.90 kg/m²     Assessment and Plan     Diagnosis Orders   1. History of ischemic vertebrobasilar artery cerebellar stroke  Ambulatory referral to Sleep Medicine    Ambulatory referral to Cardiology      2. PFO (patent foramen ovale)  Ambulatory referral to Cardiology      3. Excessive daytime sleepiness  Ambulatory referral to Sleep Medicine          Mandi aPndya is here to establish care for her recent history of cerebellar stroke. There was no vascular abnormality appreciated on MR angiogram.  A SRINIVASA did demonstrate a PFO with left atrial enlargement. No associated septal atrial aneurysm was described. In her age group typically a PFO is not recommended to be fixed as it does not decrease the risk of additional stroke. Typically, antiplatelet therapy with aspirin 81 mg daily is sufficient for secondary stroke prevention unless the PFO is associated with an atrial septal aneurysm for which anticoagulation with Coumadin is typically recommended. I would like to get an opinion from cardiology regarding the features of the PFO and if she would be at high risk for stroke and if, despite age, they may recommend closure. Additionally, her blood pressure was 160/100 today. She is instructed to take hydrochlorothiazide as needed. It may be worthwhile to also get recommendations regarding treatment of her hypertension. We also discussed risk factors for stroke including obstructive sleep apnea.   She does have excessive daytime sleepiness and some sleep derangement. She is unsure if she snores. Given she does not have many additional risk factors for stroke I will refer her to sleep medicine for a subsequent sleep study to rule out YANET as an untreated risk factor for stroke. She does have a PT evaluation tomorrow. I anticipate that she will not require much additional therapy as she is doing quite well. 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 She may note that she gets more mentally and/or physically fatigued than usual and may require shorter workdays to start out. We will see how she does. Return in about 3 months (around 5/21/2023) for Follow-up me or TE.     Kamari Gary, DO

## 2023-02-22 ENCOUNTER — TELEPHONE (OUTPATIENT)
Dept: NEUROLOGY | Age: 63
End: 2023-02-22

## 2023-02-22 ENCOUNTER — TELEPHONE (OUTPATIENT)
Dept: CARDIOLOGY CLINIC | Age: 63
End: 2023-02-22

## 2023-02-22 ENCOUNTER — CARE COORDINATION (OUTPATIENT)
Dept: OTHER | Facility: CLINIC | Age: 63
End: 2023-02-22

## 2023-02-22 ENCOUNTER — HOSPITAL ENCOUNTER (OUTPATIENT)
Dept: PHYSICAL THERAPY | Age: 63
Setting detail: THERAPIES SERIES
Discharge: HOME | End: 2023-02-22
Payer: COMMERCIAL

## 2023-02-22 PROCEDURE — 97110 THERAPEUTIC EXERCISES: CPT

## 2023-02-22 PROCEDURE — 97161 PT EVAL LOW COMPLEX 20 MIN: CPT

## 2023-02-22 NOTE — CARE COORDINATION
ACM attempted to reach patient for follow up call after neurology OV. HIPAA compliant message left requesting a return phone call at patients convenience. Will continue to follow. MAYTE Martin RN  Associate Care Manager  Phone: [phone]  Email: [email or web address]

## 2023-02-22 NOTE — FLOWSHEET NOTE
Outpatient Physical Therapy  [place]           [x] Phone: [phone]   Fax: [phone]  [place]           [] Phone: [phone]   Fax: [phone]        Physical Therapy Daily Treatment Note  Date:  2023    Patient Name:  Alan Barnhart    :  1960  MRN: [id number]  Restrictions/Precautions: NONE  Diagnosis:   Cerebral infarction, unspecified [I63.9] Diagnosis: cerebral infarction  Date of Injury/Surgery: 2023  Treatment Diagnosis:  Balance deficit  Insurance/Certification information: [facility]  Referring Physician: MD Dr. Nikolas Everett   PCP: Anastacio Ayers MD  Next Doctor Visit:  --  Plan of care signed (Y/N):  N, sent 23  Outcome Measure: ABC: see folder  Visit# / total visits:   1/  Pain level: 0/10   Goals:     Patient goals: return to work full duty  Short term goals  Time Frame for Short term goals: 2 weeks  Pt demo I w/ home and gym exercise program    Summary of Evaluation:  Assessment: Pt is 58year old female who experienced a stroke on 2023. Pt had some minor deficits in balance initially following, but notes improvement. Pt is not deemed a fall risk according to the Wilson balance assessment. Able to complete obstacle course with step overs, lateral and forward stepping, and cone weaving without LOB and independence. No significant deficits in BLE and BUE strength noted and no excessive SOB with activity. Pt will benefit with PT services to provide appropriate exercises progressions and development of a home exercise program. Patient able to complete all testing without LOB or fall risk concern as noted during today's session. Feel patient is appropriate to return to work in full at this time. Subjective:  See eval         Any changes in Ambulatory Summary Sheet?   None        Objective:  See eval           Exercises: (No more than 4 columns)   Exercise/Equipment 23 #1 Date Date           WARM UP                     TABLE *UT stretch      *Levator stretch                           STANDING      *Marches      *hip ABD      *Mini Lunge      *Mini Squats                             PROPRIOCEPTION                                    MODALITIES                      Other Therapeutic Activities/Education:  Patient received education on their current pathology and how their condition effects them with their functional activities. Patient understood discussion and questions were answered. Patient understands their activity limitations and understands rational for treatment progression. Home Exercise Program:  *HO issued, reviewed and discussed with patient. All questions answered. Pt agreed to comply. Manual Treatments:  --      Modalities:  --      Communication with other providers:  eval sent 2/22/23       Assessment:  (Response towards treatment session) (Pain Rating)  Assessment: Pt is 58year old female who experienced a stroke on January 20th, 2023. Pt had some minor deficits in balance initially following, but notes improvement. Pt is not deemed a fall risk according to the Wilson balance assessment. Able to complete obstacle course with step overs, lateral and forward stepping, and cone weaving without LOB and independence. No significant deficits in BLE and BUE strength noted and no excessive SOB with activity. Pt will benefit with PT services to provide appropriate exercises progressions and development of a home exercise program. Patient able to complete all testing without LOB or fall risk concern as noted during today's session. Feel patient is appropriate to return to work in full at this time.       Plan for Next Session: --       Time In / Time Out:   5696-8827        Timed Code/Total Treatment Minutes:  (1) PT javier  (1) TE      Next Progress Note due:  10th visit      Plan of Care Interventions:  [x] Therapeutic Exercise  [] Modalities:  [x] Therapeutic Activity     [] Ultrasound  [] Estim  [x] Gait Training [] Cervical Traction [] Lumbar Traction  [x] Neuromuscular Re-education    [] Cold/hotpack [] Iontophoresis   [x] Instruction in HEP      [] Vasopneumatic   [] Dry Needling    [] Manual Therapy               [] Aquatic Therapy              Electronically signed by:  Lyndon Cardona PT, 2/22/2023, 11:00 AM

## 2023-02-22 NOTE — TELEPHONE ENCOUNTER
Pt was in for ov 2/21/23 and had requested paperwork be completed following CVA. After reviewing form discussed with Delilah and LEEANNE informing pt that paperwork should be completed per PCP due to other specialties being involved but ok to provide ov note. Paperwork up front with ov note attached for pt .

## 2023-02-22 NOTE — PLAN OF CARE
Outpatient Physical Therapy           [place]           [] Phone: [phone]   Fax: [phone]  [facility]           [] Phone: [phone]   Fax: [phone]     To: MD Dr. Gage Reddy   From: Beatriz Mosquera, PT, PT     Patient: Thompson Gracia       : 1960  Diagnosis: Cerebral infarction, unspecified [I63.9] Diagnosis: cerebral infarction  Treatment Diagnosis: Balance deficit  Date: 2023    Physical Therapy Certification/Re-Certification Form  Dear Dr. Gage Robertson,  The following patient has been evaluated for physical therapy services and for therapy to continue, insurance requires physician review of the treatment plan initially and every 90 days. Please review the attached evaluation and/or summary of the patient's plan of care, and verify that you agree therapy should continue by signing the attached document and sending it back to our office. Assessment:    Assessment: Pt is 58year old female who experienced a stroke on 2023. Pt had some minor deficits in balance initially following, but notes improvement. Pt is not deemed a fall risk according to the Wilson balance assessment. Able to complete obstacle course with step overs, lateral and forward stepping, and cone weaving without LOB and independence. No significant deficits in BLE and BUE strength noted and no excessive SOB with activity. Pt will benefit with PT services to provide appropriate exercises progressions and development of a home exercise program. Patient able to complete all testing without LOB or fall risk concern as noted during today's session. Feel patient is appropriate to return to work in full at this time.     Plan of Care/Treatment to date:  [x] Therapeutic Exercise  [] Modalities:  [x] Therapeutic Activity     [] Ultrasound  [] Electrical Stimulation  [x] Gait Training      [] Cervical Traction [] Lumbar Traction  [x] Neuromuscular Re-education    [] Cold/hotpack [] Iontophoresis   [x] Instruction in HEP      [] Vasopneumatic    [] Dry Needling  [] Manual Therapy               [] Aquatic Therapy       Other:          Frequency/Duration:  # Days per week: [x] 1 day # Weeks: [] 1 week [] 5 weeks     [] 2 days   [x] 2 weeks [] 6 weeks     [] 3 days   [] 3 weeks [] 7 weeks     [] 4 days   [] 4 weeks [] 8 weeks         [] 9 weeks [] 10 weeks         [] 11 weeks [] 12 weeks    Rehab Potential/Progress: [] Excellent [x] Good [] Fair  [] Poor     Goals:    Patient goals: return to work full duty  Short term goals  Time Frame for Short term goals: 2 weeks  Pt demo I w/ home and gym exercise program    Electronically signed by:  Adan Faria PT, DPT, 2/22/2023, 11:00 AM        If you have any questions or concerns, please don't hesitate to call.   Thank you for your referral.      Physician Signature:________________________________Date:_________ TIME: _____  By signing above, therapists plan is approved by physician

## 2023-02-22 NOTE — PROGRESS NOTES
Physical Therapy: Initial Evaluation    Patient: Sandra Saez (74 y.o. female)   Examination Date:   Plan of Care Certification Period: 2023 to        :  1960 ;    Confirmed: Yes MRN: [id number]  CSN: [id number]   Insurance: Payor: Jessi Vilchisnasirhelen [address] 150 / Plan: [id number] / Product Type: *No Product type* /   Insurance ID: [id number] - ([id number]) Secondary Insurance (if applicable):    Referring Physician:  MD Dr. Stefania Johnson   PCP: Jerri Gaines MD Visits to Date/Visits Approved:   /      No Show/Cancelled Appts:   /       Medical Diagnosis: Cerebral infarction, unspecified [I63.9] cerebral infarction  Treatment Diagnosis: Balance deficit     PERTINENT MEDICAL HISTORY   Patient Assessed for Rehabilitation Services: Yes  Self reported health status[de-identified] Good    Medical History: Chart Reviewed: Yes   Past Medical History:   Diagnosis Date    Acid reflux     Anemia     Arthritis     \"Hands, Hips\"    Backache     \"Sometimes, I Go To A Chriopractor Once A Month\"    Hiatal hernia     History of blood transfusion Last Infusion 6-18    No Reaction To Blood Transfusions Received    Hypertension     Shortness of breath on exertion     Teeth missing     Upper And Lower    Wears glasses     [place] teeth extracted     4 [place] Teeth Extracted In Past     Surgical History:   Past Surgical History:   Procedure Laterality Date     SECTION      CHOLECYSTECTOMY, LAPAROSCOPIC      COLONOSCOPY  Last Done In     [address] OF UTERUS  \"Between  And \"    ENDOSCOPY, COLON, DIAGNOSTIC  Last Done In     HERNIA REPAIR  10/11/2018    AL LAPS ESOPHAGEAL LENGTHENING ADDL N/A 10/11/2018    NISSEN FUNDOPLICATION LAPAROSCOPIC ROBOTIC performed by Jason Padilla MD at [address] EXTRACTION      4 [place] Teeth Extracted In Past       Medications:   Current Outpatient Medications:     atorvastatin (LIPITOR) 40 MG tablet, Take 40 mg by mouth nightly, Disp: , Rfl:     aspirin 81 MG chewable tablet, Take 81 mg by mouth daily, Disp: , Rfl:     ibuprofen (ADVIL;MOTRIN) 600 MG tablet, Take 1 tablet by mouth every 6 hours as needed for Pain, Disp: 30 tablet, Rfl: 0    acetaminophen (APAP EXTRA STRENGTH) 500 MG tablet, Take 1 tablet by mouth every 6 hours as needed for Pain, Disp: 20 tablet, Rfl: 0    hydroCHLOROthiazide (HYDRODIURIL) 50 MG tablet, Take 50 mg by mouth nightly Taking 1/2 tablet BID PRN for BP >140/90, Disp: , Rfl:     Multiple Vitamins-Minerals (MULTIVITAMIN PO), Take by mouth nightly Over The Counter, Disp: , Rfl:   No current facility-administered medications for this encounter. Facility-Administered Medications Ordered in Other Encounters:     sodium chloride flush 0.9 % injection 10 mL, 10 mL, IntraVENous, PRN, Pooja Dan MD, 10 mL at 07/18/18 1100  Allergies: Codeine and Ultram [tramadol]      SUBJECTIVE EXAMINATION     History obtained from[de-identified] Chart Review, Patient,      Family/Caregiver Present: No    Subjective History:    Subjective: She states on January 20 she got up and felt dizzy. She was dizzy and vomiting for the whole day. She did not go to the hospital.  The next day her mother and son urged her to go to the hospital.  In the hospital she had a CT of her head which demonstrated a large right cerebellar subacute stroke. She was transferred to [facility] due to worry that the stroke could swell. It did not require any surgical intervention thankfully. An MRI of the brain did demonstrate the left cerebellar infarct but a smaller area of infarct in the right cerebellum as well. She was told she needed PT for her balance. She doesn't feel like she needs the balance therapy, but wants to check it. She has been doing a lot of walking. Additional Pertinent Hx (if applicable):      Any changes to allergies, medications, or have you had any medical procedures/ER visits since your last visit?: No      Learning/Language: Learning  Does the patient/guardian have any barriers to learning?: No barriers  Will there be a co-learner?: No  What is the preferred language of the patient/guardian?: English  Is an  required?: No  How does the patient/guardian prefer to learn new concepts?: Listening, Reading, Demonstration     Pain Screening   Pain Screening  Patient Currently in Pain: No    Functional Status    Dominant Hand: : Right    Social History:  Social History  Lives With: Alone  Type of Home: House  Home Layout: One level  Home Access: Stairs to enter with rails  Entrance Stairs - Number of Steps: 3 steps    Occupation/Interests:  Occupation: Full time employment  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    Prior Level of Function:  independent Independent     Current Level of Function:  independent      ADL Assistance: Independent  Homemaking Assistance: Independent  Ambulation Assistance: Independent  Transfer Assistance: Independent  Active : Yes  Mode of Transportation: Car    OBJECTIVE EXAMINATION   Restrictions:  NONE    Review of Systems:  Vision: Within Functional Limits  Hearing: Within functional limits  Follows Commands: Within Functional Limits    Observations:  General Observations  Description: No gait deviations noted    Balance Screen:  Balance  Posture: Good  Sitting - Static: Good  Sitting - Dynamic: Good  Standing - Static: Good  Standing - Dynamic: Good  Tandem Stance R Le seconds level surface and foam  Tandem Stance L Le seconds level surface and foam  Single Stance R Leg: 15 seconds  Single Stance L Le seconds  Safety Awareness  Safety awareness: Good  Foam Surface  Eyes Open: 30 seconds  Sway: None  Eyes Closed: 30 seconds  Sway: None  Romberg/Narrow Base of Support  Eyes Open: 30 seconds  Sway: None  Eyes Closed: 30 seconds  Sway: None  Sharpened Romberg/Tandem  Eyes Open: 30 seconds  Sway: None  Eyes Closed: 15 seconds  Sway: Minimal    Left AROM  Right AROM         AROM LUE (degrees)  LUE AROM : WNL  AROM LLE (degrees)  LLE AROM : WNL    AROM RUE (degrees)  RUE AROM : WNL  AROM RLE (degrees)  RLE AROM: WNL     Left PROM  Right PROM                    Left Strength  Right Strength      Strength Other  Other: 30 sec STS: 22 times without UE assist  Other: 30 sec STS: 22 times without UE assist  Strength LLE  Strength LLE: WFL Strength Other  Other: 30 sec STS: 22 times without UE assist  Other: 30 sec STS: 22 times without UE assist  Strength RLE  Strength RLE: WFL     Special Tests:   Special Tests: Stairs: able to ascend/descend 1 flight of stairs with Rubén from x1 handrail and reciprocal pattern    Additional Finding(s) (if applicable): Other: DGI: 24/24       ASSESSMENT     Impression: Assessment: Pt is 58year old female who experienced a stroke on January 20th, 2023. Pt had some minor deficits in balance initially following, but notes improvement. Pt is not deemed a fall risk according to the Wilson balance assessment. Able to complete obstacle course with step overs, lateral and forward stepping, and cone weaving without LOB and independence. No significant deficits in BLE and BUE strength noted and no excessive SOB with activity. Pt will benefit with PT services to provide appropriate exercises progressions and development of a home exercise program. Patient able to complete all testing without LOB or fall risk concern as noted during today's session. Feel patient is appropriate to return to work in full at this time. Body Structures, Functions, Activity Limitations Requiring Skilled Therapeutic Intervention: Decreased ADL status, Decreased endurance, Decreased balance, Decreased tolerance to work activity    Statement of Medical Necessity: Physical Therapy is both indicated and medically necessary as outlined in the POC to increase the likelihood of meeting the functionally related goals stated below.      Patient's Activity Tolerance: Patient tolerated evaluation without incident      Patient's rehabilitation potential/prognosis is considered to be: Good    Factors which may impact rehabilitation potential include: None        GOALS   Patient Goal(s): return to work full duty  Short Term Goals Completed by 2 weeks Goal Status   Pt demo I w/ home and gym exercise program New                                                                TREATMENT PLAN       Requires PT Follow-Up: Yes    Pt. actively involved in establishing Plan of Care and Goals: Yes  Patient/ Caregiver education and instruction: Goals, PT Role, Plan of Care, Evaluative findings, Home Exercise Program, Body mechanics discussed transition to gym vs. home program           Treatment may include any combination of the following: Current Treatment Recommendations: Strengthening, ROM, Balance training, Home exercise program, Therapeutic activities, Gait training, Neuromuscular re-education, Endurance training, Return to work related activity     Frequency / Duration:  Patient to be seen 1x for 2 weeks weeks      Eval Complexity:    Decision Making: Low Complexity    Therapist Signature: Adan Faria PT    Date: 5/23/8203     I certify that the above Therapy Services are being furnished while the patient is under my care. I agree with the treatment plan and certify that this therapy is necessary. Physician's Signature:  ___________________________   Date:_______                                                                   Aby Sanchez MD        Physician Comments: _______________________________________________    Please sign and return to   [facility]. Please fax to the location listed below.  Vijaya Small for this referral!    [address] [place] [address] [address] [id number]  Dept: [phone]  Dept Fax: [phone]  Loc: [phone]

## 2023-02-24 ENCOUNTER — TELEPHONE (OUTPATIENT)
Dept: CARDIOLOGY CLINIC | Age: 63
End: 2023-02-24

## 2023-03-05 ENCOUNTER — CARE COORDINATION (OUTPATIENT)
Dept: OTHER | Facility: CLINIC | Age: 63
End: 2023-03-05

## 2023-03-05 NOTE — CARE COORDINATION
ACM attempted to reach patient for follow up call after neurology OV. HIPAA compliant message left requesting a return phone call at patients convenience. Lost to f/u letter sent via [address] Will continue to follow. MAYTE Mcgill RN  Associate Care Manager  Phone: [phone]  Email: [email or web address]

## 2023-03-15 ENCOUNTER — INITIAL CONSULT (OUTPATIENT)
Dept: CARDIOLOGY CLINIC | Age: 63
End: 2023-03-15
Payer: COMMERCIAL

## 2023-03-15 VITALS
HEART RATE: 86 BPM | WEIGHT: 179.8 LBS | SYSTOLIC BLOOD PRESSURE: 126 MMHG | HEIGHT: 64 IN | BODY MASS INDEX: 30.7 KG/M2 | DIASTOLIC BLOOD PRESSURE: 72 MMHG

## 2023-03-15 DIAGNOSIS — Q21.12 PFO (PATENT FORAMEN OVALE): ICD-10-CM

## 2023-03-15 DIAGNOSIS — I10 PRIMARY HYPERTENSION: ICD-10-CM

## 2023-03-15 DIAGNOSIS — Z86.73 HISTORY OF CVA (CEREBROVASCULAR ACCIDENT): ICD-10-CM

## 2023-03-15 DIAGNOSIS — E78.2 MIXED HYPERLIPIDEMIA: ICD-10-CM

## 2023-03-15 PROCEDURE — 3078F DIAST BP <80 MM HG: CPT | Performed by: INTERNAL MEDICINE

## 2023-03-15 PROCEDURE — 3074F SYST BP LT 130 MM HG: CPT | Performed by: INTERNAL MEDICINE

## 2023-03-15 PROCEDURE — 99204 OFFICE O/P NEW MOD 45 MIN: CPT | Performed by: INTERNAL MEDICINE

## 2023-03-15 RX ORDER — HYDROCHLOROTHIAZIDE 25 MG/1
12.5 TABLET ORAL NIGHTLY
Qty: 30 TABLET | Status: SHIPPED | COMMUNITY
Start: 2023-03-15

## 2023-03-15 NOTE — ASSESSMENT & PLAN NOTE
Blood pressure is well controlled. She reports she developed hypotension with systolic blood pressure of 80 mmHg and she felt terrible after she took 25 mg once and now she is splitting the dose to 12.5 mg twice a day. I have recommended to decrease the dose to 12.5 mg daily and continue to monitor blood pressure regularly. Goal is to keep it below 130/85 most of the time. She verbalized understanding.

## 2023-03-15 NOTE — PATIENT INSTRUCTIONS
Decrease Hydrochlorothiazide to 12.5 mg daily. Start monitoring BP and HR daily and write it down and bring the readings along with the BP monitor for office visit. Continue other current cardiovascular medications which have been reviewed and discussed individually with you. Check lipids. Will discuss with Dr Ion Guadalupe about PFO. Secondary prevention is the goal by aggressive risk modification, healthy and therapeutic life style changes for cardiovascular risk reduction. Various goals are discussed and questions answered. Multiple questions answered. Patient verbalizes understanding and asks relevant questions. Follow up in 6 weeks, sooner if needed.

## 2023-03-15 NOTE — ASSESSMENT & PLAN NOTE
Patient did not have intra-atrial septal aneurysm or evidence of blood clot. Being managed with aspirin. I will discuss this with Dr. Savannah Negron for further recommendations for any indication for PFO closure at this time given her findings or need of any further work-up.

## 2023-03-15 NOTE — ASSESSMENT & PLAN NOTE
Patient started on start atorvastatin after stroke and we will repeat lipids to assess  LDL status. LDL goal to be less than 70.

## 2023-03-15 NOTE — PROGRESS NOTES
CARDIOLOGY CONSULTATION    Thompson Gracia  1960    Dear Dr. Bereket Pennington MD    Thank you for asking me to participate in care of Thompson Gracia    Chief Complaint   Patient presents with    Consultation     Patient sent here per referral from Dr. Swati Cosby. Patient denies Chest Pains, Palpitations, SOB, Dizziness and Edema. Patient does not drink alcohol or smoke. Pt states she cut back on caffeine and does not drink as much as now. Patient states she had a stroke in January 2023. Patient went to [facility] ED and then was transferred to [facility]- everything in care every where. History of present illness:  Thompson Gracia is a 58 y.o. female is seeing me for the first time for further evaluation and recommendations for PFO noted on SRINIVASA at [facility] after she had a stroke resulting in cerebellar infarct. Patient has presented with nausea vomiting and dizziness and had not had any headache. She has hypertension for the last 3 to 4 years which has been managed by PCP and she is recently started on lipid-lowering therapy for hyperlipidemia. She has a sister who also had a PFO and had a stroke and had PFO closure done by Dr. Malcolm Langford. Reviewed [facility] records and neurology follow-up locally with Dr. Swati Cosby. There was no intra-atrial septal aneurysm or blood clot on SRINIVASA at [facility] and she is treated with aspirin. She is compliant to her medications and has never been a smoker. 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 REVIEW OF SYSTEMS:   Constitutional: Denies generalized weakness  Eyes:  Denies change in visual acuity  HENT:  Denies nasal congestion or sore throat  Respiratory:  Denies cough or shortness of breath  Cardiovascular: as in HPI  GI:  Denies abdominal pain, complains of nausea and vomiting  :  Denies dysuria  Musculoskeletal:  Denies back pain or joint pain  Integument:  Denies rash  Neurologic:  Denies headache, focal weakness or sensory changes  \"Remaining review of systems reviewed and negative.      Past medical history:  has a past medical history of Acid reflux, Anemia, Arthritis, Backache, Hiatal hernia, History of blood transfusion, History of CVA (cerebrovascular accident), Hypertension, Mixed hyperlipidemia, Primary hypertension, Shortness of breath on exertion, Teeth missing, Wears glasses, and [place] teeth extracted.  Past surgical history:  has a past surgical history that includes  section (); Colonoscopy (Last Done In ); Endoscopy, colon, diagnostic (Last Done In ); [place] tooth extraction; Dilation and curettage of uterus (\"Between  And \"); Tonsillectomy and adenoidectomy (); Cholecystectomy, laparoscopic (); pr laps esophageal lengthening addl (N/A, 10/11/2018); and hernia repair (10/11/2018).  Social History:   Social History     Tobacco Use    Smoking status: Never    Smokeless tobacco: Never   Substance Use Topics    Alcohol use: No     Family history: family history includes Arthritis in her mother; Cancer in her father and sister; Early Death (age of onset: 47) in her sister; Heart Attack in her father; Heart Disease in her father; High Blood Pressure in her mother and sister; Hypertension in her mother; Stroke in her sister.  Allergies   Allergen Reactions    Codeine      \"Chest Pain\"    Ultram [Tramadol] Nausea Only     Prior to Admission medications    Medication Sig Start Date End Date Taking? Authorizing Provider   hydroCHLOROthiazide (HYDRODIURIL) 25 MG tablet Take 0.5 tablets by mouth nightly Taking 1/2 tablet BID PRN for BP >140/90 3/15/23  Yes Niranjan Cook MD   atorvastatin (LIPITOR) 40 MG tablet Take 40 mg by mouth nightly   Yes Historical Provider, MD   aspirin 81 MG chewable tablet Take 81 mg by mouth daily   Yes Historical Provider, MD   ibuprofen (ADVIL;MOTRIN) 600 MG tablet Take 1 tablet by mouth every 6 hours as needed for Pain 19  Yes Radha Charles PA-C   acetaminophen (APAP EXTRA STRENGTH) 500 MG tablet Take 1 tablet by mouth every  6 hours as needed for Pain 9/13/19  Yes Sarika Issa PA-C   Multiple Vitamins-Minerals (MULTIVITAMIN PO) Take by mouth nightly Over The Counter   Yes Historical Provider, MD     Physical Examination:    Vitals:    03/15/23 0841   BP: 126/72   Site: Left Upper Arm   Position: Sitting   Cuff Size: Medium Adult   Pulse: 86   Weight: 179 lb 12.8 oz (81.6 kg)   Height: 5' 4\" (1.626 m)      Body mass index is 30.86 kg/m². Wt Readings from Last 3 Encounters:   03/15/23 179 lb 12.8 oz (81.6 kg)   02/21/23 180 lb (81.6 kg)   10/30/19 195 lb (88.5 kg)     Constitutional: Pleasant female mildly overweight in no apparent distress  Eyes: She is wearing glasses. Pupils are equal no conjunctival pallor noted  ENT: Unremarkable  NECK: No JVP or thyromegaly  Cardiovascular: Auscultation: Normal S1 and S2. No murmurs are audible and no gallops noted. Carotids are negative for bruits. Abdominal aorta is nonpalpable. No epigastric bruit noted. Peripheral pulses: Pedal pulses are 1-2+ equal in both feet  Respiratory:  Respiratory effort is normal.  Breath sounds are clear to auscultation  Extremities:   No edema, clubbing,  Cyanosis, petechiae. SKIN: Warm and well perfused, no pallor or cyanosis  Abdomen:  No masses or tenderness. No organomegaly noted. Neurologic:  Oriented to time, place, and person and non-anxious. No focal neurological deficit noted. Psychiatric: Normal mood and effect.      LAB REVIEW:  CBC:   Lab Results   Component Value Date/Time    WBC 15.6 01/21/2023 12:43 PM    HGB 12.0 01/21/2023 12:43 PM    HCT 36.0 01/21/2023 12:43 PM     01/21/2023 12:43 PM     Lipids:   Lab Results   Component Value Date    CHOL 213 (H) 10/03/2022    TRIG 79 10/03/2022    HDL 63 10/03/2022    LDLCALC 134 (H) 10/03/2022     Renal:   Lab Results   Component Value Date/Time    BUN 25 01/21/2023 12:43 PM    CREATININE 0.6 01/21/2023 12:43 PM     01/21/2023 12:43 PM    K 3.2 01/21/2023 12:43 PM     PT/INR:   Lab Results   Component Value Date/Time    INR 0.99 01/21/2023 12:43 PM     No results found for: LABA1C    The 10-year ASCVD risk score (Liz DK, et al., 2019) is: 3.8%    Values used to calculate the score:      Age: 58 years      Sex: Female      Is Non- : No      Diabetic: No      Tobacco smoker: No      Systolic Blood Pressure: 363 mmHg      Is BP treated: No      HDL Cholesterol: 63 MG/DL      Total Cholesterol: 213 MG/DL    EKG: EKG on January 21, 2023 showed normal sinus rhythm 69 bpm essentially normal EKG. MRI brain at [facility] with and without contrast on 1/22/2023 reported  Large area of restricted diffusion in keeping with acute infarcts involving   the inferior aspect of left cerebellum and additional smaller infarcts   involving more superior left cerebellum and right cerebellum (series 13 image   47). There is no evidence for hemorrhagic transformation. There is associated   regional mass effect with moderate effacement of foramen Luschka on left and   fourth ventricle. No evidence for worsening hydrocephalus. MRI angio neck with and without contrast at [facility] on 1/22/2023 reported  There is conventional origin of the great vessels from the aortic arch, and no   stenosis at the origins. Carotid Arteries:   Carotid arteries are normal in caliber with no evidence of dissection. Carotid   bifurcations show no significant stenosis. Vertebral Arteries:   No significant stenosis or evidence of dissection. Left vertebral artery is   dominant     SRINIVASA at [facility] on 1/24/2023 reported   Normal LV size and systolic function, EF 72-12%. Normal RV size and systolic function. Severe left atrial enlargement. No RA, LA, or ANGEL thrombus. No significant valvular dysfunction. Septum is hypermobile, PFO is present with moderate sized right-to-left shunt seen on agitated saline study     CT head without contrast at [facility] on 1/21/2023. Reported  1.  Abnormality involving the inferior left cerebellar hemisphere suggestive   of subacute infarction in the distribution the left anterior and/or posterior   inferior cerebellar arteries. However, other processes including   primary/secondary malignancy or abscess with adjacent vasogenic edema could   result in a similar appearance. The edema results in localized mass effect   extending into the vermis with no cerebellar tonsillar herniation. 2. Age-indeterminate lacunar infarct in the right cerebral hemisphere at the   same level. 3. Additional chronic findings as above     1. No acute cardiopulmonary process. 2. At least moderate hiatal hernia. CTA head and neck with contrast on 1/21/2023 at [facility] reported  Large acute to subacute left cerebellar infarct. MRI of the brain is   suggested for further evaluation. No intracranial large vessel occlusion or aneurysm. Unremarkable CT angiogram neck. Assessment / Recommendations:    PFO (patent foramen ovale)  Patient did not have intra-atrial septal aneurysm or evidence of blood clot. Being managed with aspirin. I will discuss this with Dr. Beatriz Wylie for further recommendations for any indication for PFO closure at this time given her findings or need of any further work-up. History of CVA (cerebrovascular accident)  Mechanism and etiology of stroke is not clear at this point. 30-day event monitor results are pending from [address] [place] [address]. Working diagnosis is cryptogenic stroke stroke. If 30-day event monitor is negative I may consider loop implantation and recording for long-term monitoring. Mixed hyperlipidemia  Patient started on start atorvastatin after stroke and we will repeat lipids to assess  LDL status. LDL goal to be less than 70. Primary hypertension   Blood pressure is well controlled.   She reports she developed hypotension with systolic blood pressure of 80 mmHg and she felt terrible after she took 25 mg once and now she is splitting the dose to 12.5 mg twice a day. I have recommended to decrease the dose to 12.5 mg daily and continue to monitor blood pressure regularly. Goal is to keep it below 130/85 most of the time. She verbalized understanding. Decrease Hydrochlorothiazide to 12.5 mg daily. Start monitoring BP and HR daily and write it down and bring the readings along with the BP monitor for office visit. Continue other current cardiovascular medications which have been reviewed and discussed individually with you. Check lipids. Will discuss with Dr Jorge Servin about PFO. Secondary prevention is the goal by aggressive risk modification, healthy and therapeutic life style changes for cardiovascular risk reduction. Various goals are discussed and questions answered. Multiple questions answered. Patient verbalizes understanding and asks relevant questions. Follow up in 6 weeks, sooner if needed. Shiva Rodriguez MD, 3/15/2023 10:41 AM     Please note this report has been produced using speech recognition software and may contain errors related to that system including errors in grammar, punctuation, and spelling, as well as words and phrases that may be inappropriate.  If there are any questions or concerns please feel free to contact the dictating provider for clarification

## 2023-03-15 NOTE — ASSESSMENT & PLAN NOTE
Mechanism and etiology of stroke is not clear at this point. 30-day event monitor results are pending from [address] [place] [address]. Working diagnosis is cryptogenic stroke stroke. If 30-day event monitor is negative I may consider loop implantation and recording for long-term monitoring.

## 2023-03-20 ENCOUNTER — CARE COORDINATION (OUTPATIENT)
Dept: OTHER | Facility: CLINIC | Age: 63
End: 2023-03-20

## 2023-03-21 NOTE — CARE COORDINATION
Ambulatory Care Coordination Note  3/20/2023    Patient Current Location: [facility]     ACM contacted the patient by telephone. Verified name and  with patient as identifiers. Provided introduction to self, and explanation of the ACM role. Challenges to be reviewed by the provider   Additional needs identified to be addressed with provider: No  none               Method of communication with provider: none. ACM: Lulu Olivares RN    ACM called patient for CT follow up. Transitioned patient to  Complex Care this date. Patient reports fatigue but feeling good. States returned to work on 3/3 and going well. She had OV with Dr. Christina Block (neurology) and was referred to cardiology due to PFO. She had OV with Dr. Ponce Spencer last week and now being referred to Dr. Sarbjit Pathak- initial OV 4/10. Patient will have labs done for Dr. Ponce Spencer on  this week. She is aware to fast. States she has not yet decreased HCTZ to daily- still taking 12.5 mg BID. ACM reinforced Dr. Anuj Mendez order to decrease to QD. States she is monitoring BP and has been fine. States BP was elevated at Dr. Ed Torres but she believes that was related to her being nervous. Patient reports being more active lately and increased appetite. States prior to CVA she was keeping her grand kids weekly on . This Thursday will be the first time she does that again since CVA. ACM will follow up with patient after 4/10 to discuss plan of care after she sees Dr. Sarbjit Pathak. Future Appointments   Date Time Provider Dirk Frank   3/23/2023 11:30 AM Noah Rivera [facility]   4/10/2023 10:30 AM Travon Martinez MD [facility]   5/3/2023 10:00 AM Francis Gómez MD [facility]   2023 11:15 AM AMANDA Mercado -  [facility] Neurology -     Plan:  -Patient to have OP fasting labs done this week. -ACM to follow up with patient after 4/10.      MAYTE Abarca RN  Associate Care Manager  Phone: [phone]  Email:

## 2023-03-22 ENCOUNTER — HOSPITAL ENCOUNTER (OUTPATIENT)
Age: 63
Discharge: HOME | End: 2023-03-22
Payer: COMMERCIAL

## 2023-03-22 LAB
ALBUMIN SERPL-MCNC: 4.5 GM/DL (ref 3.4–5)
ALP BLD-CCNC: 84 IU/L (ref 40–129)
ALT SERPL-CCNC: 31 U/L (ref 10–40)
AST SERPL-CCNC: 25 IU/L (ref 15–37)
BILIRUB SERPL-MCNC: 0.4 MG/DL (ref 0–1)
BILIRUBIN DIRECT: 0.2 MG/DL (ref 0–0.3)
BILIRUBIN, INDIRECT: 0.2 MG/DL (ref 0–0.7)
TOTAL PROTEIN: 6.9 GM/DL (ref 6.4–8.2)

## 2023-03-22 PROCEDURE — 36415 COLL VENOUS BLD VENIPUNCTURE: CPT

## 2023-03-22 PROCEDURE — 80076 HEPATIC FUNCTION PANEL: CPT

## 2023-03-23 ENCOUNTER — HOSPITAL ENCOUNTER (OUTPATIENT)
Dept: SLEEP CENTER | Age: 63
Discharge: HOME | End: 2023-03-23
Payer: COMMERCIAL

## 2023-03-23 VITALS
SYSTOLIC BLOOD PRESSURE: 148 MMHG | HEART RATE: 72 BPM | OXYGEN SATURATION: 98 % | DIASTOLIC BLOOD PRESSURE: 88 MMHG | BODY MASS INDEX: 30.56 KG/M2 | WEIGHT: 179 LBS | HEIGHT: 64 IN

## 2023-03-23 DIAGNOSIS — G47.10 HYPERSOMNIA: ICD-10-CM

## 2023-03-23 DIAGNOSIS — E66.9 OBESITY (BMI 30-39.9): ICD-10-CM

## 2023-03-23 DIAGNOSIS — G47.33 OSA (OBSTRUCTIVE SLEEP APNEA): ICD-10-CM

## 2023-03-23 PROCEDURE — 99211 OFF/OP EST MAY X REQ PHY/QHP: CPT

## 2023-03-23 PROCEDURE — 99204 OFFICE O/P NEW MOD 45 MIN: CPT | Performed by: INTERNAL MEDICINE

## 2023-03-23 ASSESSMENT — SLEEP AND FATIGUE QUESTIONNAIRES
HOW LIKELY ARE YOU TO NOD OFF OR FALL ASLEEP WHILE WATCHING TV: 3
HOW LIKELY ARE YOU TO NOD OFF OR FALL ASLEEP IN A CAR, WHILE STOPPED FOR A FEW MINUTES IN TRAFFIC: 0
NECK CIRCUMFERENCE (INCHES): 14.25
ESS TOTAL SCORE: 17
HOW LIKELY ARE YOU TO NOD OFF OR FALL ASLEEP WHILE LYING DOWN TO REST IN THE AFTERNOON WHEN CIRCUMSTANCES PERMIT: 3
HOW LIKELY ARE YOU TO NOD OFF OR FALL ASLEEP WHILE SITTING QUIETLY AFTER LUNCH WITHOUT ALCOHOL: 3
HOW LIKELY ARE YOU TO NOD OFF OR FALL ASLEEP WHILE SITTING INACTIVE IN A PUBLIC PLACE: 2
HOW LIKELY ARE YOU TO NOD OFF OR FALL ASLEEP WHEN YOU ARE A PASSENGER IN A CAR FOR AN HOUR WITHOUT A BREAK: 3
HOW LIKELY ARE YOU TO NOD OFF OR FALL ASLEEP WHILE SITTING AND TALKING TO SOMEONE: 0
HOW LIKELY ARE YOU TO NOD OFF OR FALL ASLEEP WHILE SITTING AND READING: 3

## 2023-03-23 NOTE — CONSULTS
laparoscopic Nissen fundoplication    Urinary tract infection with hematuria    Gross hematuria    Iron deficiency anemia    Acute midline thoracic back pain    Nondisplaced fracture of fifth metatarsal bone, right foot, initial encounter for closed fracture    History of CVA (cerebrovascular accident)    Primary hypertension    Mixed hyperlipidemia    PFO (patent foramen ovale)    YANET (obstructive sleep apnea)    Hypersomnia    Obesity (BMI 30-39. 9)       Past Medical History:   Diagnosis Date    Acid reflux     Anemia     Arthritis     \"Hands, Hips\"    Backache     \"Sometimes, I Go To A Chriopractor Once A Month\"    Hiatal hernia     History of blood transfusion Last Infusion     No Reaction To Blood Transfusions Received    History of CVA (cerebrovascular accident) 3/15/2023    2023 Cerebellar stroke    Hypertension     Mixed hyperlipidemia 3/15/2023    On Atorvastatin 40 mg daily.     Primary hypertension 3/15/2023    Shortness of breath on exertion     Teeth missing     Upper And Lower    Wears glasses     [place] teeth extracted     4 [place] Teeth Extracted In Past       Past Surgical History:   Procedure Laterality Date     SECTION      CHOLECYSTECTOMY, LAPAROSCOPIC      COLONOSCOPY  Last Done In     DILATION AND CURETTAGE OF UTERUS  \"Between  And \"    ENDOSCOPY, COLON, DIAGNOSTIC  Last Done In     HERNIA REPAIR  10/11/2018    [place] LAPS ESOPHAGEAL LENGTHENING ADDL N/A 10/11/2018    NISSEN FUNDOPLICATION LAPAROSCOPIC ROBOTIC performed by Blayne Anaya MD at [address] [facility] [address] EXTRACTION      4 [place] Teeth Extracted In Past       Family History   Problem Relation Age of Onset    Hypertension Mother     Arthritis Mother     High Blood Pressure Mother     Cancer Father         Lung Cancer    Heart Attack Father     Heart Disease Father         Heart Attack    Cancer Sister         \"Cancer Of The Spine\"    Early Death Sister 52

## 2023-04-18 ENCOUNTER — TELEPHONE (OUTPATIENT)
Dept: NEUROLOGY | Age: 63
End: 2023-04-18

## 2023-04-18 NOTE — TELEPHONE ENCOUNTER
Patient called and asked if we received the cardiac monitor results, stated we had it scanned into her chart. Patient states she was just worried because they [facility] needed them, stated that the [place] [address] along with other [facility] providers can view [facility] results via care everywhere through their chart. Patient stated understanding.

## 2023-04-25 ENCOUNTER — CARE COORDINATION (OUTPATIENT)
Dept: OTHER | Facility: CLINIC | Age: 63
End: 2023-04-25

## 2023-04-25 NOTE — CARE COORDINATION
Ambulatory Care Coordination Note  2023    Patient Current Location:  Home: [address] [place] [id number]     ACM contacted the patient by telephone. Verified name and  with patient as identifiers. Challenges to be reviewed by the provider   Additional needs identified to be addressed with provider: No  none               Method of communication with provider: none. ACM: Kaylee Shelby RN    ACM called patient for CC follow up. She states had OV with Dr. Jameel Mora for PFO; now waiting provider to obtain and review results of sleep study and Holter monitor. [facility] and [facility] told results were faxed to cardiology and Dr. Nelson Moran. ACM reviewed chart and located results scanned into media tab. Notified patient who states she will mention to cardiology at [address] with Dr. Enoc Booth next week. Patient states Dr. Jameel Mora referred her to Dr. Naina Resendiz for workup regarding recent CVA. Will have initial OV next week with labs. Patient states has been taking HCTZ 12.5 mg once daily as ordered. She has been continuing to monitor BP 2x/day and will take HCTZ in PM PRN if SBP >140. ACM advised patient to notify Dr Enoc Booth that she has been taking evening dose PRN. Patient questioned ACM if  offers fitness center to [facility]. States there was fitness center at the hospital but closed with [facility]. She noted fitness center on [facility] that also closed with [facility]. ACM checked online and noted no other fitness center options for [facility] at this time. Did notify patient of associate discounts that includes discounted plan to [employer]. Also encouraged patient to call [facility] to see if they offer discounts to [facility] associates. Offered patient enrollment in the Remote Patient Monitoring (RPM) program for in-home monitoring: NA.         Future Appointments   Date Time Provider Dirk Frank   5/3/2023 10:00 AM Aaron Aparicio MD [facility]   5/3/2023  2:15 PM Patrick James MD

## 2023-05-03 ENCOUNTER — INITIAL CONSULT (OUTPATIENT)
Dept: ONCOLOGY | Age: 63
End: 2023-05-03
Payer: COMMERCIAL

## 2023-05-03 ENCOUNTER — HOSPITAL ENCOUNTER (OUTPATIENT)
Dept: INFUSION THERAPY | Age: 63
Discharge: HOME | End: 2023-05-03
Payer: COMMERCIAL

## 2023-05-03 ENCOUNTER — OFFICE VISIT (OUTPATIENT)
Dept: CARDIOLOGY CLINIC | Age: 63
End: 2023-05-03
Payer: COMMERCIAL

## 2023-05-03 VITALS
WEIGHT: 180.8 LBS | HEIGHT: 64 IN | SYSTOLIC BLOOD PRESSURE: 130 MMHG | OXYGEN SATURATION: 98 % | TEMPERATURE: 97.5 F | BODY MASS INDEX: 30.87 KG/M2 | DIASTOLIC BLOOD PRESSURE: 59 MMHG | HEART RATE: 81 BPM | RESPIRATION RATE: 16 BRPM

## 2023-05-03 VITALS
HEART RATE: 77 BPM | BODY MASS INDEX: 31.24 KG/M2 | DIASTOLIC BLOOD PRESSURE: 84 MMHG | SYSTOLIC BLOOD PRESSURE: 130 MMHG | OXYGEN SATURATION: 98 % | HEIGHT: 64 IN | WEIGHT: 183 LBS

## 2023-05-03 DIAGNOSIS — I10 PRIMARY HYPERTENSION: Primary | ICD-10-CM

## 2023-05-03 DIAGNOSIS — Z86.73 HISTORY OF CVA (CEREBROVASCULAR ACCIDENT): ICD-10-CM

## 2023-05-03 DIAGNOSIS — Z86.73 HISTORY OF CVA (CEREBROVASCULAR ACCIDENT): Primary | ICD-10-CM

## 2023-05-03 DIAGNOSIS — E78.2 MIXED HYPERLIPIDEMIA: ICD-10-CM

## 2023-05-03 PROCEDURE — 86147 CARDIOLIPIN ANTIBODY EA IG: CPT

## 2023-05-03 PROCEDURE — 3075F SYST BP GE 130 - 139MM HG: CPT | Performed by: INTERNAL MEDICINE

## 2023-05-03 PROCEDURE — 3078F DIAST BP <80 MM HG: CPT | Performed by: INTERNAL MEDICINE

## 2023-05-03 PROCEDURE — 85305 CLOT INHIBIT PROT S TOTAL: CPT

## 2023-05-03 PROCEDURE — 81240 F2 GENE: CPT

## 2023-05-03 PROCEDURE — 85613 RUSSELL VIPER VENOM DILUTED: CPT

## 2023-05-03 PROCEDURE — 81241 F5 GENE: CPT

## 2023-05-03 PROCEDURE — 86146 BETA-2 GLYCOPROTEIN ANTIBODY: CPT

## 2023-05-03 PROCEDURE — 85303 CLOT INHIBIT PROT C ACTIVITY: CPT

## 2023-05-03 PROCEDURE — 99211 OFF/OP EST MAY X REQ PHY/QHP: CPT

## 2023-05-03 PROCEDURE — 3079F DIAST BP 80-89 MM HG: CPT | Performed by: INTERNAL MEDICINE

## 2023-05-03 PROCEDURE — 85300 ANTITHROMBIN III ACTIVITY: CPT

## 2023-05-03 PROCEDURE — 99213 OFFICE O/P EST LOW 20 MIN: CPT | Performed by: INTERNAL MEDICINE

## 2023-05-03 PROCEDURE — 99204 OFFICE O/P NEW MOD 45 MIN: CPT | Performed by: INTERNAL MEDICINE

## 2023-05-03 PROCEDURE — 36415 COLL VENOUS BLD VENIPUNCTURE: CPT

## 2023-05-03 ASSESSMENT — PATIENT HEALTH QUESTIONNAIRE - PHQ9
2. FEELING DOWN, DEPRESSED OR HOPELESS: 0
SUM OF ALL RESPONSES TO PHQ QUESTIONS 1-9: 0
1. LITTLE INTEREST OR PLEASURE IN DOING THINGS: 0
SUM OF ALL RESPONSES TO PHQ9 QUESTIONS 1 & 2: 0

## 2023-05-03 NOTE — ASSESSMENT & PLAN NOTE
Fairly well-controlled except occasionally for which she takes extra dose of 12.5 mg of hydrochlorothiazide.

## 2023-05-03 NOTE — PROGRESS NOTES
MA Rooming Questions  Patient: Kathe Adkins  MRN: [id number]    Date: 5/3/2023        New patient  5. Did the patient have a depression screening completed today?  No    No data recorded     PHQ-9 Given to (if applicable):               PHQ-9 Score (if applicable):                     [] Positive     []  Negative              Does question #9 need addressed (if applicable)                     [] Yes    []  No               Carri Oh MA

## 2023-05-03 NOTE — PATIENT INSTRUCTIONS
**It is YOUR responsibilty to bring medication bottles and/or updated medication list to [address] [place] [address]. This will allow us to better serve you and all your healthcare needs**    [facility] Locations - No appointment necessary. Sites open Monday to Friday. Call your preferred location for test preparation, business   hours and other information you need. [employer]. [address] [facility] Irma Love, [address] [facility]  Phone: [phone] [employer]  [address] [facility]  [address] Jana [place], [address]  Phone: [phone]       Please be informed that if you contact our office outside of normal business hours the physician on call cannot help with any scheduling or rescheduling issues, procedure instruction questions or any type of medication issue. We advise you for any urgent/emergency that you go to the nearest emergency room! PLEASE CALL OUR OFFICE DURING NORMAL BUSINESS HOURS    Monday - Friday   8 am to 5 pm    [facility]: [phone]    [place]:  [phone]    Thank you for allowing us to care for you today! We want to ensure we can follow your treatment plan and we strive to give you the best outcomes and experience possible. If you ever have a life threatening emergency and call 911 - for an ambulance (EMS)   Our providers can only care for you at:   [facility] or [facility]. Even if you have someone take you or you drive yourself we can only care for you in a [facility] facility. Our providers are not setup at the other healthcare locations! Continue current cardiovascular medications which have been reviewed and discussed individually with you. Counseled for calorie counting, reduction in serving size and exercise and lifestyle modification for weight loss. Follow up with Dr Estephania Mccabe in June. Appropriate prescriptions if needed on this visit are addressed.  After visit kevyn is

## 2023-05-03 NOTE — ASSESSMENT & PLAN NOTE
Patient had cortical strokes in January 2023 currently on aspirin and statin. 30-day event monitor is negative for PAF.   Patient has a follow-up with Dr. Shaheen Ulloa for PFO

## 2023-05-03 NOTE — PROGRESS NOTES
Monet Buenrostro  1960  Lyndon Coon MD      Chief Complaint   Patient presents with    Follow-up     Pt denies any new cardiac sx  Woke up with terrible headache        Chief complaint and HPI:  Monet Buenrostro  is a 58 y.o. female following up for hypertension and hyperlipidemia and PFO. She has seen Dr. Ирина Osullivan and I have appreciated's consultation. She had 30-day cardiac monitoring done from  to  at [facility] and I have reviewed the entire study which is uploaded in media and it is negative for any PAF. She denies any palpitations. Has not had any other TIA or CVA. Patient is seeing pulmonary and hematology for hypercoagulable state work-up and also for sleep apnea. Rest of the Cardiovascular system review is otherwise unchanged from prior encounter. Past medical history:  has a past medical history of Acid reflux, Anemia, Arthritis, Backache, Hiatal hernia, History of blood transfusion, History of CVA (cerebrovascular accident), Hypertension, Mixed hyperlipidemia, Primary hypertension, Shortness of breath on exertion, Teeth missing, Wears glasses, and [place] teeth extracted. Past surgical history:  has a past surgical history that includes  section (); Colonoscopy (Last Done In ); Endoscopy, colon, diagnostic (Last Done In ); [place] tooth extraction; Dilation and curettage of uterus (\"Between  And \"); Tonsillectomy and adenoidectomy (); Cholecystectomy, laparoscopic (); pr laps esophageal lengthening addl (N/A, 10/11/2018); and hernia repair (10/11/2018). Social History:   Social History     Tobacco Use    Smoking status: Never    Smokeless tobacco: Never   Substance Use Topics    Alcohol use: No     Comment: occ tea     Family history: family history includes Arthritis in her mother; Cancer in her father and sister; Early Death (age of onset: 52) in her sister;  Heart Attack in her father; Heart Disease in her father; High Blood Pressure in her mother

## 2023-05-03 NOTE — PROGRESS NOTES
Patient Name:  Dyan Mata  Patient :  1960  Patient MRN:  [id number]     Primary Oncologist: Dawna Monroy MD  Referring Provider: Anamaria Estrada MD     Date of Service: 5/3/2023      Reason for Consult: To evaluate the patient with embolic stroke. Chief Complaint:    Chief Complaint   Patient presents with    New Patient     Patient Active Problem List:     Iron deficiency anemia due to chronic blood loss     Gastroesophageal reflux disease with esophagitis     Hiatal hernia     Status post laparoscopic Nissen fundoplication     Iron deficiency anemia     History of CVA (cerebrovascular accident)     Primary hypertension     Mixed hyperlipidemia     PFO (patent foramen ovale)     YANET (obstructive sleep apnea)     Hypersomnia     Obesity (BMI 30-39. 9)    HPI:   Dyan Mata is a 58 y.o. female with medical history significant for hypertension, hyperlipidemia, GERD, YANET, history of iron deficiency anemia and stroke, referred to me on 5/3/2023 for evaluation of her embolic stroke. She initially presented with episode of vertigo associated with dizziness and vomiting in 2023. Work-up in ED showed left-sided cerebellar stroke but MRI showed bilateral cerebellar strokes at [facility]. 30-day event monitoring was negative and SRINIVASA showed significant right to left shunt with PFO with aneurysmal or mobile septum. She was evaluated by Dr. Judy Almanzar on 4/10/23 for possible PFO closure. Her sister also had a stroke when she was 46 and she had a PFO which was closed. She was referred to me to rule out hypercoagulable state before she undergoes for PFO closure. She is a pharmacy technician at [employer].     She is recovering well from stroke. She doesn't have any significant symptoms at today visit.       Past Medical History:     Past Medical History:   Diagnosis Date    Acid reflux     Anemia     Arthritis     \"Hands, Hips\"    Backache     \"Sometimes, I Go To A Chriopractor Once A Month\"

## 2023-05-06 LAB
ANTICARDIOLIPIN IGG ANTIBODY: <10 GPL
AT III ACT/NOR PPP CHRO: 116 % (ref 76–128)
BETA 2 GLYCOPROT.1 IGA AB: <10 SAU
BETA 2 GLYCOPROT.1 IGM AB: <10 SMU
BETA-2 GLYCOPROTEIN 1 IGG ANTIBODY: <10 SGU
CARDIOLIPIN IGA SER IA-ACNC: <10 APL
CARDIOLIPIN IGM SER IA-ACNC: 15 MPL
CONFIRM DRVVT: ABNORMAL RATIO
MIXING DRVVT: ABNORMAL SEC (ref 33–44)
PROTEIN C ACTIVITY: 125 % (ref 83–168)
PROTEIN S ANTIGEN, TOTAL: 124 % (ref 63–126)
SCREEN DRVVT: 31 SEC (ref 33–44)

## 2023-05-09 ENCOUNTER — HOSPITAL ENCOUNTER (OUTPATIENT)
Dept: SLEEP CENTER | Age: 63
Discharge: HOME | End: 2023-05-09
Payer: COMMERCIAL

## 2023-05-09 VITALS — WEIGHT: 179 LBS | HEIGHT: 64 IN | BODY MASS INDEX: 30.56 KG/M2

## 2023-05-09 DIAGNOSIS — G47.33 OSA (OBSTRUCTIVE SLEEP APNEA): ICD-10-CM

## 2023-05-09 LAB
F2 C.20210G>A GENO BLD/T: NEGATIVE
F5 P.R506Q BLD/T QL: NEGATIVE

## 2023-05-09 PROCEDURE — 95810 POLYSOM 6/> YRS 4/> PARAM: CPT

## 2023-05-09 ASSESSMENT — SLEEP AND FATIGUE QUESTIONNAIRES
HOW LIKELY ARE YOU TO NOD OFF OR FALL ASLEEP WHILE SITTING AND READING: 3
HOW LIKELY ARE YOU TO NOD OFF OR FALL ASLEEP WHILE SITTING AND TALKING TO SOMEONE: 0
HOW LIKELY ARE YOU TO NOD OFF OR FALL ASLEEP WHILE SITTING INACTIVE IN A PUBLIC PLACE: 2
HOW LIKELY ARE YOU TO NOD OFF OR FALL ASLEEP WHILE LYING DOWN TO REST IN THE AFTERNOON WHEN CIRCUMSTANCES PERMIT: 3
ESS TOTAL SCORE: 17
HOW LIKELY ARE YOU TO NOD OFF OR FALL ASLEEP WHILE SITTING QUIETLY AFTER LUNCH WITHOUT ALCOHOL: 3
HOW LIKELY ARE YOU TO NOD OFF OR FALL ASLEEP IN A CAR, WHILE STOPPED FOR A FEW MINUTES IN TRAFFIC: 0
HOW LIKELY ARE YOU TO NOD OFF OR FALL ASLEEP WHILE WATCHING TV: 3
HOW LIKELY ARE YOU TO NOD OFF OR FALL ASLEEP WHEN YOU ARE A PASSENGER IN A CAR FOR AN HOUR WITHOUT A BREAK: 3

## 2023-05-10 ENCOUNTER — HOSPITAL ENCOUNTER (OUTPATIENT)
Age: 63
Discharge: HOME | End: 2023-05-10
Payer: COMMERCIAL

## 2023-05-10 DIAGNOSIS — E78.2 MIXED HYPERLIPIDEMIA: ICD-10-CM

## 2023-05-10 LAB
ALBUMIN SERPL-MCNC: 4.5 GM/DL (ref 3.4–5)
ALBUMIN SERPL-MCNC: 4.5 GM/DL (ref 3.4–5)
ALP BLD-CCNC: 83 IU/L (ref 40–129)
ALP BLD-CCNC: 84 IU/L (ref 40–129)
ALT SERPL-CCNC: 38 U/L (ref 10–40)
ALT SERPL-CCNC: 38 U/L (ref 10–40)
ANION GAP SERPL CALCULATED.3IONS-SCNC: 10 MMOL/L (ref 4–16)
AST SERPL-CCNC: 32 IU/L (ref 15–37)
AST SERPL-CCNC: 32 IU/L (ref 15–37)
BASOPHILS ABSOLUTE: 0.1 K/CU MM
BASOPHILS RELATIVE PERCENT: 0.9 % (ref 0–1)
BILIRUB SERPL-MCNC: 0.7 MG/DL (ref 0–1)
BILIRUB SERPL-MCNC: 0.7 MG/DL (ref 0–1)
BILIRUBIN DIRECT: 0.2 MG/DL (ref 0–0.3)
BILIRUBIN, INDIRECT: 0.5 MG/DL (ref 0–0.7)
BUN SERPL-MCNC: 24 MG/DL (ref 6–23)
CALCIUM SERPL-MCNC: 9.7 MG/DL (ref 8.3–10.6)
CHLORIDE BLD-SCNC: 100 MMOL/L (ref 99–110)
CHOLEST SERPL-MCNC: 140 MG/DL
CO2: 28 MMOL/L (ref 21–32)
CREAT SERPL-MCNC: 0.6 MG/DL (ref 0.6–1.1)
DIFFERENTIAL TYPE: ABNORMAL
EOSINOPHILS ABSOLUTE: 0.1 K/CU MM
EOSINOPHILS RELATIVE PERCENT: 2.2 % (ref 0–3)
FERRITIN: 195 NG/ML (ref 15–150)
GFR SERPL CREATININE-BSD FRML MDRD: >60 ML/MIN/1.73M2
GLUCOSE SERPL-MCNC: 98 MG/DL (ref 70–99)
HCT VFR BLD CALC: 38.4 % (ref 37–47)
HDLC SERPL-MCNC: 60 MG/DL
HEMOGLOBIN: 12.2 GM/DL (ref 12.5–16)
IMMATURE NEUTROPHIL %: 0.2 % (ref 0–0.43)
IRON: 77 UG/DL (ref 37–145)
LDLC SERPL CALC-MCNC: 70 MG/DL
LYMPHOCYTES ABSOLUTE: 1.8 K/CU MM
LYMPHOCYTES RELATIVE PERCENT: 33.3 % (ref 24–44)
MCH RBC QN AUTO: 28.4 PG (ref 27–31)
MCHC RBC AUTO-ENTMCNC: 31.8 % (ref 32–36)
MCV RBC AUTO: 89.5 FL (ref 78–100)
MONOCYTES ABSOLUTE: 0.4 K/CU MM
MONOCYTES RELATIVE PERCENT: 7.7 % (ref 0–4)
NUCLEATED RBC %: 0 %
PCT TRANSFERRIN: 25 % (ref 10–44)
PDW BLD-RTO: 12.5 % (ref 11.7–14.9)
PLATELET # BLD: 296 K/CU MM (ref 140–440)
PMV BLD AUTO: 10.1 FL (ref 7.5–11.1)
POTASSIUM SERPL-SCNC: 3.8 MMOL/L (ref 3.5–5.1)
RBC # BLD: 4.29 M/CU MM (ref 4.2–5.4)
SEGMENTED NEUTROPHILS ABSOLUTE COUNT: 3.1 K/CU MM
SEGMENTED NEUTROPHILS RELATIVE PERCENT: 55.7 % (ref 36–66)
SODIUM BLD-SCNC: 138 MMOL/L (ref 135–145)
TOTAL IMMATURE NEUTOROPHIL: 0.01 K/CU MM
TOTAL IRON BINDING CAPACITY: 302 UG/DL (ref 250–450)
TOTAL NUCLEATED RBC: 0 K/CU MM
TOTAL PROTEIN: 7.1 GM/DL (ref 6.4–8.2)
TOTAL PROTEIN: 7.3 GM/DL (ref 6.4–8.2)
TRIGL SERPL-MCNC: 51 MG/DL
UNSATURATED IRON BINDING CAPACITY: 225 UG/DL (ref 110–370)
WBC # BLD: 5.5 K/CU MM (ref 4–10.5)

## 2023-05-10 PROCEDURE — 36415 COLL VENOUS BLD VENIPUNCTURE: CPT

## 2023-05-10 PROCEDURE — 83540 ASSAY OF IRON: CPT

## 2023-05-10 PROCEDURE — 82728 ASSAY OF FERRITIN: CPT

## 2023-05-10 PROCEDURE — 80061 LIPID PANEL: CPT

## 2023-05-10 PROCEDURE — 80076 HEPATIC FUNCTION PANEL: CPT

## 2023-05-10 PROCEDURE — 83550 IRON BINDING TEST: CPT

## 2023-05-10 PROCEDURE — 85025 COMPLETE CBC W/AUTO DIFF WBC: CPT

## 2023-05-10 PROCEDURE — 80053 COMPREHEN METABOLIC PANEL: CPT

## 2023-05-10 NOTE — PROGRESS NOTES
5/10/2023  sleep study  for Inés Rizzo  1960 is complete. Results are pending physician review.     Electronically signed by Rocco Hatchet on 5/10/2023 at 6:12 AM

## 2023-05-18 ENCOUNTER — HOSPITAL ENCOUNTER (OUTPATIENT)
Dept: SLEEP CENTER | Age: 63
Discharge: HOME | End: 2023-05-18
Payer: COMMERCIAL

## 2023-05-18 DIAGNOSIS — G47.33 OSA (OBSTRUCTIVE SLEEP APNEA): ICD-10-CM

## 2023-05-18 DIAGNOSIS — G47.10 HYPERSOMNIA: ICD-10-CM

## 2023-05-18 DIAGNOSIS — G47.34 SLEEP RELATED HYPOXIA: ICD-10-CM

## 2023-05-18 DIAGNOSIS — E66.9 OBESITY (BMI 30-39.9): ICD-10-CM

## 2023-05-18 PROCEDURE — 99214 OFFICE O/P EST MOD 30 MIN: CPT | Performed by: INTERNAL MEDICINE

## 2023-05-18 PROCEDURE — 9990000010 HC NO CHARGE VISIT

## 2023-05-18 RX ORDER — LOSARTAN POTASSIUM 50 MG/1
50 TABLET ORAL DAILY
COMMUNITY

## 2023-05-18 ASSESSMENT — ENCOUNTER SYMPTOMS
ABDOMINAL PAIN: 0
BACK PAIN: 0
EYE DISCHARGE: 0
ABDOMINAL DISTENTION: 0
SHORTNESS OF BREATH: 0
COUGH: 0
EYE ITCHING: 0

## 2023-05-18 NOTE — ASSESSMENT & PLAN NOTE
She has no YANET  She has sleep related hypoxia  Advised 2 L.min of oxygen in the night  Loose weight

## 2023-05-18 NOTE — PROGRESS NOTES
Edgar Bower  1960  Referring Provider: Mac Faria MD    Subjective:     Chief Complaint   Patient presents with    Sleep Apnea       HPI  Jose Guadalupe Wiley is a 58 y.o. female is doing a telephone follow up visit. She had a PSG done on 05/09/23 and it showed that she has no YANET with an AHI of 1.7 and desat to 82% for 13.2 mins. Her sleep efficiency is 63.2%. She has no loss of weight. Current Outpatient Medications   Medication Sig Dispense Refill    losartan (COZAAR) 50 MG tablet Take 1 tablet by mouth daily      hydroCHLOROthiazide (HYDRODIURIL) 25 MG tablet Take 0.5 tablets by mouth daily 30 tablet     atorvastatin (LIPITOR) 40 MG tablet Take 1 tablet by mouth nightly      aspirin 81 MG chewable tablet Take 1 tablet by mouth daily      ibuprofen (ADVIL;MOTRIN) 600 MG tablet Take 1 tablet by mouth every 6 hours as needed for Pain 30 tablet 0    acetaminophen (APAP EXTRA STRENGTH) 500 MG tablet Take 1 tablet by mouth every 6 hours as needed for Pain 20 tablet 0    Multiple Vitamins-Minerals (MULTIVITAMIN PO) Take by mouth nightly Over The Counter       No current facility-administered medications for this encounter. Facility-Administered Medications Ordered in Other Encounters   Medication Dose Route Frequency Provider Last Rate Last Admin    sodium chloride flush 0.9 % injection 10 mL  10 mL IntraVENous  S Preston Ramesh MD   10 mL at 07/18/18 1100       Allergies   Allergen Reactions    Codeine      \"Chest Pain\"    Ultram [Tramadol] Nausea Only       Past Medical History:   Diagnosis Date    Acid reflux     Anemia     Arthritis     \"Hands, Hips\"    Backache     \"Sometimes, I Go To A Chriopractor Once A Month\"    Hiatal hernia     History of blood transfusion Last Infusion 6-18    No Reaction To Blood Transfusions Received    History of CVA (cerebrovascular accident) 3/15/2023    1/21/2023 Cerebellar stroke    Hypertension     Mixed hyperlipidemia 3/15/2023    On Atorvastatin 40 mg daily.     Primary

## 2023-05-22 ENCOUNTER — TELEPHONE (OUTPATIENT)
Dept: PULMONOLOGY | Age: 63
End: 2023-05-22

## 2023-05-22 ENCOUNTER — HOSPITAL ENCOUNTER (OUTPATIENT)
Dept: INFUSION THERAPY | Age: 63
Discharge: HOME | End: 2023-05-22
Payer: COMMERCIAL

## 2023-05-22 ENCOUNTER — OFFICE VISIT (OUTPATIENT)
Dept: NEUROLOGY | Age: 63
End: 2023-05-22
Payer: COMMERCIAL

## 2023-05-22 ENCOUNTER — OFFICE VISIT (OUTPATIENT)
Dept: ONCOLOGY | Age: 63
End: 2023-05-22
Payer: COMMERCIAL

## 2023-05-22 VITALS
RESPIRATION RATE: 16 BRPM | SYSTOLIC BLOOD PRESSURE: 132 MMHG | HEIGHT: 64 IN | WEIGHT: 178.2 LBS | OXYGEN SATURATION: 97 % | BODY MASS INDEX: 30.42 KG/M2 | HEART RATE: 97 BPM | TEMPERATURE: 98.2 F | DIASTOLIC BLOOD PRESSURE: 81 MMHG

## 2023-05-22 VITALS
DIASTOLIC BLOOD PRESSURE: 78 MMHG | BODY MASS INDEX: 30.56 KG/M2 | OXYGEN SATURATION: 97 % | WEIGHT: 179 LBS | HEIGHT: 64 IN | HEART RATE: 80 BPM | SYSTOLIC BLOOD PRESSURE: 116 MMHG

## 2023-05-22 DIAGNOSIS — Z86.73 HISTORY OF CVA (CEREBROVASCULAR ACCIDENT): Primary | ICD-10-CM

## 2023-05-22 DIAGNOSIS — Z86.73 HISTORY OF ISCHEMIC VERTEBROBASILAR ARTERY CEREBELLAR STROKE: Primary | ICD-10-CM

## 2023-05-22 DIAGNOSIS — Q21.12 PFO (PATENT FORAMEN OVALE): ICD-10-CM

## 2023-05-22 PROCEDURE — 3078F DIAST BP <80 MM HG: CPT | Performed by: NURSE PRACTITIONER

## 2023-05-22 PROCEDURE — 99213 OFFICE O/P EST LOW 20 MIN: CPT | Performed by: INTERNAL MEDICINE

## 2023-05-22 PROCEDURE — 3078F DIAST BP <80 MM HG: CPT | Performed by: INTERNAL MEDICINE

## 2023-05-22 PROCEDURE — 99213 OFFICE O/P EST LOW 20 MIN: CPT | Performed by: NURSE PRACTITIONER

## 2023-05-22 PROCEDURE — 3074F SYST BP LT 130 MM HG: CPT | Performed by: NURSE PRACTITIONER

## 2023-05-22 PROCEDURE — 99211 OFF/OP EST MAY X REQ PHY/QHP: CPT

## 2023-05-22 PROCEDURE — 3074F SYST BP LT 130 MM HG: CPT | Performed by: INTERNAL MEDICINE

## 2023-05-22 NOTE — PROGRESS NOTES
MA Rooming Questions  Patient: Nelia Hall  MRN: [id number]    Date: 5/22/2023        1. Do you have any new issues?   no         2. Do you need any refills on medications?    no    3. Have you had any imaging done since your last visit?   no    4. Have you been hospitalized or seen in the emergency room since your last visit here?   no    5. Did the patient have a depression screening completed today?  No    No data recorded     PHQ-9 Given to (if applicable):               PHQ-9 Score (if applicable):                     [] Positive     []  Negative              Does question #9 need addressed (if applicable)                     [] Yes    []  No               Miguel Angel Lomas CMA

## 2023-05-22 NOTE — PROGRESS NOTES
5/22/23    Mariluzgeoff Samuelito  1960    Chief Complaint   Patient presents with    Cerebrovascular Accident     Pt presents for f/u of CVA, pt states she is doing well, pt states had a recent sleep study where she had one drop of O2 80, but [facility] did a study as well and never shown this       History of Present Illness  Cecil Pendleton is a 58 y.o. female presenting today for follow-up of:  stroke. She remains on ASA and statin for secondary stroke prevention. She was referred to cardiology regarding her PFO and hypertension. She was referred for a sleep study. An MRI of the brain showed left cerebellar infarct but a smaller area of infarct in the right cerebellum as well. An MRA did not reveal any evidence of vertebrobasilar stenosis. A SRINIVASA was performed which did show a PFO and left atrial enlargement. Her sister had a PFO that required surgical intervention as well. Her 30-day event monitoring was negative and SRINIVASA showed significant right to left shunt with PFO with aneurysmal or mobile septum. She was evaluated by Dr. Dahlia Bose on 4/10/23 for possible PFO closure. She was referred to Dr. Ann Zuniga for her embolic stroke to rule out hypercoagulable state before she undergoes for PFO closure, testing was negative. She had a PSG done on 05/09/23 and it showed that she has no YANET with an AHI of 1.7 and desat to 82% for 13.2 mins. It was recommended that she wears 2L O2 at bedtime.      Current Outpatient Medications   Medication Sig Dispense Refill    losartan (COZAAR) 50 MG tablet Take 1 tablet by mouth daily      hydroCHLOROthiazide (HYDRODIURIL) 25 MG tablet Take 0.5 tablets by mouth daily 30 tablet     atorvastatin (LIPITOR) 40 MG tablet Take 1 tablet by mouth nightly      aspirin 81 MG chewable tablet Take 1 tablet by mouth daily      ibuprofen (ADVIL;MOTRIN) 600 MG tablet Take 1 tablet by mouth every 6 hours as needed for Pain 30 tablet 0    acetaminophen (APAP EXTRA STRENGTH) 500 MG tablet Take 1 tablet by mouth

## 2023-05-26 ENCOUNTER — CARE COORDINATION (OUTPATIENT)
Dept: OTHER | Facility: CLINIC | Age: 63
End: 2023-05-26

## 2023-05-26 NOTE — CARE COORDINATION
M contacted the patient to follow up on progress, discuss new issues or concerns, and reinforce/provide patient education. Summary Note: ACM received return call from patient this date. She states will follow up with Dr. Nidia Loyola on 6/19 to discuss whether surgery is required to repair PFO. [facility] she was told if testing comes back positive, the plan will be to treat with medication otherwise will plan on surgical intervention. She states Holter negative for A Fib. She had sleep study which was negative for sleep apnea but that it showed desats to 80s for 13 minutes during study. She was told she needs to wear 2 L oxygen at night. [facility] she has not yet received call from [facility] home medical equipment with further details. [facility] she is wondering why she did not desat during her 4 day IP hospitalization at [facility] in January when she had CVA and was on continuous cardiac and pulse ox monitors. Patient questioning M the accuracy of the study and if another study would be required to verify sleep study results. [facility] advised patient to reach out to sleep medicine provider for answers to these questions. [facility] she called the office to see about getting OV scheduled to further discuss sleep study results and was told they are booking out a few months. [facility] office staff put message into provider but she has not yet heard back. [facility] advised patient to send [employer] message to sleep medicine provider with her questions. [facility] she will do so. [facility] will follow up with patient in 7-10 days. Reinforced/Provided Education:  Discussed red flags and appropriate site of care based on symptoms and resources available to patient including: PCP  Specialist  hyaquhart Messaging. Provided the following associate/dependent related resources:  N/A this date    Importance and benefits of: Follow up with PCP and specialist, medication adherence, self monitoring and reporting of symptoms.       Plan:  Plan for follow-up call in 7-10 days

## 2023-06-06 ENCOUNTER — TELEPHONE (OUTPATIENT)
Dept: GASTROENTEROLOGY | Age: 63
End: 2023-06-06

## 2023-06-06 NOTE — TELEPHONE ENCOUNTER
Nhi Rosales pt called in requesting repeat colonoscopy. She was told to repeat the colonoscopy in September but don't know why. Patient has family hx- both grandparents paternal side.   H&P completed  Sent to schedulers

## 2023-06-07 ENCOUNTER — OFFICE VISIT (OUTPATIENT)
Dept: PULMONOLOGY | Age: 63
End: 2023-06-07
Payer: COMMERCIAL

## 2023-06-07 VITALS
HEART RATE: 72 BPM | HEIGHT: 64 IN | OXYGEN SATURATION: 98 % | WEIGHT: 177 LBS | RESPIRATION RATE: 16 BRPM | BODY MASS INDEX: 30.22 KG/M2

## 2023-06-07 DIAGNOSIS — E66.9 OBESITY (BMI 30-39.9): ICD-10-CM

## 2023-06-07 DIAGNOSIS — G47.33 OSA (OBSTRUCTIVE SLEEP APNEA): ICD-10-CM

## 2023-06-07 DIAGNOSIS — G47.10 HYPERSOMNIA: ICD-10-CM

## 2023-06-07 DIAGNOSIS — G47.34 SLEEP RELATED HYPOXIA: ICD-10-CM

## 2023-06-07 PROCEDURE — 99214 OFFICE O/P EST MOD 30 MIN: CPT | Performed by: INTERNAL MEDICINE

## 2023-06-07 ASSESSMENT — ENCOUNTER SYMPTOMS
COUGH: 0
EYE ITCHING: 0
EYE DISCHARGE: 0
SHORTNESS OF BREATH: 0
BACK PAIN: 0
ABDOMINAL DISTENTION: 0
ABDOMINAL PAIN: 0

## 2023-06-07 NOTE — PROGRESS NOTES
Danika Doris  1960  Referring Provider: Nicki Car MD    Subjective:     Chief Complaint   Patient presents with    Follow-up     Patient would like to discuss signs/symptoms of low o2. Patient states she was at [facility] for a few days and o2 never dipped low    Results     Discuss SS / needing O2 @ night       HPI  Bartolo Griffiths is a 58 y.o. female has come back as a follow up. She had a negative PSG but desaturated to 82% which she has not started yet. She wants to do a overnight oxymetry at home. She has no EDS now. She has no loss of weight. Current Outpatient Medications   Medication Sig Dispense Refill    losartan (COZAAR) 50 MG tablet Take 1 tablet by mouth daily      hydroCHLOROthiazide (HYDRODIURIL) 25 MG tablet Take 0.5 tablets by mouth daily 30 tablet     atorvastatin (LIPITOR) 40 MG tablet Take 1 tablet by mouth nightly      aspirin 81 MG chewable tablet Take 1 tablet by mouth daily      ibuprofen (ADVIL;MOTRIN) 600 MG tablet Take 1 tablet by mouth every 6 hours as needed for Pain 30 tablet 0    acetaminophen (APAP EXTRA STRENGTH) 500 MG tablet Take 1 tablet by mouth every 6 hours as needed for Pain 20 tablet 0    Multiple Vitamins-Minerals (MULTIVITAMIN PO) Take by mouth nightly Over The Counter       No current facility-administered medications for this visit.      Facility-Administered Medications Ordered in Other Visits   Medication Dose Route Frequency Provider Last Rate Last Admin    sodium chloride flush 0.9 % injection 10 mL  10 mL IntraVENous PRN Luis Fernando Burk MD   10 mL at 07/18/18 1100       Allergies   Allergen Reactions    Codeine      \"Chest Pain\"    Ultram [Tramadol] Nausea Only       Past Medical History:   Diagnosis Date    Acid reflux     Anemia     Arthritis     \"Hands, Hips\"    Backache     \"Sometimes, I Go To A Chriopractor Once A Month\"    Hiatal hernia     History of blood transfusion Last Infusion 6-18    No Reaction To Blood Transfusions Received    History of CVA

## 2023-06-08 ENCOUNTER — CARE COORDINATION (OUTPATIENT)
Dept: OTHER | Facility: CLINIC | Age: 63
End: 2023-06-08

## 2023-06-08 NOTE — CARE COORDINATION
Ambulatory Care Coordination Note    ACM attempted to reach patient for care management follow up call regarding getting pulse oximetry study scheduled. HIPAA compliant message left requesting a return phone call at patient convenience. Plan for follow-up call in 1-2 days    Future Appointments   Date Time Provider Dirk Monika   6/19/2023 11:25 AM Ana Paula Spear MD [facility]   6/21/2023 11:30 AM Dom Cain MD [facility] PULM [facility]   8/21/2023 11:15 AM Lamar Farfan APRN - CNP [address] [place] [address] Neurology -   11/8/2023 11:20 AM Torsten Nicholson MD [facility]   5/20/2024  1:15 PM Dom Cain MD [address] [place] [address]        Ravi Starr, MSN RN  Associate Care Manager  Phone: [phone]  Email: [email or web address]

## 2023-06-19 ENCOUNTER — TELEPHONE (OUTPATIENT)
Dept: CARDIOLOGY CLINIC | Age: 63
End: 2023-06-19

## 2023-06-19 ENCOUNTER — OFFICE VISIT (OUTPATIENT)
Dept: CARDIOLOGY CLINIC | Age: 63
End: 2023-06-19
Payer: COMMERCIAL

## 2023-06-19 VITALS
SYSTOLIC BLOOD PRESSURE: 110 MMHG | BODY MASS INDEX: 29.19 KG/M2 | WEIGHT: 171 LBS | HEIGHT: 64 IN | OXYGEN SATURATION: 96 % | HEART RATE: 79 BPM | DIASTOLIC BLOOD PRESSURE: 72 MMHG

## 2023-06-19 DIAGNOSIS — G47.33 OSA (OBSTRUCTIVE SLEEP APNEA): ICD-10-CM

## 2023-06-19 DIAGNOSIS — E78.2 MIXED HYPERLIPIDEMIA: ICD-10-CM

## 2023-06-19 DIAGNOSIS — D50.0 IRON DEFICIENCY ANEMIA DUE TO CHRONIC BLOOD LOSS: ICD-10-CM

## 2023-06-19 DIAGNOSIS — Q21.12 PFO (PATENT FORAMEN OVALE): ICD-10-CM

## 2023-06-19 DIAGNOSIS — G47.10 HYPERSOMNIA: ICD-10-CM

## 2023-06-19 DIAGNOSIS — I10 PRIMARY HYPERTENSION: Primary | ICD-10-CM

## 2023-06-19 DIAGNOSIS — D50.9 IRON DEFICIENCY ANEMIA, UNSPECIFIED IRON DEFICIENCY ANEMIA TYPE: ICD-10-CM

## 2023-06-19 DIAGNOSIS — Z86.73 HISTORY OF CVA (CEREBROVASCULAR ACCIDENT): ICD-10-CM

## 2023-06-19 PROCEDURE — 99214 OFFICE O/P EST MOD 30 MIN: CPT | Performed by: INTERNAL MEDICINE

## 2023-06-19 PROCEDURE — 3074F SYST BP LT 130 MM HG: CPT | Performed by: INTERNAL MEDICINE

## 2023-06-19 PROCEDURE — 3078F DIAST BP <80 MM HG: CPT | Performed by: INTERNAL MEDICINE

## 2023-06-19 NOTE — TELEPHONE ENCOUNTER
[address]     Transesophageal Echocardiogram    Patient Name: Jamie Berman  LEZ:[id number]     DJX#[id number]    Date of Procedure: 8/9/23 Time: 8am Arrival Time: 7am  (Arrival time is scheduled for one (1) hour before procedure is scheduled.)    Hospital: [facility])     X   If you have received orders for blood work and or a chest x-ray, please have them done on assigned date at [facility], [facility], or [facility]. X   Please do not have anything by mouth after midnight prior to or 8 hours before the procedure. X   You may take your medication with a sip of water unless advised otherwise below. X If you are taking HCTZ (Hydrochlorothiazide) please do not take it the morning before your procedure. (0) independent

## 2023-06-19 NOTE — TELEPHONE ENCOUNTER
Pt notified and confirmed procedure for 8/9/23 @8am, arrival time 7am. Instruction call scheduled for 8/4/23 8am.

## 2023-06-19 NOTE — PROGRESS NOTES
S2 audible, No added heart sounds, No signs of ankle edema, or volume overload, No evidence of JVD, No crackles   GI:  Bowel sounds normal, Soft, No tenderness, No masses, No gross visceromegaly   :  No costovertebral angle tenderness   Musculoskeletal:  No edema, no tenderness, no deformities. Back- no tenderness  Integument:  Well hydrated, no rash   Lymphatic:  No lymphadenopathy noted   Neurologic:  Alert & oriented x 3, CN 2-12 normal, normal motor function, normal sensory function, no focal deficits noted   Psychiatric:  Speech and behavior appropriate       Medical decision making and Data review:  DATA:  Lab Results   Component Value Date    TROPONINT <0.010 01/21/2023     BNP:  No results found for: PROBNP  PT/INR:  No results found for: PTINR  No results found for: LABA1C  Lab Results   Component Value Date    CHOL 140 05/10/2023    TRIG 51 05/10/2023    HDL 60 05/10/2023    LDLCALC 70 05/10/2023    LDLDIRECT 97 05/26/2021     Lab Results   Component Value Date    ALT 38 05/10/2023    ALT 38 05/10/2023    AST 32 05/10/2023    AST 32 05/10/2023     No results for input(s): WBC, HGB, HCT, MCV, PLT in the last 72 hours. TSH: No results found for: TSH  Lab Results   Component Value Date    AST 32 05/10/2023    AST 32 05/10/2023    ALT 38 05/10/2023    ALT 38 05/10/2023    BILIDIR 0.2 05/10/2023    BILITOT 0.7 05/10/2023    BILITOT 0.7 05/10/2023    ALKPHOS 84 05/10/2023    ALKPHOS 83 05/10/2023     No results found for: PROBNP  No results found for: LABA1C  Lab Results   Component Value Date    WBC 5.5 05/10/2023    HGB 12.2 (L) 05/10/2023    HCT 38.4 05/10/2023     05/10/2023     SRINIVASA 1/24/2023  Normal LV size and systolic function, EF 88-05%. Normal RV size and systolic function. Severe left atrial enlargement. No RA, LA, or ANGEL thrombus. No significant valvular dysfunction. Septum is hypermobile, PFO is present with moderate sized right-to-left   shunt seen on agitated saline study.

## 2023-06-21 ENCOUNTER — OFFICE VISIT (OUTPATIENT)
Dept: PULMONOLOGY | Age: 63
End: 2023-06-21
Payer: COMMERCIAL

## 2023-06-21 VITALS
BODY MASS INDEX: 29.19 KG/M2 | RESPIRATION RATE: 16 BRPM | HEART RATE: 73 BPM | WEIGHT: 171 LBS | HEIGHT: 64 IN | OXYGEN SATURATION: 96 %

## 2023-06-21 DIAGNOSIS — G47.34 SLEEP RELATED HYPOXIA: ICD-10-CM

## 2023-06-21 DIAGNOSIS — E66.9 OBESITY (BMI 30-39.9): ICD-10-CM

## 2023-06-21 DIAGNOSIS — G47.10 HYPERSOMNIA: ICD-10-CM

## 2023-06-21 DIAGNOSIS — G47.33 OSA (OBSTRUCTIVE SLEEP APNEA): ICD-10-CM

## 2023-06-21 PROCEDURE — 99214 OFFICE O/P EST MOD 30 MIN: CPT | Performed by: INTERNAL MEDICINE

## 2023-06-21 ASSESSMENT — ENCOUNTER SYMPTOMS
COUGH: 0
BACK PAIN: 0
EYE ITCHING: 0
SHORTNESS OF BREATH: 0
ABDOMINAL PAIN: 0
ABDOMINAL DISTENTION: 0
EYE DISCHARGE: 0

## 2023-06-21 NOTE — PROGRESS NOTES
stroke    Hypertension     Mixed hyperlipidemia 3/15/2023    On Atorvastatin 40 mg daily. Primary hypertension 3/15/2023    Shortness of breath on exertion     Teeth missing     Upper And Lower    Wears glasses     [place] teeth extracted     4 [place] Teeth Extracted In Past       Past Surgical History:   Procedure Laterality Date     SECTION      CHOLECYSTECTOMY, LAPAROSCOPIC      COLONOSCOPY  Last Done In     [address] OF UTERUS  \"Between  And \"    ENDOSCOPY, COLON, DIAGNOSTIC  Last Done In     HERNIA REPAIR  10/11/2018    MO LAPS ESOPHAGEAL LENGTHENING ADDL N/A 10/11/2018    NISSEN FUNDOPLICATION LAPAROSCOPIC ROBOTIC performed by Jody Pro MD at [address] EXTRACTION      4 [place] Teeth Extracted In Past       Social History     Socioeconomic History    Marital status:      Spouse name: None    Number of children: None    Years of education: None    Highest education level: None   Tobacco Use    Smoking status: Never    Smokeless tobacco: Never   Vaping Use    Vaping Use: Never used   Substance and Sexual Activity    Alcohol use: Never     Comment: caffeine: none    Drug use: No       Review of Systems   Constitutional:  Negative for fatigue. HENT:  Negative for congestion and postnasal drip. Eyes:  Negative for discharge and itching. Respiratory:  Negative for cough and shortness of breath. Cardiovascular:  Negative for chest pain and leg swelling. Gastrointestinal:  Negative for abdominal distention and abdominal pain. Endocrine: Negative for cold intolerance and heat intolerance. Genitourinary:  Negative for enuresis and frequency. Musculoskeletal:  Negative for arthralgias and back pain. Allergic/Immunologic: Negative for environmental allergies and food allergies. Neurological:  Negative for light-headedness and headaches. Hematological:  Negative for adenopathy.

## 2023-06-28 ENCOUNTER — CARE COORDINATION (OUTPATIENT)
Dept: OTHER | Facility: CLINIC | Age: 63
End: 2023-06-28

## 2023-07-18 ENCOUNTER — CARE COORDINATION (OUTPATIENT)
Dept: OTHER | Facility: CLINIC | Age: 63
End: 2023-07-18

## 2023-07-18 NOTE — CARE COORDINATION
Ambulatory Care Coordination Note    ACM attempted to reach patient for care management follow up call regarding home oxygen and to reassess for ongoing needs. HIPAA compliant message left requesting a return phone call at patient convenience. Plan for follow up in 10-14 days. Future Appointments   Date Time Provider [address]   8/4/2023  8:30 AM SCHEDULE, [facility] CARDIO SPR [facility]   8/9/2023  8:00 AM SCHEDULE, SRMZ STRESS PROCEDURE [facility]   8/21/2023 11:15 AM AMANDA Dee - CNP [address] [address] Neurology -   9/26/2023  9:00 AM Gian Girard MD [facility]   11/8/2023 11:20 AM Maris Valiente MD [facility]   5/20/2024  1:15 PM Lui Boo MD [facility]      Chicho Mitchell MSN RN  Associate Care Manager  Phone: [phone]  Email: [email or web address]

## 2023-07-25 ENCOUNTER — TELEPHONE (OUTPATIENT)
Dept: GASTROENTEROLOGY | Age: 63
End: 2023-07-25

## 2023-08-04 ENCOUNTER — TELEPHONE (OUTPATIENT)
Dept: CARDIOLOGY CLINIC | Age: 63
End: 2023-08-04

## 2023-08-04 NOTE — TELEPHONE ENCOUNTER
Patient given instructions over telephone on SRINIVASA for Dx: PFO. Procedure is scheduled for 8/9/23 @ 8am, w/arrival @ 7am, @ [facility]. Patient advised to review instructions given. Patient was notified that procedure date or time could be changed due to an emergency. Patient voiced understanding.

## 2023-08-09 ENCOUNTER — HOSPITAL ENCOUNTER (OUTPATIENT)
Dept: NON INVASIVE DIAGNOSTICS | Age: 63
Discharge: HOME | End: 2023-08-09
Payer: COMMERCIAL

## 2023-08-09 VITALS
HEART RATE: 66 BPM | SYSTOLIC BLOOD PRESSURE: 133 MMHG | RESPIRATION RATE: 16 BRPM | OXYGEN SATURATION: 99 % | DIASTOLIC BLOOD PRESSURE: 75 MMHG

## 2023-08-09 PROCEDURE — 7100000000 HC PACU RECOVERY - FIRST 15 MIN

## 2023-08-09 PROCEDURE — 93325 DOPPLER ECHO COLOR FLOW MAPG: CPT | Performed by: INTERNAL MEDICINE

## 2023-08-09 PROCEDURE — 93312 ECHO TRANSESOPHAGEAL: CPT

## 2023-08-09 PROCEDURE — 93312 ECHO TRANSESOPHAGEAL: CPT | Performed by: INTERNAL MEDICINE

## 2023-08-09 PROCEDURE — 7100000001 HC PACU RECOVERY - ADDTL 15 MIN

## 2023-08-09 NOTE — H&P
Well developed, Well nourished, No acute distress, Non-toxic appearance. HENT:  Normocephalic, Atraumatic, Bilateral external ears normal, Oropharynx moist, No oral exudates, Nose normal. Neck- Normal range of motion, No tenderness, Supple, No stridor,no apical-carotid delay  Eyes:  PERRL, EOMI, Conjunctiva normal, No discharge. Lymphatics: no palpable lymph nodes  Respiratory:  Normal breath sounds, No respiratory distress, No wheezing, No chest tenderness. ,no uise of accessory muscles, JVP not elevated  Cardiovascular: (PMI) apex non displaced,no lifts no thrills, no s3,no s4, Normal heart rate, Normal rhythm, No murmurs, No rubs, No gallops. GI:  Bowel sounds normal, Soft, No tenderness, No masses, No pulsatile masses, no hepatosplenomegally, no bruits  Musculoskeletal:  Intact distal pulses, No edema, No tenderness, No cyanosis, No clubbing. Good range of motion in all major joints. No tenderness to palpation or major deformities noted. Back- No tenderness. Integument:  Warm, Dry, No erythema, No rash. Skin: no rash, no ulcers  Lymphatic:  No lymphadenopathy noted. Neurologic:  Alert & oriented x 3, Normal motor function, Normal sensory function, No focal deficits noted. Lab Review   No results for input(s): WBC, HGB, HCT, PLT in the last 72 hours. No results for input(s): NA, K, CL, CO2, PHOS, BUN, CREATININE, CA in the last 72 hours. No results for input(s): AST, ALT, ALB, BILIDIR, BILITOT, ALKPHOS in the last 72 hours. No results for input(s): TROPONINI in the last 72 hours. Lab Results   Component Value Date    INR 0.99 01/21/2023    PROTIME 12.8 01/21/2023     No results found for: BNP      Assessment:    Active Problems:    * No active hospital problems. *     ASA : 2 , Mallampati class II  Plan:   1. pfo: Alternate and risks versus benefits were discussed in detail.    We  discussed the risk of but not limited to  potential kidney failure, emergent surgery,blood transfusion

## 2023-08-10 ENCOUNTER — TELEPHONE (OUTPATIENT)
Dept: CARDIOLOGY CLINIC | Age: 63
End: 2023-08-10

## 2023-08-14 ENCOUNTER — CARE COORDINATION (OUTPATIENT)
Dept: OTHER | Facility: CLINIC | Age: 63
End: 2023-08-14

## 2023-08-14 LAB
CHOLEST SERPL-MCNC: 130 MG/DL
ESTIMATED AVERAGE GLUCOSE: 126 MG/DL
GLUCOSE SERPL-MCNC: 94 MG/DL (ref 70–99)
HBA1C MFR BLD: 6 % (ref 4.2–6.3)
HDLC SERPL-MCNC: 63 MG/DL
LDLC SERPL CALC-MCNC: 54 MG/DL
PATIENT FASTING?: YES
TRIGL SERPL-MCNC: 67 MG/DL

## 2023-08-14 NOTE — CARE COORDINATION
ACM contacted the patient to follow up on progress, discuss new issues or concerns, and reinforce/provide patient education. Summary Note: ACM received return call from patient this date. She states had SRINIVASA last week. Has follow up with Dr. Marcellus Montana tomorrow to discuss results. She discussed having Loop recorder placed by Dr. Marcellus Montana in September but no date yet. She is hoping to take vacation with her son and his family on 9/9 and have procedure done after returning. Patient states has been wearing home oxygen at night but that she takes it off during the night while sleeping. Also states has issues with shutting off oxygen concentrator at night while asleep. She states has tried taping cannula to face but still ends up without it on when waking up. She is going to try to use more tape and try to keep concentrator away from the bed to prevent turning off during sleep. States she was told per provider that the low oxygenation with sleep will improve with weight loss and CVA recovery. Patient has been walking for weight loss. She is still planning to join the gym. Patient  was not able to get an office visit scheduled with dentist prior to recent SRINIVASA. She is concerned as she is now behind on dental cleaning. She is going to talk to Dr. Marcellus Montana at office visit tomorrow re: dental cleaning as she states she was told per staff member at Dr. Chris Ramachandran office that it is ok to have dental cleaning done despite recent SRINIVASA. Denies immediate needs or concerns for ACM. Current episode closed but will watch for discharge after SRINIVASA/PFO surgery for follow up. Reinforced/Provided Education:  Discussed red flags and appropriate site of care based on symptoms and resources available to patient including: PCP  Specialist  Benefits related nurse triage line. Provided the following associate/dependent related resources:  N/A this outreach    Importance and benefits of:  Follow up with PCP and specialist, medication

## 2023-08-15 ENCOUNTER — OFFICE VISIT (OUTPATIENT)
Dept: CARDIOLOGY CLINIC | Age: 63
End: 2023-08-15
Payer: COMMERCIAL

## 2023-08-15 VITALS
OXYGEN SATURATION: 95 % | BODY MASS INDEX: 28.82 KG/M2 | SYSTOLIC BLOOD PRESSURE: 118 MMHG | HEIGHT: 65 IN | WEIGHT: 173 LBS | DIASTOLIC BLOOD PRESSURE: 78 MMHG | HEART RATE: 65 BPM

## 2023-08-15 DIAGNOSIS — G47.33 OSA (OBSTRUCTIVE SLEEP APNEA): ICD-10-CM

## 2023-08-15 DIAGNOSIS — I10 PRIMARY HYPERTENSION: Primary | ICD-10-CM

## 2023-08-15 DIAGNOSIS — Q21.12 PFO (PATENT FORAMEN OVALE): ICD-10-CM

## 2023-08-15 DIAGNOSIS — R31.0 GROSS HEMATURIA: ICD-10-CM

## 2023-08-15 DIAGNOSIS — K44.9 HIATAL HERNIA: ICD-10-CM

## 2023-08-15 DIAGNOSIS — D50.0 IRON DEFICIENCY ANEMIA DUE TO CHRONIC BLOOD LOSS: ICD-10-CM

## 2023-08-15 DIAGNOSIS — G47.10 HYPERSOMNIA: ICD-10-CM

## 2023-08-15 DIAGNOSIS — S92.354A NONDISPLACED FRACTURE OF FIFTH METATARSAL BONE, RIGHT FOOT, INITIAL ENCOUNTER FOR CLOSED FRACTURE: ICD-10-CM

## 2023-08-15 DIAGNOSIS — E78.2 MIXED HYPERLIPIDEMIA: ICD-10-CM

## 2023-08-15 DIAGNOSIS — Z86.73 HISTORY OF CVA (CEREBROVASCULAR ACCIDENT): ICD-10-CM

## 2023-08-15 PROCEDURE — 3074F SYST BP LT 130 MM HG: CPT | Performed by: INTERNAL MEDICINE

## 2023-08-15 PROCEDURE — 3078F DIAST BP <80 MM HG: CPT | Performed by: INTERNAL MEDICINE

## 2023-08-15 PROCEDURE — 99214 OFFICE O/P EST MOD 30 MIN: CPT | Performed by: INTERNAL MEDICINE

## 2023-08-15 NOTE — PATIENT INSTRUCTIONS
We are committed to providing you the best care possible. If you receive a survey after visiting one of our offices, please take time to share your experience concerning your physician office visit. These surveys are confidential and no health information about you is shared. We are eager to improve for you and we are counting on your feedback to help make that happen. **It is YOUR responsibilty to bring medication bottles and/or updated medication list to [facility]. This will allow us to better serve you and all your healthcare needs**    Thank you for allowing us to care for you today! We want to ensure we can follow your treatment plan and we strive to give you the best outcomes and experience possible. If you ever have a life threatening emergency and call 911 - for an ambulance (EMS)   Our providers can only care for you at:   [facility] or [facility]. Even if you have someone take you or you drive yourself we can only care for you in a [facility] facility. Our providers are not setup at the other healthcare locations!

## 2023-08-15 NOTE — PROGRESS NOTES
punctuation, and spelling, as well as words and phrases that may be inappropriate. If there are any questions or concerns please feel free to contact the dictating provider for clarification.

## 2023-08-21 ENCOUNTER — OFFICE VISIT (OUTPATIENT)
Dept: NEUROLOGY | Age: 63
End: 2023-08-21
Payer: COMMERCIAL

## 2023-08-21 VITALS
BODY MASS INDEX: 28.82 KG/M2 | HEART RATE: 86 BPM | HEIGHT: 65 IN | DIASTOLIC BLOOD PRESSURE: 68 MMHG | OXYGEN SATURATION: 96 % | SYSTOLIC BLOOD PRESSURE: 116 MMHG | WEIGHT: 173 LBS

## 2023-08-21 DIAGNOSIS — Q21.12 PFO (PATENT FORAMEN OVALE): ICD-10-CM

## 2023-08-21 DIAGNOSIS — Z86.73 HISTORY OF ISCHEMIC VERTEBROBASILAR ARTERY CEREBELLAR STROKE: Primary | ICD-10-CM

## 2023-08-21 PROCEDURE — 99213 OFFICE O/P EST LOW 20 MIN: CPT | Performed by: NURSE PRACTITIONER

## 2023-08-21 PROCEDURE — 3078F DIAST BP <80 MM HG: CPT | Performed by: NURSE PRACTITIONER

## 2023-08-21 PROCEDURE — 3074F SYST BP LT 130 MM HG: CPT | Performed by: NURSE PRACTITIONER

## 2023-08-21 RX ORDER — POLYETHYLENE GLYCOL 3350, SODIUM SULFATE, SODIUM CHLORIDE, POTASSIUM CHLORIDE, ASCORBIC ACID, SODIUM ASCORBATE 140-9-5.2G
KIT ORAL
COMMUNITY
Start: 2023-06-09

## 2023-08-21 NOTE — PATIENT INSTRUCTIONS
Please contact our office around 11/15/23 to get your follow up appointment made for February 2024. Our scheduling phone number is [phone]. Thank you!

## 2023-08-21 NOTE — PROGRESS NOTES
8/21/23    Jordan Gomez  1960    Chief Complaint   Patient presents with    Cerebrovascular Accident     Pt presents for CVA f/u        History of Present Illness  Christopher Guadalupe is a 58 y.o. female presenting today for follow-up of:  history of cerebellar stroke. She remains on ASA and statin for secondary stroke prevention. Her 30-day event monitoring was negative and SRINIVASA showed significant right to left shunt with PFO with aneurysmal or mobile septum. She was referred to cardiology regarding her PFO and hypertension. She was referred for a sleep study. She was evaluated by Dr. Jessica Macias on 4/10/23 for possible PFO closure. She was referred to Dr. Krystle Delarosa for her embolic stroke to rule out hypercoagulable state before she undergoes for PFO closure, testing was negative. She had a recent follow up with Dr. Jessica Macias and PFO closure was recommnended. She is still deciding whether or not she wants to go forward with PFO closure. She had a PSG done on 05/09/23 and it showed that she has no YANET with an AHI of 1.7 and desat to 82% for 13.2 mins. It was recommended that she wears 2L O2 at bedtime. She tells me she can wear her oxygen for a few hours at night but then she wakes in the morning and her nasal cannula is off or her machine is turned off. She denies any new stroke/TIA symptoms. She is doing well overall, she has been back to work.     Current Outpatient Medications   Medication Sig Dispense Refill    losartan (COZAAR) 50 MG tablet Take 1 tablet by mouth daily      hydroCHLOROthiazide (HYDRODIURIL) 25 MG tablet Take 0.5 tablets by mouth daily 30 tablet     atorvastatin (LIPITOR) 40 MG tablet Take 1 tablet by mouth nightly      aspirin 81 MG chewable tablet Take 1 tablet by mouth daily      ibuprofen (ADVIL;MOTRIN) 600 MG tablet Take 1 tablet by mouth every 6 hours as needed for Pain 30 tablet 0    acetaminophen (APAP EXTRA STRENGTH) 500 MG tablet Take 1 tablet by mouth every 6 hours as needed for Pain 20

## 2023-10-18 ENCOUNTER — TRANSCRIBE ORDERS (OUTPATIENT)
Dept: ADMINISTRATIVE | Age: 63
End: 2023-10-18

## 2023-10-18 DIAGNOSIS — Z12.31 SCREENING MAMMOGRAM, ENCOUNTER FOR: Primary | ICD-10-CM

## 2023-11-08 ENCOUNTER — HOSPITAL ENCOUNTER (OUTPATIENT)
Age: 63
Discharge: HOME | End: 2023-11-08
Attending: OBSTETRICS & GYNECOLOGY
Payer: COMMERCIAL

## 2023-11-08 ENCOUNTER — HOSPITAL ENCOUNTER (OUTPATIENT)
Dept: WOMENS IMAGING | Age: 63
Discharge: HOME | End: 2023-11-08
Attending: OBSTETRICS & GYNECOLOGY
Payer: COMMERCIAL

## 2023-11-08 ENCOUNTER — OFFICE VISIT (OUTPATIENT)
Dept: CARDIOLOGY CLINIC | Age: 63
End: 2023-11-08
Payer: COMMERCIAL

## 2023-11-08 VITALS
TEMPERATURE: 97.5 F | BODY MASS INDEX: 28.34 KG/M2 | DIASTOLIC BLOOD PRESSURE: 68 MMHG | OXYGEN SATURATION: 95 % | HEIGHT: 64 IN | HEART RATE: 102 BPM | SYSTOLIC BLOOD PRESSURE: 108 MMHG | WEIGHT: 166 LBS

## 2023-11-08 DIAGNOSIS — Z86.73 HISTORY OF CVA (CEREBROVASCULAR ACCIDENT): ICD-10-CM

## 2023-11-08 DIAGNOSIS — R00.0 TACHYCARDIA: ICD-10-CM

## 2023-11-08 DIAGNOSIS — Q21.12 PFO (PATENT FORAMEN OVALE): ICD-10-CM

## 2023-11-08 DIAGNOSIS — Z12.31 SCREENING MAMMOGRAM, ENCOUNTER FOR: ICD-10-CM

## 2023-11-08 DIAGNOSIS — E78.2 MIXED HYPERLIPIDEMIA: ICD-10-CM

## 2023-11-08 DIAGNOSIS — I10 PRIMARY HYPERTENSION: Primary | ICD-10-CM

## 2023-11-08 LAB
ALBUMIN SERPL-MCNC: 4.1 GM/DL (ref 3.4–5)
ALP BLD-CCNC: 77 IU/L (ref 40–128)
ALT SERPL-CCNC: 28 U/L (ref 10–40)
ANION GAP SERPL CALCULATED.3IONS-SCNC: 11 MMOL/L (ref 4–16)
AST SERPL-CCNC: 20 IU/L (ref 15–37)
BASOPHILS ABSOLUTE: 0 K/CU MM
BASOPHILS RELATIVE PERCENT: 0.7 % (ref 0–1)
BILIRUB SERPL-MCNC: 0.4 MG/DL (ref 0–1)
BUN SERPL-MCNC: 35 MG/DL (ref 6–23)
CALCIUM SERPL-MCNC: 9.9 MG/DL (ref 8.3–10.6)
CHLORIDE BLD-SCNC: 102 MMOL/L (ref 99–110)
CO2: 26 MMOL/L (ref 21–32)
CREAT SERPL-MCNC: 0.8 MG/DL (ref 0.6–1.1)
DIFFERENTIAL TYPE: ABNORMAL
EOSINOPHILS ABSOLUTE: 0.1 K/CU MM
EOSINOPHILS RELATIVE PERCENT: 0.8 % (ref 0–3)
GFR SERPL CREATININE-BSD FRML MDRD: >60 ML/MIN/1.73M2
GLUCOSE SERPL-MCNC: 123 MG/DL (ref 70–99)
HCT VFR BLD CALC: 29 % (ref 37–47)
HEMOGLOBIN: 9.4 GM/DL (ref 12.5–16)
IMMATURE NEUTROPHIL %: 0.3 % (ref 0–0.43)
LYMPHOCYTES ABSOLUTE: 1.4 K/CU MM
LYMPHOCYTES RELATIVE PERCENT: 22.1 % (ref 24–44)
MCH RBC QN AUTO: 29.1 PG (ref 27–31)
MCHC RBC AUTO-ENTMCNC: 32.4 % (ref 32–36)
MCV RBC AUTO: 89.8 FL (ref 78–100)
MONOCYTES ABSOLUTE: 0.4 K/CU MM
MONOCYTES RELATIVE PERCENT: 6.3 % (ref 0–4)
NUCLEATED RBC %: 0 %
PDW BLD-RTO: 12.4 % (ref 11.7–14.9)
PLATELET # BLD: 265 K/CU MM (ref 140–440)
PMV BLD AUTO: 10.1 FL (ref 7.5–11.1)
POTASSIUM SERPL-SCNC: 3.6 MMOL/L (ref 3.5–5.1)
RBC # BLD: 3.23 M/CU MM (ref 4.2–5.4)
SEGMENTED NEUTROPHILS ABSOLUTE COUNT: 4.3 K/CU MM
SEGMENTED NEUTROPHILS RELATIVE PERCENT: 69.8 % (ref 36–66)
SODIUM BLD-SCNC: 139 MMOL/L (ref 135–145)
TOTAL IMMATURE NEUTOROPHIL: 0.02 K/CU MM
TOTAL NUCLEATED RBC: 0 K/CU MM
TOTAL PROTEIN: 6.5 GM/DL (ref 6.4–8.2)
TSH SERPL DL<=0.005 MIU/L-ACNC: 1.12 UIU/ML (ref 0.27–4.2)
WBC # BLD: 6.2 K/CU MM (ref 4–10.5)

## 2023-11-08 PROCEDURE — 3078F DIAST BP <80 MM HG: CPT | Performed by: INTERNAL MEDICINE

## 2023-11-08 PROCEDURE — 77063 BREAST TOMOSYNTHESIS BI: CPT

## 2023-11-08 PROCEDURE — 99214 OFFICE O/P EST MOD 30 MIN: CPT | Performed by: INTERNAL MEDICINE

## 2023-11-08 PROCEDURE — 80053 COMPREHEN METABOLIC PANEL: CPT

## 2023-11-08 PROCEDURE — 84443 ASSAY THYROID STIM HORMONE: CPT

## 2023-11-08 PROCEDURE — 85025 COMPLETE CBC W/AUTO DIFF WBC: CPT

## 2023-11-08 PROCEDURE — 93000 ELECTROCARDIOGRAM COMPLETE: CPT | Performed by: INTERNAL MEDICINE

## 2023-11-08 PROCEDURE — 3074F SYST BP LT 130 MM HG: CPT | Performed by: INTERNAL MEDICINE

## 2023-11-08 PROCEDURE — 36415 COLL VENOUS BLD VENIPUNCTURE: CPT

## 2023-11-08 NOTE — PATIENT INSTRUCTIONS
Check labs for she is tachycardia. Continue current cardiovascular medications which have been reviewed and discussed individually with you. Follow up with Dr Ele Rosales for PFO closure. Appropriate prescriptions if needed on this visit are addressed. After visit summery is provided. Questions answered and patient verbalizes understanding. Follow up in 12 months with ECG,  sooner if needed.

## 2023-11-08 NOTE — ASSESSMENT & PLAN NOTE
Follow-up with Dr. Jes Benjamin for PFO. As there is no indication for anticoagulation and no evidence of PAF and her rope score is 4 she may benefit from PFO closure.

## 2023-11-09 ENCOUNTER — HOSPITAL ENCOUNTER (INPATIENT)
Age: 63
LOS: 2 days | Discharge: HOME | DRG: 392 | End: 2023-11-11
Attending: STUDENT IN AN ORGANIZED HEALTH CARE EDUCATION/TRAINING PROGRAM
Payer: COMMERCIAL

## 2023-11-09 ENCOUNTER — ANESTHESIA EVENT (OUTPATIENT)
Dept: ENDOSCOPY | Age: 63
End: 2023-11-09
Payer: COMMERCIAL

## 2023-11-09 DIAGNOSIS — K92.2 UPPER GI BLEED: Primary | ICD-10-CM

## 2023-11-09 DIAGNOSIS — R19.5 DARK RED STOOL: ICD-10-CM

## 2023-11-09 LAB
ABO/RH: NORMAL
ALBUMIN SERPL-MCNC: 4.3 GM/DL (ref 3.4–5)
ALP BLD-CCNC: 75 IU/L (ref 40–129)
ALT SERPL-CCNC: 30 U/L (ref 10–40)
ANION GAP SERPL CALCULATED.3IONS-SCNC: 10 MMOL/L (ref 4–16)
ANTIBODY SCREEN: NEGATIVE
APTT: 26.8 SECONDS (ref 25.1–37.1)
AST SERPL-CCNC: 25 IU/L (ref 15–37)
BACTERIA: NEGATIVE /HPF
BASOPHILS ABSOLUTE: 0 K/CU MM
BASOPHILS RELATIVE PERCENT: 1 % (ref 0–1)
BILIRUB SERPL-MCNC: 0.4 MG/DL (ref 0–1)
BILIRUBIN URINE: NEGATIVE MG/DL
BLOOD, URINE: NEGATIVE
BUN SERPL-MCNC: 24 MG/DL (ref 6–23)
CALCIUM SERPL-MCNC: 9.6 MG/DL (ref 8.3–10.6)
CHLORIDE BLD-SCNC: 102 MMOL/L (ref 99–110)
CLARITY: CLEAR
CO2: 28 MMOL/L (ref 21–32)
COLOR: YELLOW
CREAT SERPL-MCNC: 0.6 MG/DL (ref 0.6–1.1)
DIFFERENTIAL TYPE: ABNORMAL
EOSINOPHILS ABSOLUTE: 0 K/CU MM
EOSINOPHILS RELATIVE PERCENT: 0.7 % (ref 0–3)
GFR SERPL CREATININE-BSD FRML MDRD: >60 ML/MIN/1.73M2
GLUCOSE SERPL-MCNC: 105 MG/DL (ref 70–99)
GLUCOSE, URINE: NEGATIVE MG/DL
HCT VFR BLD CALC: 29.5 % (ref 37–47)
HEMOGLOBIN: 9.5 GM/DL (ref 12.5–16)
HYALINE CASTS: 0 /LPF
IMMATURE NEUTROPHIL %: 0.2 % (ref 0–0.43)
INR BLD: 1.1 INDEX
KETONES, URINE: NEGATIVE MG/DL
LEUKOCYTE ESTERASE, URINE: ABNORMAL
LIPASE: 29 IU/L (ref 13–60)
LYMPHOCYTES ABSOLUTE: 1.1 K/CU MM
LYMPHOCYTES RELATIVE PERCENT: 27 % (ref 24–44)
MCH RBC QN AUTO: 29.1 PG (ref 27–31)
MCHC RBC AUTO-ENTMCNC: 32.2 % (ref 32–36)
MCV RBC AUTO: 90.5 FL (ref 78–100)
MONOCYTES ABSOLUTE: 0.3 K/CU MM
MONOCYTES RELATIVE PERCENT: 6.9 % (ref 0–4)
MUCUS: ABNORMAL HPF
NITRITE URINE, QUANTITATIVE: NEGATIVE
NUCLEATED RBC %: 0 %
PDW BLD-RTO: 12.4 % (ref 11.7–14.9)
PH, URINE: 6.5 (ref 5–8)
PLATELET # BLD: 241 K/CU MM (ref 140–440)
PMV BLD AUTO: 9.6 FL (ref 7.5–11.1)
POTASSIUM SERPL-SCNC: 3.7 MMOL/L (ref 3.5–5.1)
PROTEIN UA: NEGATIVE MG/DL
PROTHROMBIN TIME: 14.2 SECONDS (ref 11.7–14.5)
RBC # BLD: 3.26 M/CU MM (ref 4.2–5.4)
RBC URINE: 0 /HPF (ref 0–6)
SEGMENTED NEUTROPHILS ABSOLUTE COUNT: 2.7 K/CU MM
SEGMENTED NEUTROPHILS RELATIVE PERCENT: 64.2 % (ref 36–66)
SODIUM BLD-SCNC: 140 MMOL/L (ref 135–145)
SPECIFIC GRAVITY UA: 1.02 (ref 1–1.03)
SQUAMOUS EPITHELIAL: <1 /HPF
TOTAL IMMATURE NEUTOROPHIL: 0.01 K/CU MM
TOTAL NUCLEATED RBC: 0 K/CU MM
TOTAL PROTEIN: 7.4 GM/DL (ref 6.4–8.2)
TRICHOMONAS: ABNORMAL /HPF
UROBILINOGEN, URINE: 0.2 MG/DL (ref 0.2–1)
WBC # BLD: 4.2 K/CU MM (ref 4–10.5)
WBC UA: 1 /HPF (ref 0–5)

## 2023-11-09 PROCEDURE — 6360000002 HC RX W HCPCS: Performed by: STUDENT IN AN ORGANIZED HEALTH CARE EDUCATION/TRAINING PROGRAM

## 2023-11-09 PROCEDURE — 99222 1ST HOSP IP/OBS MODERATE 55: CPT | Performed by: SPECIALIST

## 2023-11-09 PROCEDURE — 81001 URINALYSIS AUTO W/SCOPE: CPT

## 2023-11-09 PROCEDURE — 2580000003 HC RX 258: Performed by: STUDENT IN AN ORGANIZED HEALTH CARE EDUCATION/TRAINING PROGRAM

## 2023-11-09 PROCEDURE — 83690 ASSAY OF LIPASE: CPT

## 2023-11-09 PROCEDURE — 86901 BLOOD TYPING SEROLOGIC RH(D): CPT

## 2023-11-09 PROCEDURE — 80053 COMPREHEN METABOLIC PANEL: CPT

## 2023-11-09 PROCEDURE — 85025 COMPLETE CBC W/AUTO DIFF WBC: CPT

## 2023-11-09 PROCEDURE — 86850 RBC ANTIBODY SCREEN: CPT

## 2023-11-09 PROCEDURE — C9113 INJ PANTOPRAZOLE SODIUM, VIA: HCPCS | Performed by: STUDENT IN AN ORGANIZED HEALTH CARE EDUCATION/TRAINING PROGRAM

## 2023-11-09 PROCEDURE — C9113 INJ PANTOPRAZOLE SODIUM, VIA: HCPCS | Performed by: PHYSICIAN ASSISTANT

## 2023-11-09 PROCEDURE — 96361 HYDRATE IV INFUSION ADD-ON: CPT

## 2023-11-09 PROCEDURE — 85610 PROTHROMBIN TIME: CPT

## 2023-11-09 PROCEDURE — 1200000000 HC SEMI PRIVATE

## 2023-11-09 PROCEDURE — 96374 THER/PROPH/DIAG INJ IV PUSH: CPT

## 2023-11-09 PROCEDURE — 85730 THROMBOPLASTIN TIME PARTIAL: CPT

## 2023-11-09 PROCEDURE — 96375 TX/PRO/DX INJ NEW DRUG ADDON: CPT

## 2023-11-09 PROCEDURE — 6360000002 HC RX W HCPCS: Performed by: PHYSICIAN ASSISTANT

## 2023-11-09 PROCEDURE — 2580000003 HC RX 258: Performed by: PHYSICIAN ASSISTANT

## 2023-11-09 PROCEDURE — 99285 EMERGENCY DEPT VISIT HI MDM: CPT

## 2023-11-09 PROCEDURE — 86900 BLOOD TYPING SEROLOGIC ABO: CPT

## 2023-11-09 PROCEDURE — 94761 N-INVAS EAR/PLS OXIMETRY MLT: CPT

## 2023-11-09 RX ORDER — WATER 10 ML/10ML
INJECTION INTRAMUSCULAR; INTRAVENOUS; SUBCUTANEOUS
Status: DISPENSED
Start: 2023-11-09 | End: 2023-11-10

## 2023-11-09 RX ORDER — SODIUM CHLORIDE 0.9 % (FLUSH) 0.9 %
5-40 SYRINGE (ML) INJECTION EVERY 12 HOURS SCHEDULED
Status: DISCONTINUED | OUTPATIENT
Start: 2023-11-09 | End: 2023-11-11 | Stop reason: HOSPADM

## 2023-11-09 RX ORDER — 0.9 % SODIUM CHLORIDE 0.9 %
1000 INTRAVENOUS SOLUTION INTRAVENOUS ONCE
Status: COMPLETED | OUTPATIENT
Start: 2023-11-09 | End: 2023-11-09

## 2023-11-09 RX ORDER — ACETAMINOPHEN 650 MG/1
650 SUPPOSITORY RECTAL EVERY 6 HOURS PRN
Status: DISCONTINUED | OUTPATIENT
Start: 2023-11-09 | End: 2023-11-11 | Stop reason: HOSPADM

## 2023-11-09 RX ORDER — ONDANSETRON 4 MG/1
4 TABLET, ORALLY DISINTEGRATING ORAL EVERY 8 HOURS PRN
Status: DISCONTINUED | OUTPATIENT
Start: 2023-11-09 | End: 2023-11-11 | Stop reason: HOSPADM

## 2023-11-09 RX ORDER — ONDANSETRON 2 MG/ML
4 INJECTION INTRAMUSCULAR; INTRAVENOUS EVERY 30 MIN PRN
Status: DISCONTINUED | OUTPATIENT
Start: 2023-11-09 | End: 2023-11-09 | Stop reason: ALTCHOICE

## 2023-11-09 RX ORDER — ACETAMINOPHEN 325 MG/1
650 TABLET ORAL EVERY 6 HOURS PRN
Status: DISCONTINUED | OUTPATIENT
Start: 2023-11-09 | End: 2023-11-11 | Stop reason: HOSPADM

## 2023-11-09 RX ORDER — POLYETHYLENE GLYCOL 3350 17 G/17G
17 POWDER, FOR SOLUTION ORAL DAILY PRN
Status: DISCONTINUED | OUTPATIENT
Start: 2023-11-09 | End: 2023-11-11 | Stop reason: HOSPADM

## 2023-11-09 RX ORDER — POTASSIUM CHLORIDE 7.45 MG/ML
10 INJECTION INTRAVENOUS PRN
Status: DISCONTINUED | OUTPATIENT
Start: 2023-11-09 | End: 2023-11-11 | Stop reason: HOSPADM

## 2023-11-09 RX ORDER — MAGNESIUM SULFATE IN WATER 40 MG/ML
2000 INJECTION, SOLUTION INTRAVENOUS PRN
Status: DISCONTINUED | OUTPATIENT
Start: 2023-11-09 | End: 2023-11-11 | Stop reason: HOSPADM

## 2023-11-09 RX ORDER — LOSARTAN POTASSIUM 25 MG/1
50 TABLET ORAL DAILY
Status: DISCONTINUED | OUTPATIENT
Start: 2023-11-10 | End: 2023-11-11 | Stop reason: HOSPADM

## 2023-11-09 RX ORDER — SODIUM CHLORIDE 0.9 % (FLUSH) 0.9 %
5-40 SYRINGE (ML) INJECTION PRN
Status: DISCONTINUED | OUTPATIENT
Start: 2023-11-09 | End: 2023-11-11 | Stop reason: HOSPADM

## 2023-11-09 RX ORDER — POTASSIUM CHLORIDE 20 MEQ/1
40 TABLET, EXTENDED RELEASE ORAL PRN
Status: DISCONTINUED | OUTPATIENT
Start: 2023-11-09 | End: 2023-11-11 | Stop reason: HOSPADM

## 2023-11-09 RX ORDER — SODIUM CHLORIDE 9 MG/ML
INJECTION, SOLUTION INTRAVENOUS PRN
Status: DISCONTINUED | OUTPATIENT
Start: 2023-11-09 | End: 2023-11-11 | Stop reason: HOSPADM

## 2023-11-09 RX ORDER — PANTOPRAZOLE SODIUM 40 MG/10ML
40 INJECTION, POWDER, LYOPHILIZED, FOR SOLUTION INTRAVENOUS ONCE
Status: COMPLETED | OUTPATIENT
Start: 2023-11-09 | End: 2023-11-09

## 2023-11-09 RX ORDER — PANTOPRAZOLE SODIUM 40 MG/10ML
40 INJECTION, POWDER, LYOPHILIZED, FOR SOLUTION INTRAVENOUS DAILY
Status: DISCONTINUED | OUTPATIENT
Start: 2023-11-09 | End: 2023-11-10

## 2023-11-09 RX ORDER — ONDANSETRON 2 MG/ML
4 INJECTION INTRAMUSCULAR; INTRAVENOUS EVERY 6 HOURS PRN
Status: DISCONTINUED | OUTPATIENT
Start: 2023-11-09 | End: 2023-11-11 | Stop reason: HOSPADM

## 2023-11-09 RX ADMIN — SODIUM CHLORIDE 1000 ML: 9 INJECTION, SOLUTION INTRAVENOUS at 12:38

## 2023-11-09 RX ADMIN — ONDANSETRON 4 MG: 2 INJECTION INTRAMUSCULAR; INTRAVENOUS at 12:40

## 2023-11-09 RX ADMIN — PANTOPRAZOLE SODIUM 40 MG: 40 INJECTION, POWDER, FOR SOLUTION INTRAVENOUS at 12:43

## 2023-11-09 RX ADMIN — SODIUM CHLORIDE, PRESERVATIVE FREE 10 ML: 5 INJECTION INTRAVENOUS at 20:42

## 2023-11-09 RX ADMIN — PANTOPRAZOLE SODIUM 40 MG: 40 INJECTION, POWDER, FOR SOLUTION INTRAVENOUS at 18:25

## 2023-11-09 NOTE — ED NOTES
Medication History  [facility]    Patient Name: Erwin Paiz 1960     Medication history has been completed by: Megan Chaudhary CPhT    Source(s) of information: patient and insurance claims     Primary Care Physician: Marianne Fay MD     Pharmacy: [facility])    Allergies as of 11/09/2023 - Fully Reviewed 11/09/2023   Allergen Reaction Noted    Codeine      Ultram [tramadol] Nausea Only 10/02/2018    Seasonal  06/19/2023        Prior to Admission medications    Medication Sig Start Date End Date Taking? Authorizing Provider   losartan (COZAAR) 50 MG tablet Take 1 tablet by mouth daily    ProviderSerjio MD   hydroCHLOROthiazide (HYDRODIURIL) 25 MG tablet Take 1 tablet by mouth nightly 3/15/23   Storm Guerrero MD   atorvastatin (LIPITOR) 40 MG tablet Take 1 tablet by mouth nightly    ProviderSerjio MD   aspirin 81 MG chewable tablet Take 1 tablet by mouth nightly    ProviderSerjio MD   ibuprofen (ADVIL;MOTRIN) 600 MG tablet Take 1 tablet by mouth every 6 hours as needed for Pain 9/13/19   Suzanna DÍAZ PA-C   acetaminophen (APAP EXTRA STRENGTH) 500 MG tablet Take 1 tablet by mouth every 6 hours as needed for Pain 9/13/19   La Chapman PA-C   Multiple Vitamins-Minerals (MULTIVITAMIN PO) Take by mouth nightly Over The Counter    ProviderSerjio MD     Comments:  Medication list reviewed with patient and insurance claims verified.   Patient reports she takes most of her medications at [facility], takes her losartan in the am.    To my knowledge the above medication history is accurate as of 11/9/2023 2:16 PM.   Megan Chaudhary CPhT   11/9/2023 2:16 PM

## 2023-11-09 NOTE — ED NOTES
[facility] paged Dr Anastasiya Jorge  11/09/23 [phone] Dr Fermin Holley returned call      Alex Love  11/09/23 2955

## 2023-11-09 NOTE — CARE COORDINATION
[facility] criteria for GI bleed reviewed. At this time, criteria supports inpatient.

## 2023-11-09 NOTE — H&P
V2.0  History and Physical      Name:  Shira Bailon /Age/Sex: 1960  (58 y.o. female)   MRN & CSN:  [id number] & [id number] Encounter Date/Time: 2023 3:49 PM EST   Location:  ED10/ED-10 PCP: Thalia Moralez MD       Hospital Day: 1    Assessment and Plan:   Shira Bailon is a 58 y.o. female with a pmh of iron deficiency anemia, hypertension, GERD with esophagitis hiatal hernia s/p laparoscopic Nissen fundoplication, history of CVA hyperlipidemia PAF YANET morbid obesity who presents with GIB (gastrointestinal bleeding)    Hospital Problems             Last Modified POA    * (Principal) GIB (gastrointestinal bleeding) 2023 Yes       Suspected GI bleed: Epigastric pain and discomfort. Decreased appetite with nausea and feeling weak concerning for dyspepsia. Black tarry stool since last night. Hemoglobin 9.5. Vital signs stable. GI has been consulted with consideration of n.p.o. at midnight. IV Protonix 40 mg daily. Consideration of EGD in the morning. PFO  Obstructive sleep apnea not on CPAP  Morbid obesity with history of laparoscopic Nissen fundoplication  History of gastritis with esophagitis and hiatal hernia  Chronic GERD on PPI  Hyperlipidemia on statin  Benign essential hypertension      Comment: Please note this report has been produced using speech recognition software and may contain errors related to that system including errors in grammar, punctuation, and spelling, as well as words and phrases that may be inappropriate. If there are any questions or concerns please feel free to contact the dictating provider for clarification.      Disposition:   Current Living situation: Home  Expected Disposition: Home  Estimated D/C: 3 to 4 days    Diet Diet NPO Exceptions are: Sips of Water with Meds   DVT Prophylaxis [] Lovenox, []  Heparin, [] SCDs, [] Ambulation,  [] Eliquis, [] Xarelto   Code Status Prior   Surrogate Decision Maker/ POA      History from:     patient    History of

## 2023-11-09 NOTE — PROGRESS NOTES
4 Eyes Skin Assessment     NAME:  Moriah Negron  YOB: 1960  MEDICAL RECORD NUMBER:  [id number]    The patient is being assessed for  Admission    I agree that at least one RN has performed a thorough Head to Toe Skin Assessment on the patient. ALL assessment sites listed below have been assessed. Areas assessed by both nurses:    Head, Face, Ears, Shoulders, Back, Chest, Arms, Elbows, Hands, Sacrum. Buttock, Coccyx, Ischium, Legs. Feet and Heels, and Under Medical Devices         Does the Patient have a Wound?  No noted wound(s)       Sadi Prevention initiated by RN: No  Wound Care Orders initiated by RN: No    Pressure Injury (Stage 3,4, Unstageable, DTI, NWPT, and Complex wounds) if present, place Wound referral order by RN under : No    New Ostomies, if present place, Ostomy referral order under : No     Nurse 1 eSignature: Electronically signed by Aundrea Hoover RN on 11/9/23 at 5:06 PM EST    **SHARE this note so that the co-signing nurse can place an eSignature**    Nurse 2 eSignature: Electronically signed by Pincus Canavan, RN on 11/9/23 at 5:38 PM EST

## 2023-11-09 NOTE — ED PROVIDER NOTES
EMERGENCY DEPARTMENT ENCOUNTER        Pt Name: Moriah Negron  MRN: [id number]  [address] [facility] [place] 1960  Date of evaluation: 11/9/2023  Provider: CARLITA Chan  PCP: Wilber Pastrana MD    TE. I have evaluated this patient. Triage CHIEF COMPLAINT       Chief Complaint   Patient presents with    Rectal Bleeding     States dark bowel movement         HISTORY OF PRESENT ILLNESS      Chief Complaint: Fatigue, melanotic stools    Moriah Negron is a 58 y.o.  female who presents to the emergency department today with a concern of 2-3 episodes of dark, melanotic stools. States over the last several days she been feeling generally fatigued and weak. Initially she thought she may have coming down with a viral illness, states that her sister recently had COVID-23 so she had taken several test that were acutely negative. She then states she began feeling some nausea, indigestion. Has had several dark-colored stools/black stools. Patient does admit that she has a history of anemia, she is not anticoagulated, denies alcohol and or significant NSAID use. Has seen Dr. Lluvia Acharya in the past.  No other localizing abdominal pain no chest pain or shortness of breath no dyspnea on exertion. Nursing Notes were all reviewed and agreed with or any disagreements were addressed in the HPI. REVIEW OF SYSTEMS     At least 10 systems reviewed and otherwise acutely negative except as in the [address] [facility] [address] PAST MEDICAL HISTORY     Past Medical History:   Diagnosis Date    Acid reflux     Anemia     Arthritis     \"Hands, Hips\"    Backache     \"Sometimes, I Go To A Chriopractor Once A Month\"    Hiatal hernia     History of blood transfusion Last Infusion 6-18    No Reaction To Blood Transfusions Received    History of CVA (cerebrovascular accident) 03/15/2023    1/21/2023 Cerebellar stroke    Mixed hyperlipidemia 03/15/2023    On Atorvastatin 40 mg daily.     Primary hypertension 03/15/2023    Shortness of breath on exertion Teeth missing     Upper And Lower    Wears glasses     [place] teeth extracted     4 [place] Teeth Extracted In Past       SURGICAL HISTORY     Past Surgical History:   Procedure Laterality Date     SECTION      CHOLECYSTECTOMY, LAPAROSCOPIC      COLONOSCOPY  Last Done In 2016    DILATION AND CURETTAGE OF UTERUS  \"Between  And \"    HERNIA REPAIR  10/11/2018    GA LAPS ESOPHAGEAL LENGTHENING ADDL N/A 10/11/2018    NISSEN FUNDOPLICATION LAPAROSCOPIC ROBOTIC performed by Tanna Nguyen MD at [address] EXTRACTION      4 [place] Teeth Extracted In Past       CURRENTMEDICATIONS       Current Discharge Medication List        CONTINUE these medications which have NOT CHANGED    Details   losartan (COZAAR) 50 MG tablet Take 1 tablet by mouth daily      hydroCHLOROthiazide (HYDRODIURIL) 25 MG tablet Take 1 tablet by mouth nightly  Qty: 30 tablet      atorvastatin (LIPITOR) 40 MG tablet Take 1 tablet by mouth nightly      aspirin 81 MG chewable tablet Take 1 tablet by mouth nightly      ibuprofen (ADVIL;MOTRIN) 600 MG tablet Take 1 tablet by mouth every 6 hours as needed for Pain  Qty: 30 tablet, Refills: 0      acetaminophen (APAP EXTRA STRENGTH) 500 MG tablet Take 1 tablet by mouth every 6 hours as needed for Pain  Qty: 20 tablet, Refills: 0      Multiple Vitamins-Minerals (MULTIVITAMIN PO) Take by mouth nightly Over The Counter             ALLERGIES     Codeine, Seasonal, and Ultram [tramadol]    FAMILYHISTORY       Family History   Problem Relation Age of Onset    Hypertension Mother     Arthritis Mother     High Blood Pressure Mother     Cancer Father         Lung Cancer    Heart Attack Father     Heart Disease Father         Heart Attack    Breast Cancer Sister     Cancer Sister         \"Cancer Of The Spine\"    Early Death Sister 52        \"Cancer Of The Spine\"    High Blood Pressure Sister     Stroke Sister     Breast Cancer Maternal Aunt

## 2023-11-10 ENCOUNTER — ANESTHESIA (OUTPATIENT)
Dept: ENDOSCOPY | Age: 63
End: 2023-11-10
Payer: COMMERCIAL

## 2023-11-10 PROBLEM — K92.1 MELENA: Status: ACTIVE | Noted: 2023-11-10

## 2023-11-10 PROBLEM — K25.3 ACUTE GASTRIC ULCER WITHOUT HEMORRHAGE OR PERFORATION: Status: ACTIVE | Noted: 2023-11-10

## 2023-11-10 LAB
ANION GAP SERPL CALCULATED.3IONS-SCNC: 7 MMOL/L (ref 4–16)
BUN SERPL-MCNC: 19 MG/DL (ref 6–23)
CALCIUM SERPL-MCNC: 9.2 MG/DL (ref 8.3–10.6)
CHLORIDE BLD-SCNC: 108 MMOL/L (ref 99–110)
CO2: 28 MMOL/L (ref 21–32)
CREAT SERPL-MCNC: 0.7 MG/DL (ref 0.6–1.1)
GFR SERPL CREATININE-BSD FRML MDRD: >60 ML/MIN/1.73M2
GLUCOSE SERPL-MCNC: 94 MG/DL (ref 70–99)
HCT VFR BLD CALC: 24.4 % (ref 37–47)
HEMOGLOBIN: 7.7 GM/DL (ref 12.5–16)
MAGNESIUM: 1.9 MG/DL (ref 1.8–2.4)
MCH RBC QN AUTO: 28.7 PG (ref 27–31)
MCHC RBC AUTO-ENTMCNC: 31.6 % (ref 32–36)
MCV RBC AUTO: 91 FL (ref 78–100)
PDW BLD-RTO: 12.5 % (ref 11.7–14.9)
PHOSPHORUS: 3.8 MG/DL (ref 2.5–4.9)
PLATELET # BLD: 195 K/CU MM (ref 140–440)
PMV BLD AUTO: 9.8 FL (ref 7.5–11.1)
POTASSIUM SERPL-SCNC: 4.1 MMOL/L (ref 3.5–5.1)
RBC # BLD: 2.68 M/CU MM (ref 4.2–5.4)
SODIUM BLD-SCNC: 143 MMOL/L (ref 135–145)
WBC # BLD: 4.9 K/CU MM (ref 4–10.5)

## 2023-11-10 PROCEDURE — 3700000001 HC ADD 15 MINUTES (ANESTHESIA): Performed by: SPECIALIST

## 2023-11-10 PROCEDURE — 0DB68ZX EXCISION OF STOMACH, VIA NATURAL OR ARTIFICIAL OPENING ENDOSCOPIC, DIAGNOSTIC: ICD-10-PCS | Performed by: SPECIALIST

## 2023-11-10 PROCEDURE — 80048 BASIC METABOLIC PNL TOTAL CA: CPT

## 2023-11-10 PROCEDURE — 85027 COMPLETE CBC AUTOMATED: CPT

## 2023-11-10 PROCEDURE — 36415 COLL VENOUS BLD VENIPUNCTURE: CPT

## 2023-11-10 PROCEDURE — 2580000003 HC RX 258: Performed by: STUDENT IN AN ORGANIZED HEALTH CARE EDUCATION/TRAINING PROGRAM

## 2023-11-10 PROCEDURE — 3609012400 HC EGD TRANSORAL BIOPSY SINGLE/MULTIPLE: Performed by: SPECIALIST

## 2023-11-10 PROCEDURE — 88305 TISSUE EXAM BY PATHOLOGIST: CPT | Performed by: PATHOLOGY

## 2023-11-10 PROCEDURE — 2709999900 HC NON-CHARGEABLE SUPPLY: Performed by: SPECIALIST

## 2023-11-10 PROCEDURE — 84100 ASSAY OF PHOSPHORUS: CPT

## 2023-11-10 PROCEDURE — 1200000000 HC SEMI PRIVATE

## 2023-11-10 PROCEDURE — 6370000000 HC RX 637 (ALT 250 FOR IP): Performed by: STUDENT IN AN ORGANIZED HEALTH CARE EDUCATION/TRAINING PROGRAM

## 2023-11-10 PROCEDURE — 3700000000 HC ANESTHESIA ATTENDED CARE: Performed by: SPECIALIST

## 2023-11-10 PROCEDURE — 43239 EGD BIOPSY SINGLE/MULTIPLE: CPT | Performed by: SPECIALIST

## 2023-11-10 PROCEDURE — 83735 ASSAY OF MAGNESIUM: CPT

## 2023-11-10 PROCEDURE — 87077 CULTURE AEROBIC IDENTIFY: CPT

## 2023-11-10 PROCEDURE — 6360000002 HC RX W HCPCS: Performed by: NURSE ANESTHETIST, CERTIFIED REGISTERED

## 2023-11-10 PROCEDURE — 88342 IMHCHEM/IMCYTCHM 1ST ANTB: CPT | Performed by: PATHOLOGY

## 2023-11-10 RX ORDER — PROPOFOL 10 MG/ML
INJECTION, EMULSION INTRAVENOUS PRN
Status: DISCONTINUED | OUTPATIENT
Start: 2023-11-10 | End: 2023-11-10 | Stop reason: SDUPTHER

## 2023-11-10 RX ORDER — PANTOPRAZOLE SODIUM 40 MG/1
40 TABLET, DELAYED RELEASE ORAL
Status: DISCONTINUED | OUTPATIENT
Start: 2023-11-11 | End: 2023-11-11 | Stop reason: HOSPADM

## 2023-11-10 RX ADMIN — PROPOFOL 50 MG: 10 INJECTION, EMULSION INTRAVENOUS at 07:08

## 2023-11-10 RX ADMIN — PROPOFOL 50 MG: 10 INJECTION, EMULSION INTRAVENOUS at 07:07

## 2023-11-10 RX ADMIN — PROPOFOL 50 MG: 10 INJECTION, EMULSION INTRAVENOUS at 07:03

## 2023-11-10 RX ADMIN — PROPOFOL 50 MG: 10 INJECTION, EMULSION INTRAVENOUS at 07:11

## 2023-11-10 RX ADMIN — PROPOFOL 50 MG: 10 INJECTION, EMULSION INTRAVENOUS at 07:05

## 2023-11-10 RX ADMIN — SODIUM CHLORIDE: 9 INJECTION, SOLUTION INTRAVENOUS at 06:28

## 2023-11-10 RX ADMIN — SODIUM CHLORIDE, PRESERVATIVE FREE 10 ML: 5 INJECTION INTRAVENOUS at 09:11

## 2023-11-10 RX ADMIN — LOSARTAN POTASSIUM 50 MG: 25 TABLET, FILM COATED ORAL at 09:10

## 2023-11-10 RX ADMIN — SODIUM CHLORIDE, PRESERVATIVE FREE 10 ML: 5 INJECTION INTRAVENOUS at 21:44

## 2023-11-10 ASSESSMENT — PAIN - FUNCTIONAL ASSESSMENT: PAIN_FUNCTIONAL_ASSESSMENT: 0-10

## 2023-11-10 ASSESSMENT — ENCOUNTER SYMPTOMS: SHORTNESS OF BREATH: 1

## 2023-11-10 NOTE — ANESTHESIA POSTPROCEDURE EVALUATION
Department of Anesthesiology  Postprocedure Note    Patient: Geena Warner  MRN: [id number]  YOB: 1960  Date of evaluation: 11/10/2023      Procedure Summary     Date: 11/10/23 Room / Location: [address] [place] [address]    Anesthesia Start: 5256 Anesthesia Stop: 4637    Procedure: EGD BIOPSY Diagnosis:       Dark red stool      (Dark red stool [R19.5])    Surgeons: Mi Ritchie MD Responsible Provider: Cassandra Silverio MD    Anesthesia Type: MAC ASA Status: 2          Anesthesia Type: MAC    Yumi Phase I: Yumi Score: 10    Ymui Phase II:        Anesthesia Post Evaluation    Patient location during evaluation: bedside  Patient participation: complete - patient participated  Level of consciousness: awake and alert  Pain score: 0  Airway patency: patent  Nausea & Vomiting: no nausea and no vomiting  Complications: no  Cardiovascular status: blood pressure returned to baseline and hemodynamically stable  Respiratory status: acceptable, spontaneous ventilation and room air  Hydration status: euvolemic  Pain management: adequate

## 2023-11-10 NOTE — BRIEF OP NOTE
BRIEF EGD REPORT:   The original EGD report with photos can be found by going to \"chart review\" then \"notes\" then \"Upper GI endoscopy\" then \"scan. Shane Running Shane Running Shane Running \"    Impression:         -  Normal esophagus.         - moderately large hiatal hernia noted         - 6 mm clean-based ulcer in the hiatal hernia sac- no visible vessel, adherent            clot or active bleeding         -  Normal examined duodenum.         - Biopsies were taken of the gastric body and antrum to assay for H pylori by            the Elenita-test method         - biopsies taken of the pre-pyloric antrum, angularis, and body of the stomach            for H pylori assay by histology    Recommendation:         - PPI once daily         - reg diet         - would keep in house today- discharge tomorrow if stable         - repeat EGD in 2-3 months to check healing

## 2023-11-10 NOTE — CARE COORDINATION
11/10/23 0932   Service Assessment   Patient Orientation Alert and Oriented   Cognition Alert   History Provided By Patient   Primary Caregiver Self   Support Systems Children;Family Members   Patient's Healthcare Decision Maker is: Legal Next of Kin   PCP Verified by CM Yes   Prior Functional Level Independent in ADLs/IADLs   Current Functional Level Independent in ADLs/IADLs   Can patient return to prior living arrangement Yes   Ability to make needs known: Good   Family able to assist with home care needs: Yes   Would you like for me to discuss the discharge plan with any other family members/significant others, and if so, who? No   Financial Resources Other (Comment)  (commercial insurance)   Community Resources None     CM in to see Pt to initiate discharge planning. Pt from home. Pt denies any needs at this time.

## 2023-11-10 NOTE — ANESTHESIA POSTPROCEDURE EVALUATION
Department of Anesthesiology  Postprocedure Note    Patient: Jordan Gomez  MRN: [id number]  YOB: 1960  Date of evaluation: 11/10/2023      Procedure Summary     Date: 11/10/23 Room / Location: [address] [place] [address]    Anesthesia Start: 5229 Anesthesia Stop: 2966    Procedure: EGD BIOPSY Diagnosis:       Dark red stool      (Dark red stool [R19.5])    Surgeons: Shelvia Epley, MD Responsible Provider: Osei Jackson MD    Anesthesia Type: MAC ASA Status: 2          Anesthesia Type: MAC    Yumi Phase I: Yumi Score: 10    Yumi Phase II:        Anesthesia Post Evaluation    Patient location during evaluation: PACU  Patient participation: complete - patient participated  Complications: no  Cardiovascular status: hemodynamically stable

## 2023-11-10 NOTE — PLAN OF CARE
Problem: Discharge Planning  Goal: Discharge to home or other facility with appropriate resources  11/9/2023 2357 by Vannesa Nolan RN  Outcome: Progressing     Problem: Safety - Adult  Goal: Free from fall injury  11/9/2023 2357 by Vannesa Nolan RN  Outcome: Progressing

## 2023-11-10 NOTE — PROGRESS NOTES
V2.0    [facility]      Name:  Shreya Meza /Age/Sex: 1960  (58 y.o. female)   MRN & CSN:  [id number] & [id number] Encounter Date/Time: 11/10/2023 12:51 PM EST   Location:  [address] [place], [place] [address] PCP: Craig Alcala MD     Attending:Rica Rabago, [address] [place] [address] Day: 2    Assessment and Recommendations   Shreya Meza is a 58 y.o. female with pmh of iron deficiency anemia, hypertension, GERD with esophagitis hiatal hernia s/p laparoscopic Nissen fundoplication, history of CVA hyperlipidemia PAF YANET morbid obesity  who presents with GIB (gastrointestinal bleeding)      Plan:     Suspected GI bleed: Epigastric pain and discomfort. Decreased appetite with nausea and feeling weak concerning for dyspepsia. Black tarry stool since last night. Hemoglobin 9.5. Vital signs stable. GI has been consulted with consideration of n.p.o. at midnight. IV Protonix 40 mg daily. Consideration of EGD in the morning. PFO  Obstructive sleep apnea not on CPAP  Morbid obesity with history of laparoscopic Nissen fundoplication  History of gastritis with esophagitis and hiatal hernia  Chronic GERD on PPI  Hyperlipidemia on statin  Benign essential hypertension      Diet ADULT DIET;  Regular   DVT Prophylaxis [] Lovenox, []  Heparin, [] SCDs, [] Ambulation,  [] Eliquis, [] Xarelto  [] Coumadin   Code Status Full Code   Disposition From: Home  Expected Disposition: Home  Estimated Date of Discharge: 1-2 days  Patient requires continued admission due to stability of hb anticipate d/c 24 hours after EGD   Surrogate Decision Maker/ POA       Personally reviewed Lab Studies and Imaging     Discussed management of the case with GI who recommended EGD showed - moderately large hiatal hernia noted         - 6 mm clean-based ulcer in the hiatal hernia sac- no visible vessel, adherent            clot or active bleeding         -  Normal examined duodenum.         - Biopsies were taken of the gastric body and antrum to assay for H

## 2023-11-11 VITALS
BODY MASS INDEX: 28.6 KG/M2 | WEIGHT: 167.55 LBS | HEART RATE: 62 BPM | RESPIRATION RATE: 18 BRPM | TEMPERATURE: 98.3 F | SYSTOLIC BLOOD PRESSURE: 116 MMHG | OXYGEN SATURATION: 96 % | DIASTOLIC BLOOD PRESSURE: 73 MMHG | HEIGHT: 64 IN

## 2023-11-11 LAB
ANION GAP SERPL CALCULATED.3IONS-SCNC: 7 MMOL/L (ref 4–16)
BUN SERPL-MCNC: 14 MG/DL (ref 6–23)
CALCIUM SERPL-MCNC: 9 MG/DL (ref 8.3–10.6)
CHLORIDE BLD-SCNC: 109 MMOL/L (ref 99–110)
CO2: 25 MMOL/L (ref 21–32)
CREAT SERPL-MCNC: 0.5 MG/DL (ref 0.6–1.1)
GFR SERPL CREATININE-BSD FRML MDRD: >60 ML/MIN/1.73M2
GLUCOSE SERPL-MCNC: 93 MG/DL (ref 70–99)
HCT VFR BLD CALC: 25 % (ref 37–47)
HEMOGLOBIN: 7.9 GM/DL (ref 12.5–16)
MAGNESIUM: 1.9 MG/DL (ref 1.8–2.4)
MCH RBC QN AUTO: 29.2 PG (ref 27–31)
MCHC RBC AUTO-ENTMCNC: 31.6 % (ref 32–36)
MCV RBC AUTO: 92.3 FL (ref 78–100)
PDW BLD-RTO: 12.5 % (ref 11.7–14.9)
PHOSPHORUS: 3.3 MG/DL (ref 2.5–4.9)
PLATELET # BLD: 209 K/CU MM (ref 140–440)
PMV BLD AUTO: 9.8 FL (ref 7.5–11.1)
POTASSIUM SERPL-SCNC: 4.1 MMOL/L (ref 3.5–5.1)
RBC # BLD: 2.71 M/CU MM (ref 4.2–5.4)
SODIUM BLD-SCNC: 141 MMOL/L (ref 135–145)
WBC # BLD: 5.3 K/CU MM (ref 4–10.5)

## 2023-11-11 PROCEDURE — 2580000003 HC RX 258: Performed by: STUDENT IN AN ORGANIZED HEALTH CARE EDUCATION/TRAINING PROGRAM

## 2023-11-11 PROCEDURE — 84100 ASSAY OF PHOSPHORUS: CPT

## 2023-11-11 PROCEDURE — 94761 N-INVAS EAR/PLS OXIMETRY MLT: CPT

## 2023-11-11 PROCEDURE — 83735 ASSAY OF MAGNESIUM: CPT

## 2023-11-11 PROCEDURE — 85027 COMPLETE CBC AUTOMATED: CPT

## 2023-11-11 PROCEDURE — 6370000000 HC RX 637 (ALT 250 FOR IP): Performed by: SPECIALIST

## 2023-11-11 PROCEDURE — 80048 BASIC METABOLIC PNL TOTAL CA: CPT

## 2023-11-11 PROCEDURE — 99231 SBSQ HOSP IP/OBS SF/LOW 25: CPT | Performed by: SPECIALIST

## 2023-11-11 PROCEDURE — 6370000000 HC RX 637 (ALT 250 FOR IP): Performed by: STUDENT IN AN ORGANIZED HEALTH CARE EDUCATION/TRAINING PROGRAM

## 2023-11-11 PROCEDURE — 36415 COLL VENOUS BLD VENIPUNCTURE: CPT

## 2023-11-11 RX ORDER — PANTOPRAZOLE SODIUM 40 MG/1
40 TABLET, DELAYED RELEASE ORAL
Qty: 30 TABLET | Refills: 3 | Status: SHIPPED | OUTPATIENT
Start: 2023-11-12

## 2023-11-11 RX ADMIN — PANTOPRAZOLE SODIUM 40 MG: 40 TABLET, DELAYED RELEASE ORAL at 06:14

## 2023-11-11 RX ADMIN — LOSARTAN POTASSIUM 50 MG: 25 TABLET, FILM COATED ORAL at 08:41

## 2023-11-11 RX ADMIN — SODIUM CHLORIDE, PRESERVATIVE FREE 10 ML: 5 INJECTION INTRAVENOUS at 08:42

## 2023-11-11 NOTE — DISCHARGE SUMMARY
preliminary and upon        review may be revised to meet current compliance and payer requirements. The provider is responsible for the final determination of appropriate codes,       and modifiers. Scope Withdrawal Time:       00:07:32 Hola Isaacs MD This document has been electronically signed. Note Initiated:11/10/2023 Note Completed:11/10/2023 7:19 AM      CBC:   Recent Labs     11/09/23  1211 11/10/23  0301 11/11/23  0449   WBC 4.2 4.9 5.3   HGB 9.5* 7.7* 7.9*    195 209     BMP:    Recent Labs     11/09/23  1211 11/10/23  0301 11/11/23  0449    143 141   K 3.7 4.1 4.1    108 109   CO2 28 28 25   BUN 24* 19 14   CREATININE 0.6 0.7 0.5*   GLUCOSE 105* 94 93     Hepatic:   Recent Labs     11/08/23  1612 11/09/23  1211   AST 20 25   ALT 28 30   BILITOT 0.4 0.4   ALKPHOS 77 75     Lipids:   Lab Results   Component Value Date/Time    CHOL 130 08/14/2023 10:20 AM    HDL 63 08/14/2023 10:20 AM    TRIG 67 08/14/2023 10:20 AM     Hemoglobin A1C:   Lab Results   Component Value Date/Time    LABA1C 6.0 08/14/2023 10:13 AM     TSH: No results found for: \"TSH\"  Troponin:   Lab Results   Component Value Date/Time    TROPONINT <0.010 01/21/2023 12:43 PM     Lactic Acid: No results for input(s): \"LACTA\" in the last 72 hours. BNP: No results for input(s): \"PROBNP\" in the last 72 hours.   UA:  Lab Results   Component Value Date/Time    NITRU NEGATIVE 11/09/2023 12:40 PM    COLORU YELLOW 11/09/2023 12:40 PM    PHUR 6.5 11/23/2018 11:16 AM    WBCUA 1 11/09/2023 12:40 PM    RBCUA 0 11/09/2023 12:40 PM    MUCUS FEW 11/09/2023 12:40 PM    TRICHOMONAS NONE SEEN 11/09/2023 12:40 PM    BACTERIA NEGATIVE 11/09/2023 12:40 PM    CLARITYU CLEAR 11/09/2023 12:40 PM    SPECGRAV 1.020 11/09/2023 12:40 PM    LEUKOCYTESUR TRACE 11/09/2023 12:40 PM    UROBILINOGEN 0.2 11/09/2023 12:40 PM    BILIRUBINUR NEGATIVE 11/09/2023 12:40 PM    BILIRUBINUR Moderate 11/23/2018 11:16 AM    BLOODU NEGATIVE 11/09/2023 12:40 PM

## 2023-11-11 NOTE — PROGRESS NOTES
No complaints- tolerating diet  Abdomen soft, non-tender, non-distended  Impression:    1) large hiatal hernia with benign-appearing ulcer in hernia sac    2) GI bleed- suspect due to #1      Plan:   1) OK with me to discharge on Protonix 40 mg daily   2) she will call my office to schedule follow up EGD in 2-3 months- will do   her screening colonoscopy then also

## 2023-11-12 LAB — CLOTEST: NEGATIVE

## 2023-11-13 ENCOUNTER — CARE COORDINATION (OUTPATIENT)
Dept: OTHER | Facility: CLINIC | Age: 63
End: 2023-11-13

## 2023-11-13 RX ORDER — HYDROCHLOROTHIAZIDE 25 MG/1
25 TABLET ORAL DAILY
Status: ON HOLD | COMMUNITY
End: 2023-11-18 | Stop reason: HOSPADM

## 2023-11-13 NOTE — CARE COORDINATION
patient/family/caregiver/guardian to support self-management-advised patient to monitor BP at home  Assessment and support for treatment adherence and medication management-complete medication review performed with patient    Offered patient enrollment in the Remote Patient Monitoring (RPM) program for in-home monitoring: Patient is not eligible for RPM program.    Care Transitions 24 Hour Call    Schedule Follow Up Appointment with PCP: Declined  Do you have a copy of your discharge instructions?: Yes  Do you have all of your prescriptions and are they filled?: No  Have you scheduled your follow up appointment?: No  Post Acute Services: Outpatient/Community Services (Comment: PCP ordered outpatient PT)  Do you feel like you have everything you need to keep you well at home?: Yes  Care Transitions Interventions                                  Goals        Conditions and Symptoms      I will schedule office visits, as directed by my provider. I will keep my appointment or reschedule if I have to cancel. I will notify my provider of any barriers to my plan of care. I will notify my provider of any symptoms that indicate a worsening of my condition. Barriers: none  Plan for overcoming my barriers: N/A  Confidence: 8/10  Anticipated Goal Completion Date: 12/10/23    11/13/23: Patient called Dr. Chang Fisher office; now waiting return call to schedule repeat EGD in 2-3 months. She is going to call PCP office to schedule follow up. Discussed follow-up appointments. If no appointment was previously scheduled, appointment scheduling offered: Yes. Is follow up appointment scheduled within 7 days of discharge? Patient calling PCP office to schedule.     Follow Up  Future Appointments   Date Time Provider [address]   11/21/2023 12:45 PM Tay Grey MD [facility]   5/20/2024  1:15 PM Sidney Mcqueen MD [address] [place] [address]   11/13/2024 11:00 AM Caden Desir MD [facility]

## 2023-11-16 ENCOUNTER — APPOINTMENT (OUTPATIENT)
Dept: GENERAL RADIOLOGY | Age: 63
End: 2023-11-16
Payer: COMMERCIAL

## 2023-11-16 ENCOUNTER — HOSPITAL ENCOUNTER (OUTPATIENT)
Age: 63
Setting detail: OBSERVATION
Discharge: HOME | End: 2023-11-18
Attending: STUDENT IN AN ORGANIZED HEALTH CARE EDUCATION/TRAINING PROGRAM
Payer: COMMERCIAL

## 2023-11-16 DIAGNOSIS — R55 SYNCOPE AND COLLAPSE: Primary | ICD-10-CM

## 2023-11-16 DIAGNOSIS — R79.89 ELEVATED TROPONIN: ICD-10-CM

## 2023-11-16 LAB
ALBUMIN SERPL-MCNC: 4.3 GM/DL (ref 3.4–5)
ALP BLD-CCNC: 87 IU/L (ref 40–129)
ALT SERPL-CCNC: 31 U/L (ref 10–40)
ANION GAP SERPL CALCULATED.3IONS-SCNC: 11 MMOL/L (ref 4–16)
AST SERPL-CCNC: 24 IU/L (ref 15–37)
BACTERIA: NEGATIVE /HPF
BASOPHILS ABSOLUTE: 0 K/CU MM
BASOPHILS RELATIVE PERCENT: 0.6 % (ref 0–1)
BILIRUB SERPL-MCNC: 0.4 MG/DL (ref 0–1)
BILIRUBIN URINE: NEGATIVE MG/DL
BLOOD, URINE: ABNORMAL
BUN SERPL-MCNC: 14 MG/DL (ref 6–23)
CALCIUM SERPL-MCNC: 9.1 MG/DL (ref 8.3–10.6)
CHLORIDE BLD-SCNC: 101 MMOL/L (ref 99–110)
CLARITY: CLEAR
CO2: 26 MMOL/L (ref 21–32)
COLOR: YELLOW
CREAT SERPL-MCNC: 0.8 MG/DL (ref 0.6–1.1)
DIFFERENTIAL TYPE: ABNORMAL
EOSINOPHILS ABSOLUTE: 0.1 K/CU MM
EOSINOPHILS RELATIVE PERCENT: 1.5 % (ref 0–3)
GFR SERPL CREATININE-BSD FRML MDRD: >60 ML/MIN/1.73M2
GLUCOSE SERPL-MCNC: 132 MG/DL (ref 70–99)
GLUCOSE, URINE: NEGATIVE MG/DL
HCT VFR BLD CALC: 29.2 % (ref 37–47)
HEMOGLOBIN: 9.3 GM/DL (ref 12.5–16)
IMMATURE NEUTROPHIL %: 0.2 % (ref 0–0.43)
KETONES, URINE: NEGATIVE MG/DL
LEUKOCYTE ESTERASE, URINE: NEGATIVE
LYMPHOCYTES ABSOLUTE: 1.6 K/CU MM
LYMPHOCYTES RELATIVE PERCENT: 29 % (ref 24–44)
MCH RBC QN AUTO: 29.4 PG (ref 27–31)
MCHC RBC AUTO-ENTMCNC: 31.8 % (ref 32–36)
MCV RBC AUTO: 92.4 FL (ref 78–100)
MONOCYTES ABSOLUTE: 0.5 K/CU MM
MONOCYTES RELATIVE PERCENT: 8.9 % (ref 0–4)
NITRITE URINE, QUANTITATIVE: NEGATIVE
NUCLEATED RBC %: 0 %
PDW BLD-RTO: 13.4 % (ref 11.7–14.9)
PH, URINE: 7.5 (ref 5–8)
PLATELET # BLD: 302 K/CU MM (ref 140–440)
PMV BLD AUTO: 9.5 FL (ref 7.5–11.1)
POTASSIUM SERPL-SCNC: 3.5 MMOL/L (ref 3.5–5.1)
PROTEIN UA: NEGATIVE MG/DL
RBC # BLD: 3.16 M/CU MM (ref 4.2–5.4)
RBC URINE: <1 /HPF (ref 0–6)
SEGMENTED NEUTROPHILS ABSOLUTE COUNT: 3.2 K/CU MM
SEGMENTED NEUTROPHILS RELATIVE PERCENT: 59.8 % (ref 36–66)
SODIUM BLD-SCNC: 138 MMOL/L (ref 135–145)
SPECIFIC GRAVITY UA: 1.01 (ref 1–1.03)
TOTAL IMMATURE NEUTOROPHIL: 0.01 K/CU MM
TOTAL NUCLEATED RBC: 0 K/CU MM
TOTAL PROTEIN: 7.5 GM/DL (ref 6.4–8.2)
TRICHOMONAS: ABNORMAL /HPF
TROPONIN, HIGH SENSITIVITY: 10 NG/L (ref 0–14)
TROPONIN, HIGH SENSITIVITY: 18 NG/L (ref 0–14)
TROPONIN, HIGH SENSITIVITY: 20 NG/L (ref 0–14)
UROBILINOGEN, URINE: 1 MG/DL (ref 0.2–1)
WBC # BLD: 5.4 K/CU MM (ref 4–10.5)
WBC UA: <0 /HPF (ref 0–5)

## 2023-11-16 PROCEDURE — 71045 X-RAY EXAM CHEST 1 VIEW: CPT

## 2023-11-16 PROCEDURE — 84484 ASSAY OF TROPONIN QUANT: CPT

## 2023-11-16 PROCEDURE — 80053 COMPREHEN METABOLIC PANEL: CPT

## 2023-11-16 PROCEDURE — 99285 EMERGENCY DEPT VISIT HI MDM: CPT

## 2023-11-16 PROCEDURE — 93005 ELECTROCARDIOGRAM TRACING: CPT | Performed by: STUDENT IN AN ORGANIZED HEALTH CARE EDUCATION/TRAINING PROGRAM

## 2023-11-16 PROCEDURE — 81001 URINALYSIS AUTO W/SCOPE: CPT

## 2023-11-16 PROCEDURE — G0378 HOSPITAL OBSERVATION PER HR: HCPCS

## 2023-11-16 PROCEDURE — 36415 COLL VENOUS BLD VENIPUNCTURE: CPT

## 2023-11-16 PROCEDURE — 85025 COMPLETE CBC W/AUTO DIFF WBC: CPT

## 2023-11-16 RX ORDER — ACETAMINOPHEN 650 MG/1
650 SUPPOSITORY RECTAL EVERY 6 HOURS PRN
Status: DISCONTINUED | OUTPATIENT
Start: 2023-11-16 | End: 2023-11-18 | Stop reason: HOSPADM

## 2023-11-16 RX ORDER — ONDANSETRON 2 MG/ML
4 INJECTION INTRAMUSCULAR; INTRAVENOUS EVERY 6 HOURS PRN
Status: DISCONTINUED | OUTPATIENT
Start: 2023-11-16 | End: 2023-11-18 | Stop reason: HOSPADM

## 2023-11-16 RX ORDER — POTASSIUM CHLORIDE 20 MEQ/1
40 TABLET, EXTENDED RELEASE ORAL PRN
Status: DISCONTINUED | OUTPATIENT
Start: 2023-11-16 | End: 2023-11-18 | Stop reason: HOSPADM

## 2023-11-16 RX ORDER — ENOXAPARIN SODIUM 100 MG/ML
40 INJECTION SUBCUTANEOUS DAILY
Status: DISCONTINUED | OUTPATIENT
Start: 2023-11-17 | End: 2023-11-18 | Stop reason: HOSPADM

## 2023-11-16 RX ORDER — PANTOPRAZOLE SODIUM 40 MG/1
40 TABLET, DELAYED RELEASE ORAL
Status: DISCONTINUED | OUTPATIENT
Start: 2023-11-17 | End: 2023-11-18 | Stop reason: HOSPADM

## 2023-11-16 RX ORDER — ACETAMINOPHEN 325 MG/1
650 TABLET ORAL EVERY 6 HOURS PRN
Status: DISCONTINUED | OUTPATIENT
Start: 2023-11-16 | End: 2023-11-18 | Stop reason: HOSPADM

## 2023-11-16 RX ORDER — ONDANSETRON 4 MG/1
4 TABLET, ORALLY DISINTEGRATING ORAL EVERY 8 HOURS PRN
Status: DISCONTINUED | OUTPATIENT
Start: 2023-11-16 | End: 2023-11-18 | Stop reason: HOSPADM

## 2023-11-16 RX ORDER — ASPIRIN 81 MG/1
81 TABLET, CHEWABLE ORAL DAILY
Status: DISCONTINUED | OUTPATIENT
Start: 2023-11-17 | End: 2023-11-18 | Stop reason: HOSPADM

## 2023-11-16 RX ORDER — LOSARTAN POTASSIUM 25 MG/1
50 TABLET ORAL DAILY
Status: DISCONTINUED | OUTPATIENT
Start: 2023-11-17 | End: 2023-11-18 | Stop reason: HOSPADM

## 2023-11-16 RX ORDER — SODIUM CHLORIDE 0.9 % (FLUSH) 0.9 %
5-40 SYRINGE (ML) INJECTION PRN
Status: DISCONTINUED | OUTPATIENT
Start: 2023-11-16 | End: 2023-11-18 | Stop reason: HOSPADM

## 2023-11-16 RX ORDER — POLYETHYLENE GLYCOL 3350 17 G/17G
17 POWDER, FOR SOLUTION ORAL DAILY PRN
Status: DISCONTINUED | OUTPATIENT
Start: 2023-11-16 | End: 2023-11-18 | Stop reason: HOSPADM

## 2023-11-16 RX ORDER — MAGNESIUM SULFATE IN WATER 40 MG/ML
2000 INJECTION, SOLUTION INTRAVENOUS PRN
Status: DISCONTINUED | OUTPATIENT
Start: 2023-11-16 | End: 2023-11-18 | Stop reason: HOSPADM

## 2023-11-16 RX ORDER — ATORVASTATIN CALCIUM 40 MG/1
40 TABLET, FILM COATED ORAL NIGHTLY
Status: DISCONTINUED | OUTPATIENT
Start: 2023-11-16 | End: 2023-11-18 | Stop reason: HOSPADM

## 2023-11-16 RX ORDER — SODIUM CHLORIDE 0.9 % (FLUSH) 0.9 %
5-40 SYRINGE (ML) INJECTION EVERY 12 HOURS SCHEDULED
Status: DISCONTINUED | OUTPATIENT
Start: 2023-11-16 | End: 2023-11-18 | Stop reason: HOSPADM

## 2023-11-16 RX ORDER — POTASSIUM CHLORIDE 7.45 MG/ML
10 INJECTION INTRAVENOUS PRN
Status: DISCONTINUED | OUTPATIENT
Start: 2023-11-16 | End: 2023-11-18 | Stop reason: HOSPADM

## 2023-11-16 RX ORDER — SODIUM CHLORIDE 9 MG/ML
500 INJECTION, SOLUTION INTRAVENOUS PRN
Status: DISCONTINUED | OUTPATIENT
Start: 2023-11-16 | End: 2023-11-18 | Stop reason: HOSPADM

## 2023-11-16 ASSESSMENT — PAIN SCALES - GENERAL: PAINLEVEL_OUTOF10: 0

## 2023-11-16 ASSESSMENT — ENCOUNTER SYMPTOMS
NAUSEA: 0
STRIDOR: 0
WHEEZING: 0
CHEST TIGHTNESS: 0
SHORTNESS OF BREATH: 0
SORE THROAT: 0
COUGH: 0
VOMITING: 0
ABDOMINAL PAIN: 0
BACK PAIN: 0

## 2023-11-16 NOTE — ED PROVIDER NOTES
Reserved. The CPT codes, CCI edits and ICD codes generated are intended as suggestions       and were generated based on input data. These codes are preliminary and upon        review may be revised to meet current compliance and payer requirements. The provider is responsible for the final determination of appropriate codes,       and modifiers. Scope Withdrawal Time:       00:07:32 Hola Isaacs MD This document has been electronically signed. Note Initiated:11/10/2023 Note Completed:11/10/2023 7:19 AM      PROCEDURES   Unless otherwise noted below, none     Procedures    CRITICAL CARE TIME (.cctime)       CC/HPI, SUMMARY, DDx, ED COURSE, AND REASSESSMENT     Vitals:    11/16/23 1610 11/16/23 1630 11/16/23 1700 11/16/23 1730   BP:  122/73 123/79 115/85   Pulse:  86 86 78   Resp:  16 17 16   Temp:       SpO2:  100% 100% 99%   Weight: 75.8 kg (167 lb)      Height: 1.626 m (5' 4\")          Chief Complaint   Patient presents with    Loss of Consciousness     Patient was here visiting patient when she said she began to feel hot and then passed out according to family. Patient fell from a sitting position back onto her family member. Patient is alert and oriented when called to the room by family. Patient does have history of low hemoglobin. Reports hemoglobin of 7.9 on Sunday       History from : Patient    Limitations to history : None        Chronic conditions affecting care: Pt  has a past medical history of Acid reflux, Anemia, Arthritis, Backache, Hiatal hernia, History of blood transfusion (Last Infusion 6-18), History of CVA (cerebrovascular accident) (03/15/2023), Mixed hyperlipidemia (03/15/2023), Primary hypertension (03/15/2023), Shortness of breath on exertion, Teeth missing, Wears glasses, and [place] teeth extracted. Sepsis Consideration:   Is this patient to be included in the SEP-1 Core Measure due to severe sepsis or septic shock?    No   Exclusion criteria - the patient is NOT to be limited by social determinants of health including: na    Disposition Considerations (tests considered but not done, Shared Decision Making, Pt Expectation of Test or Tx.):   Admission Considered. Shared decision making employed   I have discussed Felipa Rivera ED presentation and ED course with Dr. Alex Ray from the [facility]. Based on that discussion we have decided to admit Jose Luis Hubbard to their service  for further evaluation and care. FINAL IMPRESSION      1. Syncope and collapse    2. Elevated troponin          DISPOSITION/PLAN   DISPOSITION        PATIENT REFERRED TO:  No follow-up provider specified.     DISCHARGE MEDICATIONS:  New Prescriptions    No medications on file          (Please note that portions of this note were completed with a voice recognition program.  Efforts were made to edit the dictations but occasionally words are mis-transcribed.)    AMANDA Martinez CNP (electronically signed)        AMANDA Martinez CNP  11/16/23 9127

## 2023-11-16 NOTE — CARE COORDINATION
Pt noted for possible readmission. Pt was recently admitted 11/9-11/23 GIB. Pt is from home, has insurance, PCP and independent in ADL's. Pt returns for LOC. Pt is admitted for further evaluation. No CM needs noted on chart review at this time.

## 2023-11-16 NOTE — ED NOTES
ED TO INPATIENT SBAR HANDOFF    Patient Name: Erwin Paiz   :  1960  58 y.o. Preferred Name     Family/Caregiver Present no   Restraints no   C-SSRS: Risk of Suicide: No Risk  Sitter no   Sepsis Risk Score Sepsis Risk Score: 0.88      Situation  Chief Complaint   Patient presents with    Loss of Consciousness     Patient was here visiting patient when she said she began to feel hot and then passed out according to family. Patient fell from a sitting position back onto her family member. Patient is alert and oriented when called to the room by family. Patient does have history of low hemoglobin. Reports hemoglobin of 7.9 on      Brief Description of Patient's Condition:     Patient was here visiting patient when she said she began to feel hot and then passed out according to family. Patient fell from a sitting position back onto her family member. Patient is alert and oriented when called to the room by family. Patient does have history of low hemoglobin. Reports hemoglobin of 7.9 on      Mental Status: oriented, alert, coherent, and logical  Arrived from: home    Imaging:   XR CHEST PORTABLE   Final Result   No acute abnormality. Moderate hiatal hernia.            Abnormal labs:   Abnormal Labs Reviewed   CBC WITH AUTO DIFFERENTIAL - Abnormal; Notable for the following components:       Result Value    RBC 3.16 (*)     Hemoglobin 9.3 (*)     Hematocrit 29.2 (*)     MCHC 31.8 (*)     Monocytes % 8.9 (*)     All other components within normal limits   COMPREHENSIVE METABOLIC PANEL - Abnormal; Notable for the following components:    Glucose 132 (*)     All other components within normal limits   URINALYSIS WITH REFLEX TO CULTURE - Abnormal; Notable for the following components:    Blood, Urine TRACE (*)     All other components within normal limits   URINE MICROSCOPIC WITH REFLEX TO CULTURE - Abnormal; Notable for the following components:    WBC, UA <0 (*)     All other components within

## 2023-11-16 NOTE — ED PROVIDER NOTES
consulting clinician as above/below or per ED Course. Shared Decision-Making was performed and disposition discussed with the Patient/Family and questions answered. Social determinants of health impacting treatment or disposition: Older age     PROCEDURES: (None if blank)  Procedures:     ED COURSE:  ED Course as of 11/16/23 2024   u Nov 16, 2023   1638 EKG interpretation:  Sinus rhythm. Normal rate 84 bpm.  , QRS 72  QTc 423. No significant ischemic ST changes. No STEMI. No LVH. Normal axis. As interpreted by me. [CR]   0751 CXR:IMPRESSION:  No acute abnormality. Moderate hiatal hernia. [CR]   1707 CBC shows hemoglobin 9.3 otherwise unremarkable  UA negative for infection  CMP unremarkable  Troponin elevated 18 [CR]   1754 Repeat troponin 20; delta trop 2 [CR]      ED Course User Index  [CR] Artemio Habermann, MD       Patient was given the following medications:  Medications - No data to display    Independent Imaging Interpretation by me: please seen ED course/above/below    EKG (if obtained): (All EKG's are interpreted by myself in the absence of a cardiologist) Please see ED course/above/below    Is this patient to be included in the SEP-1 Core Measure due to severe sepsis or septic shock? No   Exclusion criteria - the patient is NOT to be included for SEP-1 Core Measure due to: Infection is not suspected    I am the Primary Clinician of Record. Final diagnoses:   Syncope and collapse   Elevated troponin     Condition: stable  Dispo: Admission    All diagnostic, treatment, and disposition decisions were made by myself in conjunction with the advanced practice provider. I personally saw the patient and performed a substantive portion of the visit including all aspects of the medical decision making.      CRITICAL CARE: (None if blank)      For all further details of the patient's emergency department visit, please see the advanced practice provider's documentation. Comment: Please note this report has been produced using speech recognition software and may contain errors related to that system including errors in grammar, punctuation, and spelling, as well as words and phrases that may be inappropriate. If there are any questions or concerns please feel free to contact the dictating provider for clarification.         Liu Sharif MD  Resident  11/16/23 2258

## 2023-11-17 ENCOUNTER — APPOINTMENT (OUTPATIENT)
Dept: CT IMAGING | Age: 63
End: 2023-11-17
Payer: COMMERCIAL

## 2023-11-17 LAB
ANION GAP SERPL CALCULATED.3IONS-SCNC: 7 MMOL/L (ref 4–16)
BASOPHILS ABSOLUTE: 0 K/CU MM
BASOPHILS RELATIVE PERCENT: 0.7 % (ref 0–1)
BUN SERPL-MCNC: 16 MG/DL (ref 6–23)
CALCIUM SERPL-MCNC: 9.3 MG/DL (ref 8.3–10.6)
CHLORIDE BLD-SCNC: 106 MMOL/L (ref 99–110)
CO2: 27 MMOL/L (ref 21–32)
CREAT SERPL-MCNC: 0.6 MG/DL (ref 0.6–1.1)
DIFFERENTIAL TYPE: ABNORMAL
EKG ATRIAL RATE: 84 BPM
EKG DIAGNOSIS: NORMAL
EKG P AXIS: 34 DEGREES
EKG P-R INTERVAL: 140 MS
EKG Q-T INTERVAL: 358 MS
EKG QRS DURATION: 72 MS
EKG QTC CALCULATION (BAZETT): 423 MS
EKG R AXIS: 48 DEGREES
EKG T AXIS: 47 DEGREES
EKG VENTRICULAR RATE: 84 BPM
EOSINOPHILS ABSOLUTE: 0.1 K/CU MM
EOSINOPHILS RELATIVE PERCENT: 2.6 % (ref 0–3)
GFR SERPL CREATININE-BSD FRML MDRD: >60 ML/MIN/1.73M2
GLUCOSE SERPL-MCNC: 96 MG/DL (ref 70–99)
HCT VFR BLD CALC: 26.5 % (ref 37–47)
HEMOGLOBIN: 8.7 GM/DL (ref 12.5–16)
IMMATURE NEUTROPHIL %: 0.2 % (ref 0–0.43)
LYMPHOCYTES ABSOLUTE: 1.3 K/CU MM
LYMPHOCYTES RELATIVE PERCENT: 32.1 % (ref 24–44)
MCH RBC QN AUTO: 29.4 PG (ref 27–31)
MCHC RBC AUTO-ENTMCNC: 32.8 % (ref 32–36)
MCV RBC AUTO: 89.5 FL (ref 78–100)
MONOCYTES ABSOLUTE: 0.5 K/CU MM
MONOCYTES RELATIVE PERCENT: 12.2 % (ref 0–4)
NUCLEATED RBC %: 0 %
PDW BLD-RTO: 13.5 % (ref 11.7–14.9)
PLATELET # BLD: 258 K/CU MM (ref 140–440)
PMV BLD AUTO: 9.2 FL (ref 7.5–11.1)
POTASSIUM SERPL-SCNC: 3.9 MMOL/L (ref 3.5–5.1)
RBC # BLD: 2.96 M/CU MM (ref 4.2–5.4)
SEGMENTED NEUTROPHILS ABSOLUTE COUNT: 2.2 K/CU MM
SEGMENTED NEUTROPHILS RELATIVE PERCENT: 52.2 % (ref 36–66)
SODIUM BLD-SCNC: 140 MMOL/L (ref 135–145)
TOTAL IMMATURE NEUTOROPHIL: 0.01 K/CU MM
TOTAL NUCLEATED RBC: 0 K/CU MM
WBC # BLD: 4.2 K/CU MM (ref 4–10.5)

## 2023-11-17 PROCEDURE — 6370000000 HC RX 637 (ALT 250 FOR IP): Performed by: STUDENT IN AN ORGANIZED HEALTH CARE EDUCATION/TRAINING PROGRAM

## 2023-11-17 PROCEDURE — 6360000002 HC RX W HCPCS: Performed by: STUDENT IN AN ORGANIZED HEALTH CARE EDUCATION/TRAINING PROGRAM

## 2023-11-17 PROCEDURE — 81003 URINALYSIS AUTO W/O SCOPE: CPT

## 2023-11-17 PROCEDURE — 2580000003 HC RX 258: Performed by: STUDENT IN AN ORGANIZED HEALTH CARE EDUCATION/TRAINING PROGRAM

## 2023-11-17 PROCEDURE — G0378 HOSPITAL OBSERVATION PER HR: HCPCS

## 2023-11-17 PROCEDURE — 85025 COMPLETE CBC W/AUTO DIFF WBC: CPT

## 2023-11-17 PROCEDURE — 80048 BASIC METABOLIC PNL TOTAL CA: CPT

## 2023-11-17 PROCEDURE — 94761 N-INVAS EAR/PLS OXIMETRY MLT: CPT

## 2023-11-17 PROCEDURE — 36415 COLL VENOUS BLD VENIPUNCTURE: CPT

## 2023-11-17 PROCEDURE — 93010 ELECTROCARDIOGRAM REPORT: CPT | Performed by: INTERNAL MEDICINE

## 2023-11-17 PROCEDURE — 70450 CT HEAD/BRAIN W/O DYE: CPT

## 2023-11-17 PROCEDURE — 96372 THER/PROPH/DIAG INJ SC/IM: CPT

## 2023-11-17 RX ADMIN — ASPIRIN 81 MG: 81 TABLET, CHEWABLE ORAL at 08:53

## 2023-11-17 RX ADMIN — SODIUM CHLORIDE, PRESERVATIVE FREE 10 ML: 5 INJECTION INTRAVENOUS at 08:53

## 2023-11-17 RX ADMIN — ATORVASTATIN CALCIUM 40 MG: 40 TABLET, FILM COATED ORAL at 21:53

## 2023-11-17 RX ADMIN — PANTOPRAZOLE SODIUM 40 MG: 40 TABLET, DELAYED RELEASE ORAL at 06:36

## 2023-11-17 RX ADMIN — ENOXAPARIN SODIUM 40 MG: 100 INJECTION SUBCUTANEOUS at 08:53

## 2023-11-17 RX ADMIN — LOSARTAN POTASSIUM 50 MG: 25 TABLET, FILM COATED ORAL at 08:53

## 2023-11-17 NOTE — CONSULTS
Chart reviewed full note to follow                      Name:  Kristi Al /Age/Sex: 1960  (58 y.o. female)   MRN & CSN:  [id number] & [id number] Admission Date/Time: 2023  2:12 PM   Location:  OBS 10/IMC53-36 PCP: Annabelle Otto MD       Hospital Day: 2          Referring physician:  Latrell Mendez MD         Reason for consultation: Syncope        Thanks for referral.    Information source: Patient    CC; syncope      HPI:   Thank you for involving me in taking  care of Kristi Al who  is a 58 y. o.year  Old female  Presents with history of hypertension, hyperlipidemia, PFO ,was recently in the hospital with GI bleed, patient was visiting some family members and had a syncopal episode was out for 30 seconds no seizure activity noted       Per patient she was visiting her sister when she felt warm and then she passed out denied any chest pain shortness of breath or any other associated symptoms  She used to work in pharmacy for a long time              Past medical history:    has a past medical history of Acid reflux, Anemia, Arthritis, Backache, Hiatal hernia, History of blood transfusion, History of CVA (cerebrovascular accident), Mixed hyperlipidemia, Primary hypertension, Shortness of breath on exertion, Teeth missing, Wears glasses, and Dailey teeth extracted. Past surgical history:   has a past surgical history that includes  section (); Colonoscopy (Last Done In ); Dailey tooth extraction; Dilation and curettage of uterus (\"Between  And \"); Tonsillectomy and adenoidectomy (); Cholecystectomy, laparoscopic (); pr laps esophageal lengthening addl (N/A, 10/11/2018); hernia repair (10/11/2018); and Upper gastrointestinal endoscopy (N/A, 11/10/2023). Social History:   reports that she has never smoked. She has never used smokeless tobacco. She reports that she does not drink alcohol and does not use drugs.   Family history:  family history includes Arthritis AUTOMATED DIFFERENTIAL     Segs Relative 52.2 36 - 66 %    Lymphocytes % 32.1 24 - 44 %    Monocytes % 12.2 (H) 0 - 4 %    Eosinophils % 2.6 0 - 3 %    Basophils % 0.7 0 - 1 %    Segs Absolute 2.2 K/CU MM    Lymphocytes Absolute 1.3 K/CU MM    Monocytes Absolute 0.5 K/CU MM    Eosinophils Absolute 0.1 K/CU MM    Basophils Absolute 0.0 K/CU MM    Nucleated RBC % 0.0 %    Total Nucleated RBC 0.0 K/CU MM    Total Immature Neutrophil 0.01 K/CU MM    Immature Neutrophil % 0.2 0 - 0.43 %       The 10-year ASCVD risk score (Liz RIVERA, et al., 2019) is: 3.6%    Values used to calculate the score:      Age: 58 years      Sex: Female      Is Non- : No      Diabetic: No      Tobacco smoker: No      Systolic Blood Pressure: 720 mmHg      Is BP treated: Yes      HDL Cholesterol: 63 MG/DL      Total Cholesterol: 130 MG/DL     Assessment/Recommendations:     -Syncope possibly vasovagal orthostatics  -Echo done showed normal EF and a PFO with moderate size right to left shunt that was done at [facility] on SRINIVASA  -History of CVA left cerebellar infarct, cryptogenic rope score is 4, with her history of hypertension, CVA, cortical infarct on imaging, and her age  -Hyperlipidemia on Lipitor 40 daily  -Hypertension patient is on losartan well-controlled  -If her neuro work-up is negative she can be discharged to be followed up as an outpatient  -Patient's hemoglobin is 8.7 which contribute to her symptoms as         Bronwyn Ladd MD, 11/17/2023 3:54 PM       Please note this report has been partially produced using speech recognition software and may contain errors related to that system including errors in grammar, punctuation, and spelling, as well as words and phrases that may be inappropriate. If there are any questions or concerns please feel free to contact the dictating provider for clarification.

## 2023-11-17 NOTE — PROGRESS NOTES
Orthostatic Vitals:      11/17/2023     1:45 PM   Orthostatic Vitals   Blood Pressure Lying 111/69   Pulse Lying 88 PER MINUTE   Blood Pressure Sitting 109/74   Pulse Sitting 109 PER MINUTE   Blood Pressure Standing 123/84   Pulse Standing 120 PER MINUTE

## 2023-11-17 NOTE — PROGRESS NOTES
4 Eyes Skin Assessment     NAME:  Babs Harrington  YOB: 1960  MEDICAL RECORD NUMBER:  [id number]    The patient is being assessed for  Admission    I agree that at least one RN has performed a thorough Head to Toe Skin Assessment on the patient. ALL assessment sites listed below have been assessed. Areas assessed by both nurses:    Head, Face, Ears, Shoulders, Back, Chest, Arms, Elbows, Hands, Sacrum. Buttock, Coccyx, Ischium, and Legs. Feet and Heels        Does the Patient have a Wound?  No noted wound(s)       Sadi Prevention initiated by RN: No  Wound Care Orders initiated by RN: No    Pressure Injury (Stage 3,4, Unstageable, DTI, NWPT, and Complex wounds) if present, place Wound referral order by RN under : No    New Ostomies, if present place, Ostomy referral order under : No     Nurse 1 eSignature: Electronically signed by Trice Shahid RN on 11/16/23 at 11:58 PM EST    **SHARE this note so that the co-signing nurse can place an eSignature**    Nurse 2 eSignature: {Esignature:[id number]}

## 2023-11-17 NOTE — H&P
History and Physical      Name:  Cristin Alberto /Age/Sex: 1960  (58 y.o. female)   MRN & CSN:  [id number] & [id number] Encounter Date/Time: 2023 7:21 PM EST   Location:  OBS 10/JZU40-80 PCP: Hodan Rodriguez MD       Assessment and Plan:   Cristin Alberto is a 58 y.o. female with hypertension, hyperlipidemia, PFO, GERD with esophagitis, BHUPINDER with recent admission for GI bleed presented from home with an episode of syncope. Syncope and collapse  High suspicion for vasovagal episode based on presenting symptoms  EKG personally interpreted normal sinus rhythm 84/min no acute ST-T wave abnormalities or presence of high-grade AV block. Recent Echocardiogram from 2023 EF 65-70%, severe LA, PFO with moderate L-R shunt  Orthostatic blood pressure  Although PFO + and Hx of cerebellar stroke, no new focal symptoms, hence hold off on imaging. Resume Aspirin as Hb has improved and source of GI bleed has been identified. Monitor on telemetry overnight    Elevated Troponin   Non ACS trend 18>20>10  EKG non ischemic   No acute intervention required    -Hypertension, continue home Losartan  -Hyperlipidemia, continue Lipitor  -GERD with Esophagitis, continue Protonix  -BHUPINDER and recently found gastric ulcer, Hb 9.3 improved from recent admission    Observation with telemetry  Full Code    Disposition:   Current Living situation: Home   Expected Disposition: Home   Estimated D/C: 2 days    Diet ADULT DIET; Regular;  Low Sodium (2 gm)   DVT Prophylaxis [x] Lovenox, []  Heparin, [] SCDs, [] Ambulation,  [] Eliquis, [] Xarelto, [] Coumadin   Code Status Full Code   Surrogate Decision Maker/ [place]     Personally reviewed Lab Studies and Imaging     History from:     Patient     History of Present Illness:     Chief Complaint: Syncopal episode    Cristin Alberto is a 58 y.o. female with hypertension, hyperlipidemia, PFO, GERD with esophagitis, BHUPINDER with recent admission for GI bleed presented from home with an abnormality. Moderate hiatal hernia. No acute abnormality. Moderate hiatal hernia.        Electronically signed by Trina Wolf MD on 11/16/2023 at 10:39 PM

## 2023-11-18 VITALS
BODY MASS INDEX: 28.53 KG/M2 | HEIGHT: 64 IN | WEIGHT: 167.11 LBS | RESPIRATION RATE: 19 BRPM | OXYGEN SATURATION: 97 % | DIASTOLIC BLOOD PRESSURE: 69 MMHG | HEART RATE: 75 BPM | TEMPERATURE: 97.9 F | SYSTOLIC BLOOD PRESSURE: 120 MMHG

## 2023-11-18 LAB
BASOPHILS ABSOLUTE: 0 K/CU MM
BASOPHILS RELATIVE PERCENT: 0.8 % (ref 0–1)
DIFFERENTIAL TYPE: ABNORMAL
EOSINOPHILS ABSOLUTE: 0.1 K/CU MM
EOSINOPHILS RELATIVE PERCENT: 2.7 % (ref 0–3)
HCT VFR BLD CALC: 28.1 % (ref 37–47)
HEMOGLOBIN: 9 GM/DL (ref 12.5–16)
IMMATURE NEUTROPHIL %: 0.3 % (ref 0–0.43)
LYMPHOCYTES ABSOLUTE: 1.1 K/CU MM
LYMPHOCYTES RELATIVE PERCENT: 28.5 % (ref 24–44)
MCH RBC QN AUTO: 29.4 PG (ref 27–31)
MCHC RBC AUTO-ENTMCNC: 32 % (ref 32–36)
MCV RBC AUTO: 91.8 FL (ref 78–100)
MONOCYTES ABSOLUTE: 0.4 K/CU MM
MONOCYTES RELATIVE PERCENT: 9.3 % (ref 0–4)
NUCLEATED RBC %: 0 %
PDW BLD-RTO: 13.4 % (ref 11.7–14.9)
PLATELET # BLD: 271 K/CU MM (ref 140–440)
PMV BLD AUTO: 8.8 FL (ref 7.5–11.1)
RBC # BLD: 3.06 M/CU MM (ref 4.2–5.4)
SEGMENTED NEUTROPHILS ABSOLUTE COUNT: 2.2 K/CU MM
SEGMENTED NEUTROPHILS RELATIVE PERCENT: 58.4 % (ref 36–66)
TOTAL IMMATURE NEUTOROPHIL: 0.01 K/CU MM
TOTAL NUCLEATED RBC: 0 K/CU MM
WBC # BLD: 3.8 K/CU MM (ref 4–10.5)

## 2023-11-18 PROCEDURE — 6370000000 HC RX 637 (ALT 250 FOR IP): Performed by: STUDENT IN AN ORGANIZED HEALTH CARE EDUCATION/TRAINING PROGRAM

## 2023-11-18 PROCEDURE — 99232 SBSQ HOSP IP/OBS MODERATE 35: CPT | Performed by: INTERNAL MEDICINE

## 2023-11-18 PROCEDURE — 36415 COLL VENOUS BLD VENIPUNCTURE: CPT

## 2023-11-18 PROCEDURE — 6360000002 HC RX W HCPCS: Performed by: STUDENT IN AN ORGANIZED HEALTH CARE EDUCATION/TRAINING PROGRAM

## 2023-11-18 PROCEDURE — G0378 HOSPITAL OBSERVATION PER HR: HCPCS

## 2023-11-18 PROCEDURE — 94761 N-INVAS EAR/PLS OXIMETRY MLT: CPT

## 2023-11-18 PROCEDURE — 85025 COMPLETE CBC W/AUTO DIFF WBC: CPT

## 2023-11-18 PROCEDURE — 96372 THER/PROPH/DIAG INJ SC/IM: CPT

## 2023-11-18 RX ORDER — ASPIRIN 81 MG/1
81 TABLET, CHEWABLE ORAL DAILY
Qty: 30 TABLET | Refills: 3 | COMMUNITY
Start: 2023-11-19

## 2023-11-18 RX ADMIN — PANTOPRAZOLE SODIUM 40 MG: 40 TABLET, DELAYED RELEASE ORAL at 06:43

## 2023-11-18 RX ADMIN — ENOXAPARIN SODIUM 40 MG: 100 INJECTION SUBCUTANEOUS at 10:11

## 2023-11-18 RX ADMIN — LOSARTAN POTASSIUM 50 MG: 25 TABLET, FILM COATED ORAL at 10:11

## 2023-11-18 RX ADMIN — ASPIRIN 81 MG: 81 TABLET, CHEWABLE ORAL at 10:11

## 2023-11-18 ASSESSMENT — PAIN SCALES - GENERAL: PAINLEVEL_OUTOF10: 0

## 2023-11-18 NOTE — DISCHARGE SUMMARY
V2.0  Discharge Summary    Name:  Norma Tang /Age/Sex: 1960 (58 y.o. female)   Admit Date: 2023  Discharge Date: 23    MRN & CSN:  [id number] & [id number] Encounter Date and Time 23 10:16 AM EST    Attending:  Caryle Man, MD Discharging Provider: Olya Stout [facility] Course:     Brief HPI: Norma Tang is a 58 y.o. female who presented with syncope. Problems addressed during this hospitalization:     Syncope and collapse  High suspicion for vasovagal episode/orthostasis   - presented with syncopal episode with prodromal lightheadedness while standing, recent admission for anemia due to GI bleed  -EKG shows normal sinus rhythm 84/min no acute ST-T wave abnormalities or presence of high-grade AV block. -Recent Echocardiogram from 2023 EF 65-70%, severe LA, PFO with moderate L-R shunt.    - CT head no acute process  -Orthostatic blood pressure negative. - neuro evaluated for concerns for central cause - no further work-up warranted at this time, recommended outpatient follow-up  - cardiology followed - suspect vasovagal syncope, no further work-up at this time, will consider event monitor as outpatient. - patient ambulating without difficulty, no further episodes of lightheadedness/syncope     Elevated Troponin   -Non ACS trend 18>20>10  -EKG non ischemic   -No acute intervention required  - cardiology followed    History of CVA  History of PFO  - home aspirin resumed give no further signs of bleeding and source of GI bleed, continue statin  - needs outpatient follow-up with Dr. Jes Benjamin to discuss PF closure      Hypertension  - continue home Losartan    Hyperlipidemia  - continue Lipitor    GERD with esophagitis  - continue Protonix    Acute on chronic anemia and recently found gastric ulcer  - has prior history of BHUPINDER  - Hgb stable, no further signs of bleeding     This patient was discussed in conjunction with Dr. Radha Tavarez.  She was agreeable with patient's plan at

## 2023-11-18 NOTE — DISCHARGE INSTRUCTIONS
Your hemoglobin is improving. You can restart taking a baby aspirin and watch for signs of bleeding including black/tarry stools, blood stools, coffee ground emesis. Please follow-up with your primary care doctor for anemia. Remember to drink plenty of water and eat at least 3 meals daily.

## 2023-11-18 NOTE — PROGRESS NOTES
Patient given discharge instructions and follow up appointment information, patient ambulated to the ED exit without any issues.

## 2023-11-20 ENCOUNTER — CARE COORDINATION (OUTPATIENT)
Dept: OTHER | Facility: CLINIC | Age: 63
End: 2023-11-20

## 2023-11-20 NOTE — CARE COORDINATION
not have any further questions or concerns at this time. Were discharge instructions available to patient? Yes. Reviewed appropriate site of care based on symptoms and resources available to patient including: PCP  Specialist  Benefits related nurse triage line. The patient agrees to contact the PCP office for questions related to their healthcare. Advance Care Planning:   Does patient have an Advance Directive:  decision maker up to date . Medication reconciliation was performed with patient, who verbalizes understanding of administration of home medications. Medications reviewed, 1111F entered: N/A    Was patient discharged with a pulse oximeter? no    Non-face-to-face services provided:  Obtained and reviewed discharge summary and/or continuity of care documents  Education of patient/family/caregiver/guardian to support self-management-BP monitoring  Assessment and support for treatment adherence and medication management-complete medication review performed with patient  Reminded of need to schedule neurology follow up    Offered patient enrollment in the Remote Patient Monitoring (RPM) program for in-home monitoring: Patient is not eligible for RPM program.    Care Transitions 24 Hour Call    Do you have a copy of your discharge instructions?: Yes  Do you have all of your prescriptions and are they filled?: Yes  Have you scheduled your follow up appointment?: Yes  How are you going to get to your appointment?: Car - drive self  Post Acute Services: Outpatient/Community Services (Comment: PCP ordered outpatient PT)  Do you feel like you have everything you need to keep you well at home?: Yes  Care Transitions Interventions                                   Discussed follow-up appointments. If no appointment was previously scheduled, appointment scheduling offered: Yes. Is follow up appointment scheduled within 7 days of discharge? Yes.     Follow Up  Future Appointments   Date Time Provider Department

## 2023-11-22 ENCOUNTER — OFFICE VISIT (OUTPATIENT)
Dept: CARDIOLOGY CLINIC | Age: 63
End: 2023-11-22
Payer: COMMERCIAL

## 2023-11-22 VITALS
HEART RATE: 80 BPM | BODY MASS INDEX: 29.19 KG/M2 | WEIGHT: 171 LBS | DIASTOLIC BLOOD PRESSURE: 76 MMHG | HEIGHT: 64 IN | SYSTOLIC BLOOD PRESSURE: 136 MMHG

## 2023-11-22 DIAGNOSIS — K25.3 ACUTE GASTRIC ULCER WITHOUT HEMORRHAGE OR PERFORATION: Primary | ICD-10-CM

## 2023-11-22 DIAGNOSIS — E78.2 MIXED HYPERLIPIDEMIA: ICD-10-CM

## 2023-11-22 DIAGNOSIS — G47.33 OSA (OBSTRUCTIVE SLEEP APNEA): ICD-10-CM

## 2023-11-22 DIAGNOSIS — I10 PRIMARY HYPERTENSION: ICD-10-CM

## 2023-11-22 DIAGNOSIS — Q21.12 PFO (PATENT FORAMEN OVALE): ICD-10-CM

## 2023-11-22 PROCEDURE — 3078F DIAST BP <80 MM HG: CPT | Performed by: NURSE PRACTITIONER

## 2023-11-22 PROCEDURE — 3074F SYST BP LT 130 MM HG: CPT | Performed by: NURSE PRACTITIONER

## 2023-11-22 PROCEDURE — 99214 OFFICE O/P EST MOD 30 MIN: CPT | Performed by: NURSE PRACTITIONER

## 2023-11-22 NOTE — PROGRESS NOTES
in 2 years is 12 %   we talked about risk fo Afib and SVT after PFO closure due to the device         Acute GIB  SP ulcer cauterization 11/10/2023  Tolerating ASA  Check H/H every 2 weeks. Dyslipidemia   All available lab work was reviewed. Patient was advised to repeat lab work before next visit. Necessary orders were placed , instructions given by myself         Counseled extensively and medication compliance urged. We discussed that for the  prevention of ASCVD our  goal is aggressive risk modification. Patient is encouraged to exercise if they can , educated about  brisk walk for 30 minutes  at least 3 to 4 times a week if there are no physical limitations  Various goals were discussed and questions answered. Continue current medications. Appropriate prescriptions are addressed and refills ordered. Questions answered and patient verbalizes understanding. Call for any problems, questions, or concerns. Greater than 60 % of time spent counseling besides reviewing data and images       No follow-ups on file. An electronic signature was used to authenticate this note.     Electronically signed by AMANDA Ramirez CNP on 11/22/2023 at 10:55 AM

## 2023-11-27 ENCOUNTER — HOSPITAL ENCOUNTER (OUTPATIENT)
Age: 63
Discharge: HOME | End: 2023-11-27
Payer: COMMERCIAL

## 2023-11-27 ENCOUNTER — CARE COORDINATION (OUTPATIENT)
Dept: OTHER | Facility: CLINIC | Age: 63
End: 2023-11-27

## 2023-11-27 LAB
HCT VFR BLD CALC: 31.8 % (ref 37–47)
HEMOGLOBIN: 9.9 GM/DL (ref 12.5–16)

## 2023-11-27 PROCEDURE — 85018 HEMOGLOBIN: CPT

## 2023-11-27 PROCEDURE — 85014 HEMATOCRIT: CPT

## 2023-11-27 PROCEDURE — 36415 COLL VENOUS BLD VENIPUNCTURE: CPT

## 2023-11-29 ASSESSMENT — ENCOUNTER SYMPTOMS
COUGH: 0
SHORTNESS OF BREATH: 0

## 2023-11-30 ENCOUNTER — TELEPHONE (OUTPATIENT)
Dept: CARDIOLOGY CLINIC | Age: 63
End: 2023-11-30

## 2023-11-30 NOTE — TELEPHONE ENCOUNTER
Called patient to provide her with lab results. Discussed with patient that she should continue taking medications as prescribed and attending appointments as scheduled. Patient verbalized understanding and was reminded of next appointment.

## 2023-11-30 NOTE — TELEPHONE ENCOUNTER
----- Message from AMANDA Danielle CNP sent at 11/29/2023  6:56 PM EST -----  Regarding: FW: PFO closure  Please schedule with Dr. Maria Fernanda Sage to discuss PFO closure  ----- Message -----  From: Lauryn Robledo MD  Sent: 11/29/2023  11:07 AM EST  To: AMANDA Danielle CNP  Subject: RE: PFO closure                                  Schedule to see me   ----- Message -----  From: AMANDA Danielle CNP  Sent: 11/22/2023  11:13 AM EST  To: Lauryn Robledo MD  Subject: PFO closure                                      Had GIB 11/10/2023 Cauterized. Tolerating ASA 81 daily. I ordered hh every 2 weeks recent h/h 9.0/28  Do you want to see her first or schedule for procedure. She is ready when you are.

## 2023-12-11 ENCOUNTER — HOSPITAL ENCOUNTER (OUTPATIENT)
Age: 63
Discharge: HOME | End: 2023-12-11
Payer: COMMERCIAL

## 2023-12-11 LAB
HCT VFR BLD CALC: 33.7 % (ref 37–47)
HEMOGLOBIN: 10.6 GM/DL (ref 12.5–16)

## 2023-12-11 PROCEDURE — 85018 HEMOGLOBIN: CPT

## 2023-12-11 PROCEDURE — 36415 COLL VENOUS BLD VENIPUNCTURE: CPT

## 2023-12-11 PROCEDURE — 85014 HEMATOCRIT: CPT

## 2023-12-12 ENCOUNTER — OFFICE VISIT (OUTPATIENT)
Dept: CARDIOLOGY CLINIC | Age: 63
End: 2023-12-12
Payer: COMMERCIAL

## 2023-12-12 ENCOUNTER — TELEPHONE (OUTPATIENT)
Dept: CARDIOLOGY CLINIC | Age: 63
End: 2023-12-12

## 2023-12-12 VITALS
HEIGHT: 65 IN | WEIGHT: 171 LBS | SYSTOLIC BLOOD PRESSURE: 124 MMHG | RESPIRATION RATE: 18 BRPM | HEART RATE: 92 BPM | BODY MASS INDEX: 28.49 KG/M2 | OXYGEN SATURATION: 100 % | DIASTOLIC BLOOD PRESSURE: 72 MMHG

## 2023-12-12 DIAGNOSIS — E78.2 MIXED HYPERLIPIDEMIA: ICD-10-CM

## 2023-12-12 DIAGNOSIS — D50.9 IRON DEFICIENCY ANEMIA, UNSPECIFIED IRON DEFICIENCY ANEMIA TYPE: ICD-10-CM

## 2023-12-12 DIAGNOSIS — G47.33 OSA (OBSTRUCTIVE SLEEP APNEA): ICD-10-CM

## 2023-12-12 DIAGNOSIS — R55 SYNCOPE AND COLLAPSE: ICD-10-CM

## 2023-12-12 DIAGNOSIS — Z86.73 HISTORY OF CVA (CEREBROVASCULAR ACCIDENT): ICD-10-CM

## 2023-12-12 DIAGNOSIS — Q21.12 PFO (PATENT FORAMEN OVALE): ICD-10-CM

## 2023-12-12 DIAGNOSIS — I10 PRIMARY HYPERTENSION: Primary | ICD-10-CM

## 2023-12-12 DIAGNOSIS — R31.0 GROSS HEMATURIA: ICD-10-CM

## 2023-12-12 DIAGNOSIS — D50.0 IRON DEFICIENCY ANEMIA DUE TO CHRONIC BLOOD LOSS: ICD-10-CM

## 2023-12-12 DIAGNOSIS — R00.0 TACHYCARDIA: ICD-10-CM

## 2023-12-12 PROCEDURE — 3074F SYST BP LT 130 MM HG: CPT | Performed by: INTERNAL MEDICINE

## 2023-12-12 PROCEDURE — 99214 OFFICE O/P EST MOD 30 MIN: CPT | Performed by: INTERNAL MEDICINE

## 2023-12-12 PROCEDURE — 3078F DIAST BP <80 MM HG: CPT | Performed by: INTERNAL MEDICINE

## 2023-12-12 NOTE — PROGRESS NOTES
CARDIOLOGY  NOTE    Chief Complaint: CVA    HPI:   Milan Alvarenga is a 61y.o. year old who has Past medical history as noted below. Very pleasant lady who comes in for evaluation she is a pharmacy technician at [employer] she unfortunately developed episode of vertigo associated with dizziness and vomiting in January 2023 work-up in ED showed a left-sided cerebellar stroke but an MRI showed bilateral cerebellar strokes at [facility] she wore a 30-day monitor showed no afib  SRINIVASA showed significant right to left shunt with a PFO with aneurysmal or mobile septum. She was evaluated by neurology and referred to cardiology for possible PFO closure if appropriate  Hematology work up was also negative   She had Gi bleed in Nov 2023 endoscopy revealed hernia and ulcer she is on Protonix   She has history of hypertension for which she takes hydrochlorothiazide when she is not on it her blood pressure goes up to 160s with generally in 130s and 140s  Her sister also had a stroke who is actually 5 years younger and had a PFO which was closed  She denies any shortness of breath she is back to baseline underwent rehab and is back at work  She was evaluated by Dr. Scott Figueroa, Dr Cande Strickland  and referred for PFO closure discussion      Current Outpatient Medications   Medication Sig Dispense Refill    OXYGEN Inhale 2 L/min into the lungs at bedtime      aspirin 81 MG chewable tablet Take 1 tablet by mouth daily 30 tablet 3    pantoprazole (PROTONIX) 40 MG tablet Take 1 tablet by mouth every morning (before breakfast) 30 tablet 3    losartan (COZAAR) 50 MG tablet Take 1 tablet by mouth daily      atorvastatin (LIPITOR) 40 MG tablet Take 1 tablet by mouth nightly       No current facility-administered medications for this visit.      Facility-Administered Medications Ordered in Other Visits   Medication Dose Route Frequency Provider Last Rate Last Admin    sodium chloride flush 0.9 % injection 10 mL  10 mL

## 2023-12-12 NOTE — TELEPHONE ENCOUNTER
----- Message from AMANDA Velásquez CNP sent at 12/11/2023  2:58 PM EST -----  Blood counts look improving.  Continue checking monthly  ----- Message -----  From: [facility] Incoming Lab Results From [employer] (Nhan Brady)  Sent: 12/11/2023   2:18 PM EST  To: AMANDA Velásquez CNP

## 2023-12-13 ENCOUNTER — CARE COORDINATION (OUTPATIENT)
Dept: OTHER | Facility: CLINIC | Age: 63
End: 2023-12-13

## 2023-12-26 ENCOUNTER — TELEPHONE (OUTPATIENT)
Dept: CARDIOLOGY CLINIC | Age: 63
End: 2023-12-26

## 2023-12-26 DIAGNOSIS — R55 SYNCOPE AND COLLAPSE: Primary | ICD-10-CM

## 2023-12-29 ENCOUNTER — TELEPHONE (OUTPATIENT)
Dept: CARDIOLOGY CLINIC | Age: 63
End: 2023-12-29

## 2023-12-29 NOTE — TELEPHONE ENCOUNTER
[facility] [address] (PFO) CLOSURE                      Patient Name: Jordan Santos   : 1960  MRN# [id number]    Date of Procedure: 24 Time: 12 Arrival Time: 10    The catheterization and angiogram are usually outpatient procedures, however if stenting is needed you may need to stay overnight. You will need to arrive at the hospital two hours before the procedure. You will go to registration in the main lobby. You will need to arrange for someone to drive you home. HOSPITAL:  [facility])      X   If you have received orders for blood work and or a chest x-ray, please have         them done on assigned date at [facility],           [facility], or [facility].     X Please do not have anything by mouth after midnight prior to or 8 hours before   the procedure.     X You may take your medications with a sip of water in the morning of your               procedure or take them with you to the hospital

## 2024-01-04 ENCOUNTER — TELEPHONE (OUTPATIENT)
Dept: GASTROENTEROLOGY | Age: 64
End: 2024-01-04

## 2024-01-08 ENCOUNTER — HOSPITAL ENCOUNTER (OUTPATIENT)
Age: 64
Discharge: HOME | End: 2024-01-08
Payer: COMMERCIAL

## 2024-01-08 LAB
HCT VFR BLD CALC: 37.3 % (ref 37–47)
HEMOGLOBIN: 11.7 GM/DL (ref 12.5–16)

## 2024-01-08 PROCEDURE — 85018 HEMOGLOBIN: CPT

## 2024-01-08 PROCEDURE — 36415 COLL VENOUS BLD VENIPUNCTURE: CPT

## 2024-01-08 PROCEDURE — 85014 HEMATOCRIT: CPT

## 2024-01-09 ENCOUNTER — TELEPHONE (OUTPATIENT)
Dept: CARDIOLOGY CLINIC | Age: 64
End: 2024-01-09

## 2024-01-09 NOTE — TELEPHONE ENCOUNTER
----- Message from AMANDA Mendoza CNP sent at 1/8/2024  3:42 PM EST -----  Continue current plan  ----- Message -----  From: [facility] Incoming Lab Results From [employer] ([facility] [employer])  Sent: 1/8/2024   3:27 PM EST  To: AMANDA Mendoza CNP

## 2024-01-19 ENCOUNTER — TELEPHONE (OUTPATIENT)
Dept: CARDIOLOGY CLINIC | Age: 64
End: 2024-01-19

## 2024-01-22 ENCOUNTER — HOSPITAL ENCOUNTER (OUTPATIENT)
Age: 64
Discharge: HOME | End: 2024-01-22
Payer: COMMERCIAL

## 2024-01-22 LAB
ABO/RH: NORMAL
ANTIBODY SCREEN: NEGATIVE
COMMENT: NORMAL
HCT VFR BLD CALC: 34.8 % (ref 37–47)
HEMOGLOBIN: 11 GM/DL (ref 12.5–16)
MCH RBC QN AUTO: 28.1 PG (ref 27–31)
MCHC RBC AUTO-ENTMCNC: 31.6 % (ref 32–36)
MCV RBC AUTO: 89 FL (ref 78–100)
PDW BLD-RTO: 12.7 % (ref 11.7–14.9)
PLATELET # BLD: 252 K/CU MM (ref 140–440)
PMV BLD AUTO: 9.5 FL (ref 7.5–11.1)
RBC # BLD: 3.91 M/CU MM (ref 4.2–5.4)
WBC # BLD: 7.3 K/CU MM (ref 4–10.5)

## 2024-01-22 PROCEDURE — 86901 BLOOD TYPING SEROLOGIC RH(D): CPT

## 2024-01-22 PROCEDURE — 85027 COMPLETE CBC AUTOMATED: CPT

## 2024-01-22 PROCEDURE — 86900 BLOOD TYPING SEROLOGIC ABO: CPT

## 2024-01-22 PROCEDURE — 36415 COLL VENOUS BLD VENIPUNCTURE: CPT

## 2024-01-22 PROCEDURE — 86850 RBC ANTIBODY SCREEN: CPT

## 2024-01-24 ENCOUNTER — HOSPITAL ENCOUNTER (INPATIENT)
Age: 64
LOS: 1 days | Discharge: HOME HEALTH | DRG: 274 | End: 2024-01-25
Attending: INTERNAL MEDICINE | Admitting: INTERNAL MEDICINE
Payer: COMMERCIAL

## 2024-01-24 DIAGNOSIS — Q21.12 PFO (PATENT FORAMEN OVALE): ICD-10-CM

## 2024-01-24 PROBLEM — Z87.74 S/P PATENT FORAMEN OVALE CLOSURE: Status: ACTIVE | Noted: 2024-01-24

## 2024-01-24 LAB
ACTIVATED CLOTTING TIME, LOW RANGE: 182 SEC
ACTIVATED CLOTTING TIME, LOW RANGE: 218 SEC
ACTIVATED CLOTTING TIME, LOW RANGE: 278 SEC
ACTIVATED CLOTTING TIME, LOW RANGE: >400 SEC
ANION GAP SERPL CALCULATED.3IONS-SCNC: 10 MMOL/L (ref 7–16)
BUN SERPL-MCNC: 16 MG/DL (ref 6–23)
CALCIUM SERPL-MCNC: 8.9 MG/DL (ref 8.3–10.6)
CHLORIDE BLD-SCNC: 103 MMOL/L (ref 99–110)
CO2: 29 MMOL/L (ref 21–32)
CREAT SERPL-MCNC: 0.6 MG/DL (ref 0.6–1.1)
ECHO BSA: 1.92 M2
GFR SERPL CREATININE-BSD FRML MDRD: >60 ML/MIN/1.73M2
GLUCOSE SERPL-MCNC: 89 MG/DL (ref 70–99)
POC ACT PLUS: 158 SEC
POTASSIUM SERPL-SCNC: 3.6 MMOL/L (ref 3.5–5.1)
SODIUM BLD-SCNC: 142 MMOL/L (ref 135–145)

## 2024-01-24 PROCEDURE — 6360000002 HC RX W HCPCS

## 2024-01-24 PROCEDURE — 6360000002 HC RX W HCPCS: Performed by: INTERNAL MEDICINE

## 2024-01-24 PROCEDURE — 2580000003 HC RX 258: Performed by: INTERNAL MEDICINE

## 2024-01-24 PROCEDURE — 7100000010 HC PHASE II RECOVERY - FIRST 15 MIN: Performed by: INTERNAL MEDICINE

## 2024-01-24 PROCEDURE — C1769 GUIDE WIRE: HCPCS | Performed by: INTERNAL MEDICINE

## 2024-01-24 PROCEDURE — C1817 SEPTAL DEFECT IMP SYS: HCPCS | Performed by: INTERNAL MEDICINE

## 2024-01-24 PROCEDURE — C1760 CLOSURE DEV, VASC: HCPCS | Performed by: INTERNAL MEDICINE

## 2024-01-24 PROCEDURE — 80048 BASIC METABOLIC PNL TOTAL CA: CPT

## 2024-01-24 PROCEDURE — 2709999900 HC NON-CHARGEABLE SUPPLY: Performed by: INTERNAL MEDICINE

## 2024-01-24 PROCEDURE — C1894 INTRO/SHEATH, NON-LASER: HCPCS | Performed by: INTERNAL MEDICINE

## 2024-01-24 PROCEDURE — 02U53JZ SUPPLEMENT ATRIAL SEPTUM WITH SYNTHETIC SUBSTITUTE, PERCUTANEOUS APPROACH: ICD-10-PCS | Performed by: INTERNAL MEDICINE

## 2024-01-24 PROCEDURE — 2700000000 HC OXYGEN THERAPY PER DAY

## 2024-01-24 PROCEDURE — 2580000003 HC RX 258

## 2024-01-24 PROCEDURE — 94761 N-INVAS EAR/PLS OXIMETRY MLT: CPT

## 2024-01-24 PROCEDURE — 6370000000 HC RX 637 (ALT 250 FOR IP): Performed by: INTERNAL MEDICINE

## 2024-01-24 PROCEDURE — 99153 MOD SED SAME PHYS/QHP EA: CPT | Performed by: INTERNAL MEDICINE

## 2024-01-24 PROCEDURE — 93580 TRANSCATH CLOSURE OF ASD: CPT | Performed by: INTERNAL MEDICINE

## 2024-01-24 PROCEDURE — 2500000003 HC RX 250 WO HCPCS

## 2024-01-24 PROCEDURE — C1759 CATH, INTRA ECHOCARDIOGRAPHY: HCPCS | Performed by: INTERNAL MEDICINE

## 2024-01-24 PROCEDURE — 99152 MOD SED SAME PHYS/QHP 5/>YRS: CPT | Performed by: INTERNAL MEDICINE

## 2024-01-24 PROCEDURE — 85347 COAGULATION TIME ACTIVATED: CPT

## 2024-01-24 PROCEDURE — 6360000004 HC RX CONTRAST MEDICATION

## 2024-01-24 PROCEDURE — 2140000000 HC CCU INTERMEDIATE R&B

## 2024-01-24 PROCEDURE — 7100000011 HC PHASE II RECOVERY - ADDTL 15 MIN: Performed by: INTERNAL MEDICINE

## 2024-01-24 PROCEDURE — 2500000003 HC RX 250 WO HCPCS: Performed by: INTERNAL MEDICINE

## 2024-01-24 DEVICE — CARDIOFORM SEPTAL OCCLUDER 25MM 10FR
Type: IMPLANTABLE DEVICE | Site: HEART | Status: FUNCTIONAL
Brand: GORE CARDIOFORM SEPTAL OCCLUDER

## 2024-01-24 RX ORDER — M-VIT,TX,IRON,MINS/CALC/FOLIC 27MG-0.4MG
1 TABLET ORAL DAILY
Status: DISCONTINUED | OUTPATIENT
Start: 2024-01-24 | End: 2024-01-25 | Stop reason: HOSPADM

## 2024-01-24 RX ORDER — HEPARIN SODIUM 1000 [USP'U]/ML
INJECTION, SOLUTION INTRAVENOUS; SUBCUTANEOUS PRN
Status: DISCONTINUED | OUTPATIENT
Start: 2024-01-24 | End: 2024-01-24 | Stop reason: HOSPADM

## 2024-01-24 RX ORDER — ASPIRIN 81 MG/1
81 TABLET, CHEWABLE ORAL DAILY
Status: DISCONTINUED | OUTPATIENT
Start: 2024-01-24 | End: 2024-01-24 | Stop reason: SDUPTHER

## 2024-01-24 RX ORDER — ASPIRIN 81 MG/1
81 TABLET, CHEWABLE ORAL DAILY
Status: DISCONTINUED | OUTPATIENT
Start: 2024-01-25 | End: 2024-01-25 | Stop reason: HOSPADM

## 2024-01-24 RX ORDER — LOSARTAN POTASSIUM 25 MG/1
50 TABLET ORAL DAILY
Status: DISCONTINUED | OUTPATIENT
Start: 2024-01-24 | End: 2024-01-25 | Stop reason: HOSPADM

## 2024-01-24 RX ORDER — CLOPIDOGREL BISULFATE 75 MG/1
75 TABLET ORAL DAILY
Status: DISCONTINUED | OUTPATIENT
Start: 2024-01-25 | End: 2024-01-25 | Stop reason: HOSPADM

## 2024-01-24 RX ORDER — SODIUM CHLORIDE 0.9 % (FLUSH) 0.9 %
5-40 SYRINGE (ML) INJECTION EVERY 12 HOURS SCHEDULED
Status: DISCONTINUED | OUTPATIENT
Start: 2024-01-24 | End: 2024-01-25 | Stop reason: HOSPADM

## 2024-01-24 RX ORDER — ONDANSETRON 2 MG/ML
4 INJECTION INTRAMUSCULAR; INTRAVENOUS EVERY 6 HOURS PRN
Status: DISCONTINUED | OUTPATIENT
Start: 2024-01-24 | End: 2024-01-25 | Stop reason: HOSPADM

## 2024-01-24 RX ORDER — ASPIRIN 81 MG/1
TABLET, CHEWABLE ORAL PRN
Status: DISCONTINUED | OUTPATIENT
Start: 2024-01-24 | End: 2024-01-24 | Stop reason: HOSPADM

## 2024-01-24 RX ORDER — SODIUM CHLORIDE 9 MG/ML
INJECTION, SOLUTION INTRAVENOUS CONTINUOUS PRN
Status: COMPLETED | OUTPATIENT
Start: 2024-01-24 | End: 2024-01-24

## 2024-01-24 RX ORDER — ACETAMINOPHEN 325 MG/1
650 TABLET ORAL EVERY 4 HOURS PRN
Status: DISCONTINUED | OUTPATIENT
Start: 2024-01-24 | End: 2024-01-25 | Stop reason: HOSPADM

## 2024-01-24 RX ORDER — PANTOPRAZOLE SODIUM 40 MG/1
40 TABLET, DELAYED RELEASE ORAL
Status: DISCONTINUED | OUTPATIENT
Start: 2024-01-25 | End: 2024-01-25 | Stop reason: HOSPADM

## 2024-01-24 RX ORDER — CEFAZOLIN SODIUM 1 G/3ML
INJECTION, POWDER, FOR SOLUTION INTRAMUSCULAR; INTRAVENOUS PRN
Status: DISCONTINUED | OUTPATIENT
Start: 2024-01-24 | End: 2024-01-24 | Stop reason: HOSPADM

## 2024-01-24 RX ORDER — SODIUM CHLORIDE 0.9 % (FLUSH) 0.9 %
5-40 SYRINGE (ML) INJECTION PRN
Status: DISCONTINUED | OUTPATIENT
Start: 2024-01-24 | End: 2024-01-25 | Stop reason: HOSPADM

## 2024-01-24 RX ORDER — ATORVASTATIN CALCIUM 40 MG/1
40 TABLET, FILM COATED ORAL NIGHTLY
Status: DISCONTINUED | OUTPATIENT
Start: 2024-01-24 | End: 2024-01-25 | Stop reason: HOSPADM

## 2024-01-24 RX ORDER — FENTANYL CITRATE 50 UG/ML
INJECTION, SOLUTION INTRAMUSCULAR; INTRAVENOUS PRN
Status: DISCONTINUED | OUTPATIENT
Start: 2024-01-24 | End: 2024-01-24 | Stop reason: HOSPADM

## 2024-01-24 RX ORDER — SODIUM CHLORIDE 9 MG/ML
INJECTION, SOLUTION INTRAVENOUS PRN
Status: DISCONTINUED | OUTPATIENT
Start: 2024-01-24 | End: 2024-01-25 | Stop reason: HOSPADM

## 2024-01-24 RX ORDER — CLOPIDOGREL BISULFATE 75 MG/1
TABLET ORAL PRN
Status: DISCONTINUED | OUTPATIENT
Start: 2024-01-24 | End: 2024-01-24 | Stop reason: HOSPADM

## 2024-01-24 RX ORDER — MIDAZOLAM HYDROCHLORIDE 1 MG/ML
INJECTION INTRAMUSCULAR; INTRAVENOUS PRN
Status: DISCONTINUED | OUTPATIENT
Start: 2024-01-24 | End: 2024-01-24 | Stop reason: HOSPADM

## 2024-01-24 RX ADMIN — SODIUM CHLORIDE, PRESERVATIVE FREE 10 ML: 5 INJECTION INTRAVENOUS at 21:01

## 2024-01-24 RX ADMIN — ATORVASTATIN CALCIUM 40 MG: 40 TABLET, FILM COATED ORAL at 21:01

## 2024-01-24 NOTE — PROGRESS NOTES
4 Eyes Skin Assessment     NAME:  Simona Siu  YOB: 1960  MEDICAL RECORD NUMBER:  [id number]    The patient is being assessed for  Admission    I agree that at least one RN has performed a thorough Head to Toe Skin Assessment on the patient. ALL assessment sites listed below have been assessed.      Areas assessed by both nurses:    Head, Face, Ears, Shoulders, Back, Chest, Arms, Elbows, Hands, Sacrum. Buttock, Coccyx, Ischium, Legs. Feet and Heels, and Under Medical Devices         Does the Patient have a Wound? No noted wound(s)       Sadi Prevention initiated by RN: No  Wound Care Orders initiated by RN: No    Pressure Injury (Stage 3,4, Unstageable, DTI, NWPT, and Complex wounds) if present, place Wound referral order by RN under : No    New Ostomies, if present place, Ostomy referral order under : No     Nurse 1 eSignature: Electronically signed by Saranya Temple RN on 1/24/24 at 5:50 PM EST    **SHARE this note so that the co-signing nurse can place an eSignature**    Nurse 2 eSignature: {Esignature:[id number]}

## 2024-01-24 NOTE — H&P
hematuria  Musculoskeletal:  No gait disturbance, weakness or joint complaints  Integumentary: No rash or pruritis  Neurological: No TIA or stroke symptoms  Psychiatric: No anxiety or depression  Endocrine: No malaise, fatigue or temperature intolerance  Hematologic/Lymphatic: No bleeding problems, blood clots or swollen lymph nodes  Allergic/Immunologic: No nasal congestion or hives    Physical Examination:    There were no vitals taken for this visit.     General Appearance:  No distress, conversant  Constitutional:  Well developed, Well nourished, No acute distress, Non-toxic appearance.   HENT:  Normocephalic, Atraumatic, Bilateral external ears normal, Oropharynx moist, No oral exudates, Nose normal. Neck- Normal range of motion, No tenderness, Supple, No stridor,no apical-carotid delay  Eyes:  PERRL, EOMI, Conjunctiva normal, No discharge.   Lymphatics: no palpable lymph nodes  Respiratory:  Normal breath sounds, No respiratory distress, No wheezing, No chest tenderness.,no uise of accessory muscles, JVP not elevated  Cardiovascular: (PMI) apex non displaced,no lifts no thrills, no s3,no s4, Normal heart rate, Normal rhythm, No murmurs, No rubs, No gallops.   GI:  Bowel sounds normal, Soft, No tenderness, No masses, No pulsatile masses, no hepatosplenomegally, no bruits  Musculoskeletal:  Intact distal pulses, No edema, No tenderness, No cyanosis, No clubbing. Good range of motion in all major joints. No tenderness to palpation or major deformities noted. Back- No tenderness.   Integument:  Warm, Dry, No erythema, No rash.   Skin: no rash, no ulcers  Lymphatic:  No lymphadenopathy noted.   Neurologic:  Alert & oriented x 3, Normal motor function, Normal sensory function, No focal deficits noted.         Lab Review   Recent Labs     01/22/24  1409   WBC 7.3   HGB 11.0*   HCT 34.8*         No results for input(s): \"NA\", \"K\", \"CL\", \"CO2\", \"PHOS\", \"BUN\", \"CREATININE\", \"CA\" in the last 72 hours.  No results  for input(s): \"AST\", \"ALT\", \"ALB\", \"BILIDIR\", \"BILITOT\", \"ALKPHOS\" in the last 72 hours.  No results for input(s): \"TROPONINI\" in the last 72 hours.  Lab Results   Component Value Date    INR 1.1 11/09/2023    PROTIME 14.2 11/09/2023     No results found for: \"BNP\"      Assessment:    Active Problems:    * No active hospital problems. *     ASA : 2 , Mallampati class II  Plan:   1. : Alternate and risks versus benefits were discussed in detail.  We will plan PFO closure  We  discussed the risk of but not limited to  potential kidney failure, emergent surgery,blood transfusion  transfusion, infection, potential even death.  Patient is in agreement and wants to proceed

## 2024-01-24 NOTE — PROCEDURES
1.  Intra-atrial Communication Channel Closure Procedure        Patent Foramen Ovale Closure using [place] device 28 mm Serial Number [id number],ID number [id number]    2. Intra cardiac echo cardiography    3.   Ultrasound guided venous access     Patient was brought to the Cath lab. After appropriate Time out and identification all appropriate  Sterile precautions were taken .  Bilateral femoral groins were prepped and draped.  Femoral venous access was obtained 11 Belgian sheath was placed in the right groin.  9 Belgian sheath was placed in the left groin.  Intracardiac echocardiography catheter was advanced and positioned in the right ventricle.  Images were obtained.   Patient was given 800O Units of  Heparin.  Ore close device was placed in right groin   We then proceeded with advancing a 4 Belgian multipurpose catheter through the 12 F sheath .  A Glidewire was advanced from the right atrium into the left atrium through the PFO.  Intra-atrial communicating channel.  4 Belgian catheter was advanced under fluoroscopy and echocardiographic guidance.  Catheter was advanced into the left.  Pulmonary vein.  Glidewire was exchanged for an Amplatz wire and placed in left superior pulmonary vein.  We then advanced a 28 mm [place]  device over the Amplatz wire.  Intracardiac Echocardiographic and fluoroscopy was used for guidance.    The left atrial disc was advanced and position comfirmed  We then advanced the right atrial disc.  Once the position was confirmed.  The device was detached.  Bubble study was completed.  Once we were sure that the Bubble study was negative for any shunt.  We proceeded with detaching the device and the delivery system was taken out of the body.  ICE catheter was then removed.  Per close was used for right femoral closure   ACT was confirmed   Left femoral  sheaths was left to be removed once ACT is normal       Gio Monreal MD , FACC

## 2024-01-25 ENCOUNTER — APPOINTMENT (OUTPATIENT)
Dept: GENERAL RADIOLOGY | Age: 64
DRG: 274 | End: 2024-01-25
Attending: INTERNAL MEDICINE
Payer: COMMERCIAL

## 2024-01-25 ENCOUNTER — APPOINTMENT (OUTPATIENT)
Dept: NON INVASIVE DIAGNOSTICS | Age: 64
DRG: 274 | End: 2024-01-25
Attending: INTERNAL MEDICINE
Payer: COMMERCIAL

## 2024-01-25 VITALS
BODY MASS INDEX: 30.73 KG/M2 | WEIGHT: 180 LBS | SYSTOLIC BLOOD PRESSURE: 166 MMHG | TEMPERATURE: 98.2 F | HEART RATE: 53 BPM | OXYGEN SATURATION: 95 % | HEIGHT: 64 IN | DIASTOLIC BLOOD PRESSURE: 93 MMHG | RESPIRATION RATE: 20 BRPM

## 2024-01-25 LAB
ALBUMIN SERPL-MCNC: 4.4 GM/DL (ref 3.4–5)
ALP BLD-CCNC: 89 IU/L (ref 40–128)
ALT SERPL-CCNC: 21 U/L (ref 10–40)
ANION GAP SERPL CALCULATED.3IONS-SCNC: 12 MMOL/L (ref 7–16)
AST SERPL-CCNC: 23 IU/L (ref 15–37)
BASOPHILS ABSOLUTE: 0 K/CU MM
BASOPHILS RELATIVE PERCENT: 0.5 % (ref 0–1)
BILIRUB SERPL-MCNC: 0.5 MG/DL (ref 0–1)
BUN SERPL-MCNC: 14 MG/DL (ref 6–23)
CALCIUM SERPL-MCNC: 9.2 MG/DL (ref 8.3–10.6)
CHLORIDE BLD-SCNC: 102 MMOL/L (ref 99–110)
CO2: 24 MMOL/L (ref 21–32)
CREAT SERPL-MCNC: 0.5 MG/DL (ref 0.6–1.1)
DIFFERENTIAL TYPE: ABNORMAL
ECHO AO ROOT DIAM: 2.4 CM
ECHO AO ROOT INDEX: 1.28 CM/M2
ECHO AV AREA PEAK VELOCITY: 1.9 CM2
ECHO AV AREA VTI: 1.8 CM2
ECHO AV AREA/BSA PEAK VELOCITY: 1 CM2/M2
ECHO AV AREA/BSA VTI: 1 CM2/M2
ECHO AV MEAN GRADIENT: 7 MMHG
ECHO AV MEAN VELOCITY: 1.3 M/S
ECHO AV PEAK GRADIENT: 11 MMHG
ECHO AV PEAK VELOCITY: 1.6 M/S
ECHO AV VELOCITY RATIO: 0.88
ECHO AV VTI: 36.3 CM
ECHO BSA: 1.92 M2
ECHO EST RA PRESSURE: 3 MMHG
ECHO IVC PROX: 1.5 CM
ECHO LA AREA 4C: 13.4 CM2
ECHO LA DIAMETER INDEX: 0.96 CM/M2
ECHO LA DIAMETER: 1.8 CM
ECHO LA MAJOR AXIS: 5.9 CM
ECHO LA TO AORTIC ROOT RATIO: 0.75
ECHO LA VOL MOD A4C: 24 ML (ref 22–52)
ECHO LA VOLUME INDEX MOD A4C: 13 ML/M2 (ref 16–34)
ECHO LV E' LATERAL VELOCITY: 9 CM/S
ECHO LV E' SEPTAL VELOCITY: 8 CM/S
ECHO LV EDV A4C: 65 ML
ECHO LV EDV INDEX A4C: 35 ML/M2
ECHO LV EJECTION FRACTION A4C: 70 %
ECHO LV ESV A4C: 20 ML
ECHO LV ESV INDEX A4C: 11 ML/M2
ECHO LV FRACTIONAL SHORTENING: 26 % (ref 28–44)
ECHO LV INTERNAL DIMENSION DIASTOLE INDEX: 2.03 CM/M2
ECHO LV INTERNAL DIMENSION DIASTOLIC: 3.8 CM (ref 3.9–5.3)
ECHO LV INTERNAL DIMENSION SYSTOLIC INDEX: 1.5 CM/M2
ECHO LV INTERNAL DIMENSION SYSTOLIC: 2.8 CM
ECHO LV IVSD: 0.9 CM (ref 0.6–0.9)
ECHO LV MASS 2D: 101.1 G (ref 67–162)
ECHO LV MASS INDEX 2D: 54 G/M2 (ref 43–95)
ECHO LV POSTERIOR WALL DIASTOLIC: 0.9 CM (ref 0.6–0.9)
ECHO LV RELATIVE WALL THICKNESS RATIO: 0.47
ECHO LVOT AREA: 2.3 CM2
ECHO LVOT AV VTI INDEX: 0.81
ECHO LVOT DIAM: 1.7 CM
ECHO LVOT MEAN GRADIENT: 4 MMHG
ECHO LVOT PEAK GRADIENT: 8 MMHG
ECHO LVOT PEAK VELOCITY: 1.4 M/S
ECHO LVOT STROKE VOLUME INDEX: 35.8 ML/M2
ECHO LVOT SV: 66.9 ML
ECHO LVOT VTI: 29.5 CM
ECHO MV A VELOCITY: 1.31 M/S
ECHO MV E VELOCITY: 0.88 M/S
ECHO MV E/A RATIO: 0.67
ECHO MV E/E' LATERAL: 9.78
ECHO MV E/E' RATIO (AVERAGED): 10.39
ECHO RIGHT VENTRICULAR SYSTOLIC PRESSURE (RVSP): 27 MMHG
ECHO RV MID DIMENSION: 2.2 CM
ECHO TV REGURGITANT MAX VELOCITY: 2.45 M/S
ECHO TV REGURGITANT PEAK GRADIENT: 24 MMHG
EOSINOPHILS ABSOLUTE: 0.1 K/CU MM
EOSINOPHILS RELATIVE PERCENT: 0.8 % (ref 0–3)
GFR SERPL CREATININE-BSD FRML MDRD: >60 ML/MIN/1.73M2
GLUCOSE SERPL-MCNC: 113 MG/DL (ref 70–99)
HCT VFR BLD CALC: 39.2 % (ref 37–47)
HEMOGLOBIN: 12 GM/DL (ref 12.5–16)
IMMATURE NEUTROPHIL %: 0.3 % (ref 0–0.43)
LYMPHOCYTES ABSOLUTE: 1.8 K/CU MM
LYMPHOCYTES RELATIVE PERCENT: 22.6 % (ref 24–44)
MCH RBC QN AUTO: 28 PG (ref 27–31)
MCHC RBC AUTO-ENTMCNC: 30.6 % (ref 32–36)
MCV RBC AUTO: 91.6 FL (ref 78–100)
MONOCYTES ABSOLUTE: 0.5 K/CU MM
MONOCYTES RELATIVE PERCENT: 6.3 % (ref 0–4)
NUCLEATED RBC %: 0 %
PDW BLD-RTO: 12.7 % (ref 11.7–14.9)
PLATELET # BLD: 236 K/CU MM (ref 140–440)
PMV BLD AUTO: 9.8 FL (ref 7.5–11.1)
POTASSIUM SERPL-SCNC: 3.7 MMOL/L (ref 3.5–5.1)
RBC # BLD: 4.28 M/CU MM (ref 4.2–5.4)
SEGMENTED NEUTROPHILS ABSOLUTE COUNT: 5.5 K/CU MM
SEGMENTED NEUTROPHILS RELATIVE PERCENT: 69.5 % (ref 36–66)
SODIUM BLD-SCNC: 138 MMOL/L (ref 135–145)
TOTAL IMMATURE NEUTOROPHIL: 0.02 K/CU MM
TOTAL NUCLEATED RBC: 0 K/CU MM
TOTAL PROTEIN: 6.9 GM/DL (ref 6.4–8.2)
WBC # BLD: 7.9 K/CU MM (ref 4–10.5)

## 2024-01-25 PROCEDURE — 99239 HOSP IP/OBS DSCHRG MGMT >30: CPT

## 2024-01-25 PROCEDURE — 2580000003 HC RX 258: Performed by: INTERNAL MEDICINE

## 2024-01-25 PROCEDURE — 85025 COMPLETE CBC W/AUTO DIFF WBC: CPT

## 2024-01-25 PROCEDURE — 71046 X-RAY EXAM CHEST 2 VIEWS: CPT

## 2024-01-25 PROCEDURE — 93306 TTE W/DOPPLER COMPLETE: CPT

## 2024-01-25 PROCEDURE — 6370000000 HC RX 637 (ALT 250 FOR IP): Performed by: INTERNAL MEDICINE

## 2024-01-25 PROCEDURE — 93306 TTE W/DOPPLER COMPLETE: CPT | Performed by: INTERNAL MEDICINE

## 2024-01-25 PROCEDURE — 94761 N-INVAS EAR/PLS OXIMETRY MLT: CPT

## 2024-01-25 PROCEDURE — 80053 COMPREHEN METABOLIC PANEL: CPT

## 2024-01-25 PROCEDURE — 36415 COLL VENOUS BLD VENIPUNCTURE: CPT

## 2024-01-25 RX ORDER — CLOPIDOGREL BISULFATE 75 MG/1
75 TABLET ORAL DAILY
Qty: 30 TABLET | Refills: 3 | Status: SHIPPED | OUTPATIENT
Start: 2024-01-26

## 2024-01-25 RX ADMIN — Medication 1 TABLET: at 09:11

## 2024-01-25 RX ADMIN — CLOPIDOGREL BISULFATE 75 MG: 75 TABLET ORAL at 09:11

## 2024-01-25 RX ADMIN — SODIUM CHLORIDE, PRESERVATIVE FREE 10 ML: 5 INJECTION INTRAVENOUS at 09:11

## 2024-01-25 RX ADMIN — LOSARTAN POTASSIUM 50 MG: 25 TABLET, FILM COATED ORAL at 09:11

## 2024-01-25 RX ADMIN — PANTOPRAZOLE SODIUM 40 MG: 40 TABLET, DELAYED RELEASE ORAL at 09:11

## 2024-01-25 RX ADMIN — ASPIRIN 81 MG: 81 TABLET, CHEWABLE ORAL at 09:11

## 2024-01-25 NOTE — DISCHARGE INSTRUCTIONS
PFO closure limitations  -No lifting over 20 pounds for 7-10 days.  -Avoid driving for 24-72 hours  -No water submersion (bath, hot tub, swimming), but okay to shower.    Some bruising is common and should not be alarming.   Advised patient to return to emergency department ASAP if they notice bleeding bright red blood. Potential life threatening condition.  Informed to contact the office if they notice new fever, excessive warmth, redness, swelling, palpable mass, or pus draining from site.

## 2024-01-25 NOTE — PROGRESS NOTES
Outpatient Pharmacy Progress Note for Meds-to-Beds    Total number of Prescriptions Filled: 1  The following medications were dispensed to the patient during the discharge process:  Clopidogrel     Additional Documentation:  Medication(s) were delivered to the patient's room prior to discharge      Thank you for letting us [place] [address]    Phone: [phone]    Fax: [phone]

## 2024-01-25 NOTE — DISCHARGE SUMMARY
Patient ID:  Simona Siu  [id number] 63 y.o. 1960    Admit date: 1/24/2024    Discharge date: 1/25/2024    Admitting Physician: Gio Monreal MD     Discharge Provider: Vadim Higuera PA-C     Admission Diagnoses: PFO (patent foramen ovale) [Q21.12]  S/P patent foramen ovale closure [Z87.74]    Discharge Diagnoses:   Patient Active Problem List   Diagnosis    Iron deficiency anemia due to chronic blood loss    Gastroesophageal reflux disease with esophagitis    Hiatal hernia    Status post laparoscopic Nissen fundoplication    Urinary tract infection with hematuria    Gross hematuria    Iron deficiency anemia    Acute midline thoracic back pain    Nondisplaced fracture of fifth metatarsal bone, right foot, initial encounter for closed fracture    History of CVA (cerebrovascular accident)    Primary hypertension    Mixed hyperlipidemia    PFO (patent foramen ovale)    YANET (obstructive sleep apnea)    Hypersomnia    Obesity (BMI 30-39.9)    Sleep related hypoxia    Tachycardia    GIB (gastrointestinal bleeding)    Melena    Acute gastric ulcer without hemorrhage or perforation    Syncope and collapse    S/P patent foramen ovale closure        Discharged Condition: good    Hospital Course: Simona Siu was admitted following undergoing PFO closure with Dr. Monreal.    Patient has had an uneventful recovery. She is symptom free at this time. Echo showed stable PFO closure device, bubble study negative. Patient did have a recent bilateral cerebellar stoke at [facility]. 30 day monitor did not reveal any significant arrhythmia.     Blood pressure stable, no acute signs of CVA at this time.     All questions answered. Patient is comfortable with discharge and ready to go home.     Patient is start taking aspirin and Plavix      Procedures:  1.  Intra-atrial Communication Channel Closure Procedure        Patent Foramen Ovale Closure using [place] device 28 mm Serial Number [id number],ID number [id number]  2. Intra cardiac  [facility]-[address] - P [phone] - F [phone]  [address] [facility], [facility] [address]      Phone: [phone]   clopidogrel 75 MG tablet          Discharge Exam:  Physical Exam  Constitutional:       General: She is not in acute distress.     Appearance: She is not diaphoretic.   HENT:      Head: Normocephalic and atraumatic.      Mouth/Throat:      Mouth: Mucous membranes are moist.   Eyes:      Extraocular Movements: Extraocular movements intact.      Comments: Pupils equal and round   Cardiovascular:      Rate and Rhythm: Normal rate and regular rhythm.      Pulses: Normal pulses.      Heart sounds: S1 normal and S2 normal. No murmur heard.     No friction rub. No gallop.      Comments: Right + Left groin site dressing is clean dry and intact, site is soft without hematoma, bruising or bleeding noted.    Pulmonary:      Effort: Pulmonary effort is normal.      Breath sounds: Normal breath sounds. No rales.   Chest:      Chest wall: No tenderness.   Abdominal:      Palpations: Abdomen is soft.      Tenderness: There is no abdominal tenderness.   Musculoskeletal:      Right lower leg: No edema.      Left lower leg: No edema.   Skin:     General: Skin is warm and dry.      Capillary Refill: Capillary refill takes less than 2 seconds.      Findings: No rash.      Comments: Skin turgor brisk   Neurological:      Mental Status: She is alert and oriented to person, place, and time. Mental status is at baseline.   Psychiatric:         Behavior: Behavior is cooperative.         Thought Content: Thought content normal.         Judgment: Judgment normal.          Disposition: home    Patient Instructions:      Medication List        START taking these medications      clopidogrel 75 MG tablet  Commonly known as: PLAVIX  Take 1 tablet by mouth daily  Start taking on: January 26, 2024            CONTINUE taking these medications      aspirin 81 MG chewable tablet  Take

## 2024-01-25 NOTE — PLAN OF CARE
Problem: Discharge Planning  Goal: Discharge to home or other facility with appropriate resources  Outcome: Completed      alert and oriented x 3/normal strength

## 2024-01-26 ENCOUNTER — CARE COORDINATION (OUTPATIENT)
Dept: OTHER | Facility: CLINIC | Age: 64
End: 2024-01-26

## 2024-01-26 NOTE — CARE COORDINATION
determinants of health to be addressed.  Patient denies any ongoing ACM needs at this time and has ACM contact information for any future needs.     Arline Mazariegos RN

## 2024-01-30 ENCOUNTER — OFFICE VISIT (OUTPATIENT)
Dept: CARDIOLOGY CLINIC | Age: 64
End: 2024-01-30
Payer: COMMERCIAL

## 2024-01-30 VITALS
OXYGEN SATURATION: 98 % | SYSTOLIC BLOOD PRESSURE: 110 MMHG | HEART RATE: 75 BPM | BODY MASS INDEX: 29.02 KG/M2 | WEIGHT: 170 LBS | HEIGHT: 64 IN | DIASTOLIC BLOOD PRESSURE: 70 MMHG

## 2024-01-30 DIAGNOSIS — D50.9 IRON DEFICIENCY ANEMIA, UNSPECIFIED IRON DEFICIENCY ANEMIA TYPE: ICD-10-CM

## 2024-01-30 DIAGNOSIS — I10 PRIMARY HYPERTENSION: ICD-10-CM

## 2024-01-30 DIAGNOSIS — E78.2 MIXED HYPERLIPIDEMIA: ICD-10-CM

## 2024-01-30 DIAGNOSIS — Z87.74 S/P PATENT FORAMEN OVALE CLOSURE: ICD-10-CM

## 2024-01-30 DIAGNOSIS — G47.10 HYPERSOMNIA: ICD-10-CM

## 2024-01-30 DIAGNOSIS — Z86.73 HISTORY OF CVA (CEREBROVASCULAR ACCIDENT): ICD-10-CM

## 2024-01-30 DIAGNOSIS — D50.0 IRON DEFICIENCY ANEMIA DUE TO CHRONIC BLOOD LOSS: Primary | ICD-10-CM

## 2024-01-30 DIAGNOSIS — Q21.12 PFO (PATENT FORAMEN OVALE): ICD-10-CM

## 2024-01-30 PROCEDURE — 99214 OFFICE O/P EST MOD 30 MIN: CPT | Performed by: INTERNAL MEDICINE

## 2024-01-30 PROCEDURE — 3078F DIAST BP <80 MM HG: CPT | Performed by: INTERNAL MEDICINE

## 2024-01-30 PROCEDURE — 3074F SYST BP LT 130 MM HG: CPT | Performed by: INTERNAL MEDICINE

## 2024-01-30 RX ORDER — AMOXICILLIN 500 MG/1
2000 CAPSULE ORAL PRN
Qty: 4 CAPSULE | Refills: 3 | Status: SHIPPED | OUTPATIENT
Start: 2024-01-30 | End: 2024-02-06

## 2024-01-30 NOTE — PATIENT INSTRUCTIONS
Please be informed that if you contact our office outside of normal business hours the physician on call cannot help with any scheduling or rescheduling issues, procedure instruction questions or any type of medication issue.    We advise you for any urgent/emergency that you go to the nearest emergency room!    PLEASE CALL OUR OFFICE DURING NORMAL BUSINESS HOURS    Monday - Friday   8 am to 5 pm    [place]: [phone]    [place]: [phone]    [place]:  [phone]  **It is YOUR responsibilty to bring medication bottles and/or updated medication list to EACH APPOINTMENT. This will allow us to better serve you and all your healthcare needs**  Thank you for allowing us to care for you today!   We want to ensure we can follow your treatment plan and we strive to give you the best outcomes and experience possible.   If you ever have a life threatening emergency and call 911 - for an ambulance (EMS)   Our providers can only care for you at:   [facility] or [facility].   Even if you have someone take you or you drive yourself we can only care for you in a [facility] facility. Our providers are not setup at the other healthcare locations!   We are committed to providing you the best care possible.    If you receive a survey after visiting one of our offices, please take time to share your experience concerning your physician office visit.  These surveys are confidential and no health information about you is shared.    We are eager to improve for you and we are counting on your feedback to help make that happen.

## 2024-01-30 NOTE — PROGRESS NOTES
CARDIOLOGY  NOTE    Chief Complaint: CVA    HPI:   Simona is a 63 y.o. year old who has Past medical history as noted below.  Very pleasant lady who comes in for evaluation she is a pharmacy technician at [employer] she unfortunately developed episode of vertigo associated with dizziness and vomiting in January 2023 work-up in ED showed a left-sided cerebellar stroke but an MRI showed bilateral cerebellar strokes at [facility] she wore a 30-day monitor showed no afib  SRINIVASA showed significant right to left shunt with a PFO with aneurysmal or mobile septum.  She was evaluated by neurology and referred to cardiology for possible PFO closure if appropriate  She underwent PFo closure in jan 2024 using [place] device 28 mm   Hematology work up was also negative   She had Gi bleed in Nov 2023 endoscopy revealed hernia and ulcer she is on Protonix   She has history of hypertension for which she takes hydrochlorothiazide when she is not on it her blood pressure goes up to 160s with generally in 130s and 140s  Her sister also had a stroke who is actually 5 years younger and had a PFO which was closed  She denies any shortness of breath she is back to baseline underwent rehab and is back at work  She was evaluated by Dr. Hollis, Dr Cross  and referred for PFO closure discussion      Current Outpatient Medications   Medication Sig Dispense Refill    amoxicillin (AMOXIL) 500 MG capsule Take 4 capsules by mouth as needed (take 1 hr before procedure) 4 capsule 3    clopidogrel (PLAVIX) 75 MG tablet Take 1 tablet by mouth daily 30 tablet 3    Multiple Vitamin (MULTIVITAMIN ADULT PO) Take by mouth daily      OXYGEN Inhale 2 L/min into the lungs at bedtime      aspirin 81 MG chewable tablet Take 1 tablet by mouth daily 30 tablet 3    pantoprazole (PROTONIX) 40 MG tablet Take 1 tablet by mouth every morning (before breakfast) 30 tablet 3    losartan (COZAAR) 50 MG tablet Take 1 tablet by mouth

## 2024-02-01 ENCOUNTER — TELEPHONE (OUTPATIENT)
Dept: CARDIOLOGY CLINIC | Age: 64
End: 2024-02-01

## 2024-02-01 NOTE — TELEPHONE ENCOUNTER
Patient was to have a Coloscopy Dr Rosales  He wanted her to hold her plavix for 5 days   She was advised by Dr Monreal just 1 day   Please advise she is ok waiting for her scope  If she has to.

## 2024-02-01 NOTE — TELEPHONE ENCOUNTER
Wait 3 mths before holding for 5 days her device si new   and then can hold it for 5 days after 3 mths  but continue aspirin

## 2024-02-07 ENCOUNTER — HOSPITAL ENCOUNTER (OUTPATIENT)
Dept: ULTRASOUND IMAGING | Age: 64
Discharge: HOME | End: 2024-02-07
Payer: COMMERCIAL

## 2024-02-07 DIAGNOSIS — K76.89 OTHER SPECIFIED DISEASES OF LIVER: ICD-10-CM

## 2024-02-07 PROCEDURE — 76705 ECHO EXAM OF ABDOMEN: CPT

## 2024-02-12 ENCOUNTER — HOSPITAL ENCOUNTER (OUTPATIENT)
Age: 64
Discharge: HOME | End: 2024-02-12
Payer: COMMERCIAL

## 2024-02-12 LAB
HCT VFR BLD CALC: 34.6 % (ref 37–47)
HEMOGLOBIN: 10.6 GM/DL (ref 12.5–16)

## 2024-02-12 PROCEDURE — 85018 HEMOGLOBIN: CPT

## 2024-02-12 PROCEDURE — 85014 HEMATOCRIT: CPT

## 2024-02-12 PROCEDURE — 36415 COLL VENOUS BLD VENIPUNCTURE: CPT

## 2024-02-13 ENCOUNTER — TELEPHONE (OUTPATIENT)
Dept: CARDIOLOGY CLINIC | Age: 64
End: 2024-02-13

## 2024-02-13 DIAGNOSIS — D64.9 ANEMIA, UNSPECIFIED TYPE: Primary | ICD-10-CM

## 2024-02-13 NOTE — TELEPHONE ENCOUNTER
Spoke with pt regarding labs , pt stated she has not had dark stools , informed to recheck labs in two week , pt voiced understanding.

## 2024-02-13 NOTE — TELEPHONE ENCOUNTER
----- Message from AMANDA Mendoza CNP sent at 2/12/2024  3:08 PM EST -----  Hemoglobin dropped. If she is having dark stools needs to see PCP GI soon. If no dark stools continue current plan and recheck in 2 weeks.   ----- Message -----  From: [facility] Incoming Lab Results From [employer] ([employer])  Sent: 2/12/2024   2:55 PM EST  To: AMANDA Mendoza CNP

## 2024-02-26 ENCOUNTER — HOSPITAL ENCOUNTER (OUTPATIENT)
Age: 64
Discharge: HOME | End: 2024-02-26
Payer: COMMERCIAL

## 2024-02-26 LAB
HCT VFR BLD CALC: 36.5 % (ref 37–47)
HEMOGLOBIN: 11.4 GM/DL (ref 12.5–16)

## 2024-02-26 PROCEDURE — 85018 HEMOGLOBIN: CPT

## 2024-02-26 PROCEDURE — 36415 COLL VENOUS BLD VENIPUNCTURE: CPT

## 2024-02-26 PROCEDURE — 85014 HEMATOCRIT: CPT

## 2024-02-28 ENCOUNTER — TELEPHONE (OUTPATIENT)
Dept: CARDIOLOGY CLINIC | Age: 64
End: 2024-02-28

## 2024-02-28 NOTE — TELEPHONE ENCOUNTER
----- Message from AMANDA Mendoza CNP sent at 2/26/2024  9:47 PM EST -----  Regarding: FW:  Continue current plan recheck in 1 month  ----- Message -----  From: [facility] Incoming Lab Results From [employer] ([employer])  Sent: 2/26/2024   2:23 PM EST  To: AMANDA Mendoza CNP

## 2024-02-28 NOTE — TELEPHONE ENCOUNTER
----- Message from AMANDA Mendoza CNP sent at 2/26/2024  9:47 PM EST -----  Regarding: FW:  Continue current plan recheck in 1 month  ----- Message -----  From: [facility] Incoming Lab Results From [employer] ([employer])  Sent: 2/26/2024   2:23 PM EST  To: AMANDA Mendoza CNP

## 2024-02-28 NOTE — TELEPHONE ENCOUNTER
Patient returned Page's call for lab   Results .   Speech Therapy      Visit Type: Treatment  -  Communication/cognition  Reason for consult: Stroke Protocol    Precautions     - Medical precautions: ; standard precautions.     - Oxygen: room air.      - Lines in chart and on patient reviewed; cautions maintained through out session    - Cognition:         • Expression is verbal.      - Affect/Behavior: alert, cooperative and calm    Current Status:     - Diet: general and thin liquids    SUBJECTIVE  Pt is a 73 year old male with pmhx of paroxysmal A fib, anxiety, HLD who presents to the ED 12/26/22 with R sided weakness and R facial droop. Pt brought via EMS after being found by wife on the floor the evening of 12/26/22 . Pt had difficulty saying words, significant R facial droop, had difficulty following commands.    CTOH (-), MRI suggestive of L MCA CVA. Pt passed dysphagia screen in ED.    12/26/22: Speech-language assessment completed.  12/27/22: Pt received upright EOB. Family present for session. Continues with some impulsivity and agitation, sitter present. Pt is calm and cooperative during SLP session.  12/28/22: Pt received upright in bed. Sleeping upon arrival. Continues with sitter. Requires moderate encouragement to participate this AM.  12/29/22: Pt asleep upon arrival, however roused easily. Cooperative and agreeable to participate. Sitter discontinued 12/28/22.Patient agreed to participate in therapy this date.  Patient verbally agrees to allow the following to be present during the session:  Spouse and relative   Patient/family goals: return to previous functional status     OBJECTIVE     Oral Mechanism   Oral Motor Assessment: impaired  Facial     - Symmetry: • Right: impaired    - Range of Motion: • Right: impaired    - Strength: • Right: impaired  Labial     - Symmetry: • Right: impaired    - Range of Motion: • Right: impaired    - Strength: • Right: impaired  Lingual    - Symmetry: • Right: impaired    - Range of Motion: • Right: impaired    -  Strength: • Right: impaired  Saliva Control: intact             Communication/Cognition  Reading Comprehension:      - Match picture to word intact (>90% accuracy)    - Words: mild impairment (75-89% accuracy)    - Sentences: moderate impairment (50-74% accuracy)    - Functional reading: severe impairment (25-49% accuracy)    - Effective techniques: visual guide    - Slow rate, increased time and self repeating stimuli assisted in increasing accuracy with reading at the sentence level.                     ASSESSMENT  Impairments: verbal expression and motor speech/speech intelligibility  Functional Limitations: communication/cognition and expression of ideas/needs    Patient's overall level of function is below baseline for communication skills in the setting of Lt MCA CVA. Session emphasized assessment of reading comprehension skills.     Pt asleep upon arrival though roused easily. He was appropriate and cooperative with all therapeutic tasks. Patient demonstrating skills intact for matching single word to photo. Mild difficulty with reading at the single word level when options were semantically similar. Moderate difficulty with reading at the sentence level. Severe difficulty with reading of functional materials. Slow rate, use of reading guide, increased time and self repeating stimuli assisted in increasing accuracy with reading at the sentence level. Patient additionally noted to self correct and independently use strategies introduced as the session progressed.     Pt will greatly benefit from ongoing skilled ST intervention to address decline from baseline and facilitate safe transition to least restrictive environment.    Progress: Progressing toward goals       • Rehab Potential: good     • Potential barriers to progress:  Apraxia, language skills, severity of deficits and impulsivity     • Skilled therapy is required to address these limitations in attempt to maximize the patient's  independence.    Education:   - Present and ready to learn: patient  Education provided during session:  - aphasia, apraxia, dysphagia and role of SLP  - Results of above outlined education: Needs reinforcement    Patient at end of session:    - location: in bed    - safety measures: alarm system in place/re-engaged and call light within reach    - hand off to: nurse and family/caregiver    PLAN  Plan for next session: expressive language, graphic skills, motor speech, reading comprehension    Frequency: x4-5/week, speech-language skills     Discharge Recommendations  SLP Identified Barriers to Discharge: level of care, medical  Recommendation for Discharge Location: SLP [place]: Post-acute facility with therapy needs, Intensive therapy/oversight of PMR physician  Recommendation for Discharge Support: SLP [place]: 24 Hour assist, Other (comment) (post acute ST for speech-language impairment)     Interventions:  Communication/cognition therapy and communication/cognition evaluation    Plan/Goal Agreement:  Patient agrees with goals and individualized plan of care    RECOMMENDATIONS     -Communication Cognition:          *Patient continues to demonstrate acute communication and cognition deficits, will continue to follow.  Patient would benefit from intensive rehab program.      GOALS  Review Date: 1/2/2023  Long Term Goals:   1.  Pt will complete confrontational naming given phonemic and contextual cues with 60% accuracy.     2.  Pt will communicate basic want/need utilizing alternative methods of communication (gestures, low tech communication board)    3. Pt will respond to multiple unit yes/no questions with >80% accuracy.    4. Pt will follow simple body directives with 50% accuracy given max cues.  Documented in the chart in the following areas: Prior Living. Pain. Assessment. Patient education flowsheet      Therapy procedure time and total treatment time can be found documented on the Time Entry flowsheet

## 2024-03-12 ENCOUNTER — OFFICE VISIT (OUTPATIENT)
Dept: NEUROLOGY | Age: 64
End: 2024-03-12
Payer: COMMERCIAL

## 2024-03-12 VITALS
HEART RATE: 64 BPM | HEIGHT: 64 IN | DIASTOLIC BLOOD PRESSURE: 82 MMHG | WEIGHT: 177.8 LBS | BODY MASS INDEX: 30.35 KG/M2 | OXYGEN SATURATION: 96 % | SYSTOLIC BLOOD PRESSURE: 126 MMHG

## 2024-03-12 DIAGNOSIS — Q21.12 PFO (PATENT FORAMEN OVALE): ICD-10-CM

## 2024-03-12 DIAGNOSIS — Z86.73 HISTORY OF ISCHEMIC VERTEBROBASILAR ARTERY CEREBELLAR STROKE: Primary | ICD-10-CM

## 2024-03-12 PROCEDURE — 3079F DIAST BP 80-89 MM HG: CPT | Performed by: NURSE PRACTITIONER

## 2024-03-12 PROCEDURE — 99213 OFFICE O/P EST LOW 20 MIN: CPT | Performed by: NURSE PRACTITIONER

## 2024-03-12 PROCEDURE — 3074F SYST BP LT 130 MM HG: CPT | Performed by: NURSE PRACTITIONER

## 2024-03-12 NOTE — PROGRESS NOTES
prevention.  From a neurological standpoint, she is stable, she would like to be seen on an as-needed basis.  She will continue to follow with cardiology.  Will be happy to see her for any new or worsening neurological symptoms    Return if symptoms worsen or fail to improve.    Edward Nazario, APRN - CNP

## 2024-03-25 ENCOUNTER — HOSPITAL ENCOUNTER (OUTPATIENT)
Age: 64
Discharge: HOME | End: 2024-03-25
Payer: COMMERCIAL

## 2024-03-25 LAB
HCT VFR BLD CALC: 35.1 % (ref 37–47)
HEMOGLOBIN: 11.1 GM/DL (ref 12.5–16)

## 2024-03-25 PROCEDURE — 36415 COLL VENOUS BLD VENIPUNCTURE: CPT

## 2024-03-25 PROCEDURE — 85014 HEMATOCRIT: CPT

## 2024-03-25 PROCEDURE — 85018 HEMOGLOBIN: CPT

## 2024-03-28 ENCOUNTER — TELEPHONE (OUTPATIENT)
Dept: CARDIOLOGY CLINIC | Age: 64
End: 2024-03-28

## 2024-03-28 NOTE — TELEPHONE ENCOUNTER
----- Message from AMANDA Mendoza CNP sent at 3/25/2024  4:00 PM EDT -----  Stable blood count  ----- Message -----  From: [facility] Incoming Lab Results From [employer] ([employer])  Sent: 3/25/2024   3:31 PM EDT  To: AMANDA Mendoza CNP

## 2024-04-23 ENCOUNTER — TELEPHONE (OUTPATIENT)
Dept: CARDIOLOGY CLINIC | Age: 64
End: 2024-04-23

## 2024-04-23 ENCOUNTER — HOSPITAL ENCOUNTER (OUTPATIENT)
Age: 64
Discharge: HOME | End: 2024-04-23
Payer: COMMERCIAL

## 2024-04-23 LAB
HCT VFR BLD CALC: 34.4 % (ref 37–47)
HEMOGLOBIN: 10.6 GM/DL (ref 12.5–16)

## 2024-04-23 PROCEDURE — 85014 HEMATOCRIT: CPT

## 2024-04-23 PROCEDURE — 85018 HEMOGLOBIN: CPT

## 2024-04-23 PROCEDURE — 36415 COLL VENOUS BLD VENIPUNCTURE: CPT

## 2024-04-23 NOTE — TELEPHONE ENCOUNTER
----- Message from AMANDA Mendoza CNP sent at 4/23/2024  3:21 PM EDT -----  Stable continue current plan  ----- Message -----  From: [facility] Incoming Lab Results From [employer] (Epic Adt)  Sent: 4/23/2024   2:58 PM EDT  To: AMANDA Mendoza CNP

## 2024-05-14 RX ORDER — CLOPIDOGREL BISULFATE 75 MG/1
75 TABLET ORAL DAILY
Qty: 90 TABLET | Refills: 1 | Status: SHIPPED | OUTPATIENT
Start: 2024-05-14

## 2024-05-20 ENCOUNTER — HOSPITAL ENCOUNTER (OUTPATIENT)
Age: 64
Discharge: HOME | End: 2024-05-20
Payer: COMMERCIAL

## 2024-05-20 LAB
HCT VFR BLD CALC: 34.4 % (ref 37–47)
HEMOGLOBIN: 11.2 GM/DL (ref 12.5–16)

## 2024-05-20 PROCEDURE — 85014 HEMATOCRIT: CPT

## 2024-05-20 PROCEDURE — 85018 HEMOGLOBIN: CPT

## 2024-06-11 ENCOUNTER — OFFICE VISIT (OUTPATIENT)
Dept: PULMONOLOGY | Age: 64
End: 2024-06-11
Payer: COMMERCIAL

## 2024-06-11 VITALS
HEIGHT: 64 IN | HEART RATE: 79 BPM | WEIGHT: 179.4 LBS | SYSTOLIC BLOOD PRESSURE: 124 MMHG | OXYGEN SATURATION: 89 % | DIASTOLIC BLOOD PRESSURE: 80 MMHG | BODY MASS INDEX: 30.63 KG/M2

## 2024-06-11 DIAGNOSIS — E66.9 OBESITY (BMI 30-39.9): ICD-10-CM

## 2024-06-11 DIAGNOSIS — G47.10 HYPERSOMNIA: ICD-10-CM

## 2024-06-11 DIAGNOSIS — G47.34 SLEEP RELATED HYPOXIA: Primary | ICD-10-CM

## 2024-06-11 PROCEDURE — 3079F DIAST BP 80-89 MM HG: CPT | Performed by: INTERNAL MEDICINE

## 2024-06-11 PROCEDURE — 99214 OFFICE O/P EST MOD 30 MIN: CPT | Performed by: INTERNAL MEDICINE

## 2024-06-11 PROCEDURE — 3074F SYST BP LT 130 MM HG: CPT | Performed by: INTERNAL MEDICINE

## 2024-06-11 RX ORDER — HYDROCHLOROTHIAZIDE 25 MG/1
25 TABLET ORAL DAILY
COMMUNITY
Start: 2024-04-03

## 2024-06-11 ASSESSMENT — ENCOUNTER SYMPTOMS
EYE ITCHING: 0
ABDOMINAL DISTENTION: 0
COUGH: 0
BACK PAIN: 0
ABDOMINAL PAIN: 0
SHORTNESS OF BREATH: 0
EYE DISCHARGE: 0

## 2024-06-11 NOTE — PROGRESS NOTES
Effort: Pulmonary effort is normal.      Breath sounds: Normal breath sounds.   Abdominal:      General: Abdomen is flat.      Palpations: Abdomen is soft.   Musculoskeletal:         General: Normal range of motion.      Cervical back: Normal range of motion and neck supple.   Skin:     General: Skin is warm and dry.   Neurological:      General: No focal deficit present.      Mental Status: She is alert and oriented to person, place, and time.   Psychiatric:         Mood and Affect: Mood normal.         Behavior: Behavior normal.         Radiology: none    Assessment and Plan     Problem List          Respiratory    Sleep related hypoxia - Primary      I'll do a overnight oxymetry         Relevant Orders    Pulse oximetry, overnight       Other    Hypersomnia       Improved          Obesity (BMI 30-39.9)       Advised to loose weight with diet and excercise                  Total time spent for this encounter: 35 mins    Follow-Up:    Return in about 4 weeks (around 7/9/2024) for overnight oxymetry.     Progress notes sent to the referring Provider    Lonny Waite MD MD  6/11/2024  11:41 AM

## 2024-06-17 ENCOUNTER — HOSPITAL ENCOUNTER (OUTPATIENT)
Age: 64
Discharge: HOME | End: 2024-06-17
Payer: COMMERCIAL

## 2024-06-17 ENCOUNTER — TELEPHONE (OUTPATIENT)
Dept: CARDIOLOGY CLINIC | Age: 64
End: 2024-06-17

## 2024-06-17 LAB
HCT VFR BLD CALC: 33.9 % (ref 37–47)
HEMOGLOBIN: 11.3 GM/DL (ref 12.5–16)

## 2024-06-17 PROCEDURE — 85014 HEMATOCRIT: CPT

## 2024-06-17 PROCEDURE — 36415 COLL VENOUS BLD VENIPUNCTURE: CPT

## 2024-06-17 PROCEDURE — 85018 HEMOGLOBIN: CPT

## 2024-06-17 NOTE — TELEPHONE ENCOUNTER
----- Message from AMANDA Mendoza CNP sent at 6/17/2024  3:29 PM EDT -----  stable  ----- Message -----  From: [facility] Incoming Lab Results From [employer] ([employer] Ad[employer])  Sent: 6/17/2024   2:26 PM EDT  To: AMANDA Mendoza CNP

## 2024-06-17 NOTE — TELEPHONE ENCOUNTER
Attempted to call pt in regards to testing results. Left message with call back number at this time                  Component  Ref Range & Units 13:51  (6/17/24) 4 wk ago  (5/20/24) 1 mo ago  (4/23/24) 2 mo ago  (3/25/24) 3 mo ago  (2/26/24) 4 mo ago  (2/12/24) 4 mo ago  (2/12/24)   Hemoglobin  12.5 - 16.0 GM/DL 11.3 Low  11.2 Low  10.6 Low  11.1 Low  11.4 Low  10.6 Low     Hematocrit  37 - 47 % 33.9 Low  34.4 Low  34.4 Low  35.1 Low  36.5 Low   34.6 Low

## 2024-06-18 ENCOUNTER — TELEPHONE (OUTPATIENT)
Dept: CARDIOLOGY CLINIC | Age: 64
End: 2024-06-18

## 2024-07-22 ENCOUNTER — HOSPITAL ENCOUNTER (OUTPATIENT)
Age: 64
Discharge: HOME | End: 2024-07-22
Payer: COMMERCIAL

## 2024-07-22 LAB
HCT VFR BLD CALC: 35.4 % (ref 37–47)
HEMOGLOBIN: 11.6 GM/DL (ref 12.5–16)

## 2024-07-22 PROCEDURE — 36415 COLL VENOUS BLD VENIPUNCTURE: CPT

## 2024-07-22 PROCEDURE — 85018 HEMOGLOBIN: CPT

## 2024-07-22 PROCEDURE — 85014 HEMATOCRIT: CPT

## 2024-07-23 ENCOUNTER — TELEPHONE (OUTPATIENT)
Dept: CARDIOLOGY CLINIC | Age: 64
End: 2024-07-23

## 2024-07-23 NOTE — TELEPHONE ENCOUNTER
----- Message from AMANDA Mendoza CNP sent at 7/22/2024  4:47 PM EDT -----  Continue current plan. Blood count is stable  ----- Message -----  From: [facility] Incoming Lab Results From [employer] ([employer])  Sent: 7/22/2024   4:03 PM EDT  To: AMANDA Mendoza CNP

## 2024-07-23 NOTE — TELEPHONE ENCOUNTER
Called pt in regards to lab results. Pt voiced understanding of current plan and next ov at [facility] time.                      Component  Ref Range & Units 1 d ago  (7/22/24) 1 mo ago  (6/17/24) 2 mo ago  (5/20/24) 3 mo ago  (4/23/24) 4 mo ago  (3/25/24) 4 mo ago  (2/26/24) 5 mo ago  (2/12/24) 5 mo ago  (2/12/24)   Hemoglobin  12.5 - 16.0 GM/DL 11.6 Low  11.3 Low  11.2 Low  10.6 Low  11.1 Low  11.4 Low  10.6 Low     Hematocrit  37 - 47 % 35.4 Low  33.9 Low  34.4 Low  34.4 Low  35.1 Low  36.5 Low

## 2024-08-01 ENCOUNTER — OFFICE VISIT (OUTPATIENT)
Dept: PULMONOLOGY | Age: 64
End: 2024-08-01
Payer: COMMERCIAL

## 2024-08-01 VITALS
BODY MASS INDEX: 31.28 KG/M2 | HEIGHT: 64 IN | SYSTOLIC BLOOD PRESSURE: 100 MMHG | WEIGHT: 183.2 LBS | HEART RATE: 98 BPM | DIASTOLIC BLOOD PRESSURE: 52 MMHG | OXYGEN SATURATION: 97 %

## 2024-08-01 DIAGNOSIS — R00.1 BRADYCARDIA: ICD-10-CM

## 2024-08-01 DIAGNOSIS — E66.9 OBESITY (BMI 30-39.9): Primary | ICD-10-CM

## 2024-08-01 DIAGNOSIS — G47.34 SLEEP RELATED HYPOXIA: ICD-10-CM

## 2024-08-01 PROCEDURE — 99214 OFFICE O/P EST MOD 30 MIN: CPT | Performed by: NURSE PRACTITIONER

## 2024-08-01 PROCEDURE — 3078F DIAST BP <80 MM HG: CPT | Performed by: NURSE PRACTITIONER

## 2024-08-01 PROCEDURE — 3074F SYST BP LT 130 MM HG: CPT | Performed by: NURSE PRACTITIONER

## 2024-08-01 NOTE — PROGRESS NOTES
Thought content normal.         Judgment: Judgment normal.      ASSESSMENT/PLAN:  1. Obesity (BMI 30-39.9)  Assessment & Plan:   Advised to loose weight with diet and excercise   2. Sleep related hypoxia  Assessment & Plan:    Requires use of oxygen at 2 LPM      3. Bradycardia  Assessment & Plan:   Bradycardia record on overnight pulse oximetry.   Oxygen prescribed at 2 LPM.     Discussed need and benefit of use of supplement oxygen use at night while sleeping.     Vital Signs   BP (!) 100/52   Pulse 98   Ht 1.626 m (5' 4\")   Wt 83.1 kg (183 lb 3.2 oz)   SpO2 97%   BMI 31.45 kg/m²      No follow-ups on file.          5/9/2023    10:49 PM 3/23/2023    11:31 AM   Sleep Medicine   Sitting and reading 3 3   Watching TV 3 3   Sitting, inactive in a public place (e.g. a theatre or a meeting) 2 2   As a passenger in a car for an hour without a break 3 3   Lying down to rest in the afternoon when circumstances permit 3 3   Sitting and talking to someone 0 0   Sitting quietly after a lunch without alcohol 3 3   In a car, while stopped for a few minutes in traffic 0 0   [place] Sleepiness Score 17 17   Neck (Inches) 14.25 14.25     Simona expresses understanding of information. She is agreeable with the plan.           An electronic signature was used to authenticate this note.    --AMANDA Reyes - CNP

## 2024-08-02 PROBLEM — R00.1 BRADYCARDIA: Status: ACTIVE | Noted: 2024-08-02

## 2024-08-06 ENCOUNTER — OFFICE VISIT (OUTPATIENT)
Dept: CARDIOLOGY CLINIC | Age: 64
End: 2024-08-06
Payer: COMMERCIAL

## 2024-08-06 VITALS
HEIGHT: 64 IN | WEIGHT: 186 LBS | SYSTOLIC BLOOD PRESSURE: 120 MMHG | OXYGEN SATURATION: 98 % | HEART RATE: 84 BPM | DIASTOLIC BLOOD PRESSURE: 80 MMHG | BODY MASS INDEX: 31.76 KG/M2

## 2024-08-06 DIAGNOSIS — Z86.73 HISTORY OF CVA (CEREBROVASCULAR ACCIDENT): ICD-10-CM

## 2024-08-06 DIAGNOSIS — Q21.12 PFO (PATENT FORAMEN OVALE): Primary | ICD-10-CM

## 2024-08-06 DIAGNOSIS — D50.9 IRON DEFICIENCY ANEMIA, UNSPECIFIED IRON DEFICIENCY ANEMIA TYPE: ICD-10-CM

## 2024-08-06 DIAGNOSIS — G47.33 OSA (OBSTRUCTIVE SLEEP APNEA): ICD-10-CM

## 2024-08-06 DIAGNOSIS — Z87.74 S/P PATENT FORAMEN OVALE CLOSURE: ICD-10-CM

## 2024-08-06 PROCEDURE — 99214 OFFICE O/P EST MOD 30 MIN: CPT | Performed by: NURSE PRACTITIONER

## 2024-08-06 PROCEDURE — 3079F DIAST BP 80-89 MM HG: CPT | Performed by: NURSE PRACTITIONER

## 2024-08-06 PROCEDURE — 3074F SYST BP LT 130 MM HG: CPT | Performed by: NURSE PRACTITIONER

## 2024-08-06 RX ORDER — FERROUS SULFATE 325(65) MG
325 TABLET ORAL
COMMUNITY

## 2024-08-06 RX ORDER — AMOXICILLIN 500 MG/1
2000 CAPSULE ORAL DAILY PRN
Qty: 8 CAPSULE | Refills: 3 | Status: SHIPPED | OUTPATIENT
Start: 2024-08-06

## 2024-08-06 ASSESSMENT — ENCOUNTER SYMPTOMS
SHORTNESS OF BREATH: 0
COUGH: 0

## 2024-08-06 NOTE — PATIENT INSTRUCTIONS
Please be informed that if you contact our office outside of normal business hours the physician on call cannot help with any scheduling or rescheduling issues, procedure instruction questions or any type of medication issue.    We advise you for any urgent/emergency that you go to the nearest emergency room!    PLEASE CALL OUR OFFICE DURING NORMAL BUSINESS HOURS    Monday - Friday   8 am to 5 pm    [place]: [phone]    [place]: [phone]    [place]:  [phone]  Thank you for allowing us to care for you today!   We want to ensure we can follow your treatment plan and we strive to give you the best outcomes and experience possible.   If you ever have a life threatening emergency and call 911 - for an ambulance (EMS)   Our providers can only care for you at:   [facility] or [facility].   Even if you have someone take you or you drive yourself we can only care for you in a [facility] facility. Our providers are not setup at the other healthcare locations!   **It is YOUR responsibilty to bring medication bottles and/or updated medication list to EACH APPOINTMENT. This will allow us to better serve you and all your healthcare needs**  We are committed to providing you the best care possible.    If you receive a survey after visiting one of our offices, please take time to share your experience concerning your physician office visit.  These surveys are confidential and no health information about you is shared.    We are eager to improve for you and we are counting on your feedback to help make that happen.

## 2024-08-06 NOTE — PROGRESS NOTES
CARDIOLOGY  NOTE    2024    Simona Siu (:  1960) is a 63 y.o. female,an established patient with Dr. Monreal, here for evaluation of the following chief complaint(s):  Follow-up (Pt states dizziness is same as last visit, pt states no other cardiac sx.)        SUBJECTIVE/OBJECTIVE:    HPI  Simona is here to follow up on her cardiovascular health.     She states that she is feeling great. She has not had issues recently.     Simona has a history of GIB, Bradycardia, GERD, CVA, anemia, HTN, Syncope, and obesity    She is a non smoker. She denies any issues with obtaining taking or side effects from medications.       Review of Systems   Constitutional:  Negative for fatigue and fever.   Respiratory:  Negative for cough and shortness of breath.    Cardiovascular:  Negative for chest pain, palpitations and leg swelling.   Musculoskeletal:  Negative for arthralgias and gait problem.   Neurological:  Negative for dizziness, syncope, weakness, light-headedness and headaches.       Vitals:    24 1332   BP: 120/80   Site: Left Upper Arm   Position: Sitting   Cuff Size: Large Adult   Pulse: 84   SpO2: 98%   Weight: 84.4 kg (186 lb)   Height: 1.626 m (5' 4\")       Wt Readings from Last 3 Encounters:   24 84.4 kg (186 lb)   24 83.1 kg (183 lb 3.2 oz)   24 81.4 kg (179 lb 6.4 oz)       BP Readings from Last 3 Encounters:   24 120/80   24 (!) 100/52   24 124/80       Prior to Admission medications    Medication Sig Start Date End Date Taking? Authorizing Provider   hydroCHLOROthiazide (HYDRODIURIL) 25 MG tablet Take 1 tablet by mouth as needed 4/3/24  Yes Provider, MD Serjio   clopidogrel (PLAVIX) 75 MG tablet Take 1 tablet by mouth daily 24  Yes Tanisha Garrido APRN - CNP   Multiple Vitamin (MULTIVITAMIN ADULT PO) Take by mouth daily   Yes Provider, MD Serjio   OXYGEN Inhale 2 L/min into the lungs at bedtime   Yes Provider, Historical, MD   aspirin 81 MG

## 2024-11-05 ENCOUNTER — HOSPITAL ENCOUNTER (OUTPATIENT)
Age: 64
Discharge: HOME | End: 2024-11-05
Payer: COMMERCIAL

## 2024-11-05 LAB
ALBUMIN SERPL-MCNC: 4.1 G/DL (ref 3.4–5)
ALBUMIN/GLOB SERPL: 1.3 {RATIO} (ref 1.1–2.2)
ALP SERPL-CCNC: 94 U/L (ref 40–129)
ALT SERPL-CCNC: 36 U/L (ref 10–40)
ANION GAP SERPL CALCULATED.3IONS-SCNC: 12 MMOL/L (ref 9–17)
AST SERPL-CCNC: 24 U/L (ref 15–37)
BASOPHILS # BLD: 0.02 K/UL
BASOPHILS NFR BLD: 0 % (ref 0–1)
BILIRUB SERPL-MCNC: 0.5 MG/DL (ref 0–1)
BUN SERPL-MCNC: 13 MG/DL (ref 7–20)
CALCIUM SERPL-MCNC: 10 MG/DL (ref 8.3–10.6)
CHLORIDE SERPL-SCNC: 100 MMOL/L (ref 99–110)
CHOLEST SERPL-MCNC: 129 MG/DL (ref 125–199)
CO2 SERPL-SCNC: 25 MMOL/L (ref 21–32)
CREAT SERPL-MCNC: 0.6 MG/DL (ref 0.6–1.2)
EOSINOPHIL # BLD: 0.11 K/UL
EOSINOPHILS RELATIVE PERCENT: 2 % (ref 0–3)
ERYTHROCYTE [DISTWIDTH] IN BLOOD BY AUTOMATED COUNT: 12.6 % (ref 11.7–14.9)
GFR, ESTIMATED: >90 ML/MIN/1.73M2
GLUCOSE SERPL-MCNC: 103 MG/DL (ref 74–99)
HCT VFR BLD AUTO: 36 % (ref 37–47)
HDLC SERPL-MCNC: 70 MG/DL
HGB BLD-MCNC: 11.6 G/DL (ref 12.5–16)
IMM GRANULOCYTES # BLD AUTO: 0.01 K/UL
IMM GRANULOCYTES NFR BLD: 0 %
LDLC SERPL CALC-MCNC: 51 MG/DL
LYMPHOCYTES NFR BLD: 1.48 K/UL
LYMPHOCYTES RELATIVE PERCENT: 29 % (ref 24–44)
MCH RBC QN AUTO: 28.9 PG (ref 27–31)
MCHC RBC AUTO-ENTMCNC: 32.2 G/DL (ref 32–36)
MCV RBC AUTO: 89.8 FL (ref 78–100)
MONOCYTES NFR BLD: 0.45 K/UL
MONOCYTES NFR BLD: 9 % (ref 0–4)
NEUTROPHILS NFR BLD: 60 % (ref 36–66)
NEUTS SEG NFR BLD: 3.12 K/UL
PLATELET # BLD AUTO: 241 K/UL (ref 140–440)
PMV BLD AUTO: 9.6 FL (ref 7.5–11.1)
POTASSIUM SERPL-SCNC: 4.2 MMOL/L (ref 3.5–5.1)
PROT SERPL-MCNC: 7.2 G/DL (ref 6.4–8.2)
RBC # BLD AUTO: 4.01 M/UL (ref 4.2–5.4)
SODIUM SERPL-SCNC: 138 MMOL/L (ref 136–145)
TRIGL SERPL-MCNC: 41 MG/DL
WBC OTHER # BLD: 5.2 K/UL (ref 4–10.5)

## 2024-11-05 PROCEDURE — 83036 HEMOGLOBIN GLYCOSYLATED A1C: CPT

## 2024-11-05 PROCEDURE — 80053 COMPREHEN METABOLIC PANEL: CPT

## 2024-11-05 PROCEDURE — 85025 COMPLETE CBC W/AUTO DIFF WBC: CPT

## 2024-11-05 PROCEDURE — 36415 COLL VENOUS BLD VENIPUNCTURE: CPT

## 2024-11-05 PROCEDURE — 80061 LIPID PANEL: CPT

## 2024-11-07 LAB
EST. AVERAGE GLUCOSE BLD GHB EST-MCNC: 123 MG/DL
HBA1C MFR BLD: 5.9 % (ref 4.2–6.3)

## 2024-12-11 ENCOUNTER — OFFICE VISIT (OUTPATIENT)
Dept: CARDIOLOGY CLINIC | Age: 64
End: 2024-12-11
Payer: COMMERCIAL

## 2024-12-11 VITALS
BODY MASS INDEX: 31.92 KG/M2 | WEIGHT: 187 LBS | DIASTOLIC BLOOD PRESSURE: 80 MMHG | HEIGHT: 64 IN | HEART RATE: 74 BPM | SYSTOLIC BLOOD PRESSURE: 132 MMHG | OXYGEN SATURATION: 98 %

## 2024-12-11 DIAGNOSIS — I10 PRIMARY HYPERTENSION: Primary | ICD-10-CM

## 2024-12-11 DIAGNOSIS — E78.2 MIXED HYPERLIPIDEMIA: ICD-10-CM

## 2024-12-11 DIAGNOSIS — Q21.12 PFO (PATENT FORAMEN OVALE): ICD-10-CM

## 2024-12-11 DIAGNOSIS — Z86.73 HISTORY OF CVA (CEREBROVASCULAR ACCIDENT): ICD-10-CM

## 2024-12-11 PROCEDURE — 99214 OFFICE O/P EST MOD 30 MIN: CPT | Performed by: INTERNAL MEDICINE

## 2024-12-11 PROCEDURE — 3079F DIAST BP 80-89 MM HG: CPT | Performed by: INTERNAL MEDICINE

## 2024-12-11 PROCEDURE — 93000 ELECTROCARDIOGRAM COMPLETE: CPT | Performed by: INTERNAL MEDICINE

## 2024-12-11 PROCEDURE — 3075F SYST BP GE 130 - 139MM HG: CPT | Performed by: INTERNAL MEDICINE

## 2024-12-11 NOTE — PATIENT INSTRUCTIONS
Continue current cardiovascular medications which have been reviewed and discussed individually with you.  SBE prophylaxis. Counseled for calorie counting, reduction in serving size and exercise and lifestyle modification for weight loss. Appropriate prescriptions if needed on this visit are addressed. After visit summery is provided.   Questions answered and patient verbalizes understanding. Follow up in 12 months with ECG,  sooner if needed.

## 2024-12-11 NOTE — PROGRESS NOTES
Simona Siu  1960  Luci Lauren MD      Chief Complaint   Patient presents with    1 Year Follow Up     Pt states no cardiac sx      Chief complaint and HPI:  Simona Siu  is a 64 y.o. female following up for PFO closure she had in January and has hypertension hyperlipidemia history of CVA.  She has gained weight.  Denies any other symptoms.  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  She says she is stress eater reason for the weight gain.  Hospital records are reviewed and medications are reviewed and reconciled.    Rest of the Cardiovascular system review is otherwise unchanged from prior encounter.  Past medical history:  has a past medical history of Acid reflux, Anemia, Arthritis, Backache, Hiatal hernia, History of blood transfusion, History of CVA (cerebrovascular accident), Mixed hyperlipidemia, PFO s/p percutaneous closure 2024, Primary hypertension, Shortness of breath on exertion, Teeth missing, Wears glasses, and [place] teeth extracted.  Past surgical history:  has a past surgical history that includes  section (); Colonoscopy (Last Done In ); [place] tooth extraction; Dilation and curettage of uterus (\"Between  And \"); Tonsillectomy and adenoidectomy (); Cholecystectomy, laparoscopic (); pr laps esophageal lengthening addl (N/A, 10/11/2018); hernia repair (10/11/2018); Upper gastrointestinal endoscopy (N/A, 11/10/2023); other surgical history (N/A, 2024); and Cardiac procedure (N/A, 2024).  Social History:   Social History     Tobacco Use    Smoking status: Never    Smokeless tobacco: Never   Substance Use Topics    Alcohol use: Not Currently     Comment: caffeine: none     Family history: family history includes Arthritis in her mother; Breast Cancer in her maternal aunt and sister; Cancer in her father and sister; Early Death (age of onset: 47) in her sister; Heart Attack in her father; Heart Disease in her father; High Blood Pressure in her

## 2024-12-11 NOTE — ASSESSMENT & PLAN NOTE
Controlled on current combination of losartan and hydrochloric thiazide however she takes HydroDIURIL on as-needed basis if her systolic blood pressure is more than 140 which she has not taken for some time.

## 2025-01-14 ENCOUNTER — TELEPHONE (OUTPATIENT)
Dept: CARDIOLOGY CLINIC | Age: 65
End: 2025-01-14

## 2025-01-14 NOTE — TELEPHONE ENCOUNTER
LMx1      Echo (TTE) limited     Limited echo to evaluate PFO closure.    Left Ventricle: Normal left ventricular systolic function with a visually estimated EF of 55 - 60%.    Mitral Valve: Mild regurgitation.    Tricuspid Valve: Mild regurgitation. The estimated RVSP is 36 mmHg.    Interatrial Septum: Patent Foramen Ovale Closure using [place] device 28 mm ( inserted 1/2024) appears intact.    Pericardium: No pericardial effusion.    Image quality is good.

## 2025-02-13 ENCOUNTER — HOSPITAL ENCOUNTER (OUTPATIENT)
Age: 65
Discharge: HOME | End: 2025-02-13
Payer: COMMERCIAL

## 2025-02-13 LAB
HCT VFR BLD AUTO: 38.1 % (ref 37–47)
HGB BLD-MCNC: 12 G/DL (ref 12.5–16)

## 2025-02-13 PROCEDURE — 85014 HEMATOCRIT: CPT

## 2025-02-13 PROCEDURE — 36415 COLL VENOUS BLD VENIPUNCTURE: CPT

## 2025-02-13 PROCEDURE — 85018 HEMOGLOBIN: CPT

## 2025-02-16 ENCOUNTER — HOSPITAL ENCOUNTER (EMERGENCY)
Age: 65
Discharge: HOME | End: 2025-02-16
Payer: COMMERCIAL

## 2025-02-16 ENCOUNTER — APPOINTMENT (OUTPATIENT)
Dept: GENERAL RADIOLOGY | Age: 65
End: 2025-02-16
Payer: COMMERCIAL

## 2025-02-16 VITALS
SYSTOLIC BLOOD PRESSURE: 140 MMHG | TEMPERATURE: 97.6 F | HEART RATE: 89 BPM | OXYGEN SATURATION: 100 % | RESPIRATION RATE: 18 BRPM | DIASTOLIC BLOOD PRESSURE: 92 MMHG

## 2025-02-16 DIAGNOSIS — S82.832A OTHER CLOSED FRACTURE OF DISTAL END OF LEFT FIBULA, INITIAL ENCOUNTER: Primary | ICD-10-CM

## 2025-02-16 PROCEDURE — 73610 X-RAY EXAM OF ANKLE: CPT

## 2025-02-16 PROCEDURE — 6370000000 HC RX 637 (ALT 250 FOR IP): Performed by: PHYSICIAN ASSISTANT

## 2025-02-16 PROCEDURE — 29515 APPLICATION SHORT LEG SPLINT: CPT

## 2025-02-16 PROCEDURE — 99283 EMERGENCY DEPT VISIT LOW MDM: CPT

## 2025-02-16 RX ORDER — IBUPROFEN 400 MG/1
800 TABLET, FILM COATED ORAL ONCE
Status: COMPLETED | OUTPATIENT
Start: 2025-02-16 | End: 2025-02-16

## 2025-02-16 RX ORDER — HYDROCODONE BITARTRATE AND ACETAMINOPHEN 5; 325 MG/1; MG/1
1 TABLET ORAL EVERY 6 HOURS PRN
Qty: 12 TABLET | Refills: 0 | Status: SHIPPED | OUTPATIENT
Start: 2025-02-16 | End: 2025-02-19

## 2025-02-16 RX ADMIN — IBUPROFEN 800 MG: 400 TABLET, FILM COATED ORAL at 13:43

## 2025-02-16 ASSESSMENT — LIFESTYLE VARIABLES
HOW MANY STANDARD DRINKS CONTAINING ALCOHOL DO YOU HAVE ON A TYPICAL DAY: PATIENT DOES NOT DRINK
HOW OFTEN DO YOU HAVE A DRINK CONTAINING ALCOHOL: NEVER

## 2025-02-16 ASSESSMENT — PAIN SCALES - GENERAL: PAINLEVEL_OUTOF10: 4

## 2025-02-16 NOTE — ED PROVIDER NOTES
[facility] EMERGENCY DEPARTMENT  EMERGENCY DEPARTMENT ENCOUNTER        Pt Name: Simona Siu  MRN: [id number]  Birthdate 1960  Date of evaluation: 2025  Provider: Rogelio Alford PA-C  PCP: Luci Lauren MD  Note Started: 1:41 PM EST 25      TE. I have evaluated this patient.        CHIEF COMPLAINT       Chief Complaint   Patient presents with    Ankle Pain       HISTORY OF PRESENT ILLNESS: 1 or more Elements     History From: patient    Limitations to history : None    Social Determinants Significantly Affecting Health : None    Chief Complaint: left ankle pain/fall    Simona Siu is a 64 y.o. female who presents complaining of left ankle pain after a fall this morning.  Patient states she slipped on ice outside her home and her ankle went behind her.  She has pain in the left lateral ankle.  She took Tylenol prior to arrival and drove herself here.  Denies prior injury to this ankle.  Denies open wounds, weakness, paresthesia, other injuries, head pain, neck pain.     Nursing Notes were all reviewed and agreed with or any disagreements were addressed in the HPI.    REVIEW OF SYSTEMS :      Review of Systems   All other systems reviewed and are negative.      Positives and Pertinent negatives as per HPI.     SURGICAL HISTORY     Past Surgical History:   Procedure Laterality Date    CARDIAC PROCEDURE N/A 2024    Patent foramen ovale closure performed by Gio Ulloa MD at [facility] CARDIAC CATH LAB     SECTION      CHOLECYSTECTOMY, LAPAROSCOPIC      COLONOSCOPY  Last Done In 2016    DILATION AND CURETTAGE OF UTERUS  \"Between  And \"    HERNIA REPAIR  10/11/2018    OTHER SURGICAL HISTORY N/A 2024    PFO closure performed by Dr. Monreal    WI LAPS ESOPHAGEAL LENGTHENING ADDL N/A 10/11/2018    NISSEN FUNDOPLICATION LAPAROSCOPIC ROBOTIC performed by Darcie Cruz MD at [facility] OR    TONSILLECTOMY AND ADENOIDECTOMY  1965    UPPER

## 2025-02-16 NOTE — ED TRIAGE NOTES
Patient presents to the ED with complaint of left ankle injury. Patient states she slipped on ice and hurt her ankle.

## 2025-02-19 ENCOUNTER — TELEPHONE (OUTPATIENT)
Dept: ORTHOPEDIC SURGERY | Age: 65
End: 2025-02-19

## 2025-02-19 ENCOUNTER — OFFICE VISIT (OUTPATIENT)
Dept: ORTHOPEDIC SURGERY | Age: 65
End: 2025-02-19
Payer: COMMERCIAL

## 2025-02-19 VITALS — HEART RATE: 84 BPM | OXYGEN SATURATION: 99 %

## 2025-02-19 DIAGNOSIS — S82.892A CLOSED FRACTURE OF LEFT ANKLE, INITIAL ENCOUNTER: Primary | ICD-10-CM

## 2025-02-19 PROCEDURE — 99203 OFFICE O/P NEW LOW 30 MIN: CPT | Performed by: STUDENT IN AN ORGANIZED HEALTH CARE EDUCATION/TRAINING PROGRAM

## 2025-02-19 PROCEDURE — 29405 APPL SHORT LEG CAST: CPT | Performed by: STUDENT IN AN ORGANIZED HEALTH CARE EDUCATION/TRAINING PROGRAM

## 2025-02-19 ASSESSMENT — ENCOUNTER SYMPTOMS
VOMITING: 0
SHORTNESS OF BREATH: 0
NAUSEA: 0
WHEEZING: 0
BACK PAIN: 0
SORE THROAT: 0
DIARRHEA: 0
COUGH: 0
COLOR CHANGE: 0

## 2025-02-19 NOTE — PROGRESS NOTES
Patient is a 64 y.o. year old female. Patient is in the office today with LT ankle fracture. Patient states that she injured themselves by falling on ice. Patient states that the injury happened 2/16/25. She was seen in the ED the same day. She was placed in a splint and given crutches. Pain scale  6/10. She denies any new falls or injures.   Occupation: Pharmacy tech at [employer]

## 2025-02-19 NOTE — PROGRESS NOTES
2/19/2025   Chief Complaint   Patient presents with    Fracture     Left fibula        History of Present Illness:                             Simona Siu is a 64 y.o. female      Patient is a 64 y.o. year old female. Patient is in the office today with LT ankle fracture. Patient states that she injured themselves by falling on ice. Patient states that the injury happened 2/16/25. She was seen in the ED the same day. She was placed in a splint and given crutches. Pain scale  6/10. She denies any new falls or injures.   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  She has been nonweightbearing but has had to place a little bit of weight on the heel.  No additional complaints.  No prior ankle injury    Medical History  Patient's medications, allergies, past medical, surgical, social and family histories were reviewed and updated as appropriate.    Past Medical History:   Diagnosis Date    Acid reflux     Anemia     Arthritis     \"Hands, Hips\"    Backache     \"Sometimes, I Go To A Chriopractor Once A Month\"    Hiatal hernia     History of blood transfusion Last Infusion 6-18    No Reaction To Blood Transfusions Received    History of CVA (cerebrovascular accident) 03/15/2023    1/21/2023 Cerebellar stroke    Mixed hyperlipidemia 03/15/2023    On Atorvastatin 40 mg daily.    PFO s/p percutaneous closure 1/2024 03/15/2023    SRINIVASA at [facility] on 1/24/2023 reported   Normal LV size and systolic function, EF 65-70%.    Normal RV size and systolic function.    Severe left atrial enlargement.    No RA, LA, or ANGEL thrombus.    No significant valvular dysfunction.    Septum is hypermobile, PFO is present with moderate sized right-to-left shunt seen on agitated saline study       Primary hypertension 03/15/2023    Shortness of breath on exertion     Teeth missing     Upper And Lower    Wears glasses     [place] teeth extracted     4 [place] Teeth Extracted In Past     Past Surgical History:

## 2025-02-19 NOTE — PATIENT INSTRUCTIONS
Cast Application - right lower extremity:  An appropriately padded, well molded short-leg cast is applied to the patient.  The patient reports no immediate discomfort from the cast.  Cast care instructions are provided.  Cast Care Instructions:  Inspect the cast regularly.  If it becomes cracked or develops soft spots, contact office.  Inspect skin around the cast regularly.  If skin becomes red or raw around the cast, contact office.  Do not break off rough edges of the cast or trim the cast.  Do not modify the cast.    Do not pull out the padding from the cast.  Do not put foreign objects under the cast.  Do not walk on the cast or place body weight through the cast.  Keep dirt, sand, and powder away from the inside of the cast.  Keep the cast clean and DRY!  Return with any cast problems.   Follow up in 1 week for cast check and new x-rays.

## 2025-02-26 ENCOUNTER — OFFICE VISIT (OUTPATIENT)
Dept: ORTHOPEDIC SURGERY | Age: 65
End: 2025-02-26
Payer: COMMERCIAL

## 2025-02-26 VITALS — HEART RATE: 88 BPM | OXYGEN SATURATION: 98 %

## 2025-02-26 DIAGNOSIS — S82.892A CLOSED FRACTURE OF LEFT ANKLE, INITIAL ENCOUNTER: Primary | ICD-10-CM

## 2025-02-26 PROCEDURE — 99213 OFFICE O/P EST LOW 20 MIN: CPT | Performed by: STUDENT IN AN ORGANIZED HEALTH CARE EDUCATION/TRAINING PROGRAM

## 2025-02-26 ASSESSMENT — ENCOUNTER SYMPTOMS
NAUSEA: 0
COUGH: 0
BACK PAIN: 0
SHORTNESS OF BREATH: 0
COLOR CHANGE: 0
DIARRHEA: 0
WHEEZING: 0
SORE THROAT: 0
VOMITING: 0

## 2025-02-26 NOTE — PROGRESS NOTES
2/26/2025   Chief Complaint   Patient presents with    Follow-up     Left fibula fracture      Update HPI: Patient returns today for reevaluation of her left ankle fracture.  No new issues or complaints.  Tolerated the cast well.  Currently using a knee scooter.    Previous HPI (2/19/2025):                             Simona Siu is a 64 y.o. female      Patient is a 64 y.o. year old female. Patient is in the office today with LT ankle fracture. Patient states that she injured themselves by falling on ice. Patient states that the injury happened 2/16/25. She was seen in the ED the same day. She was placed in a splint and given crutches. Pain scale  6/10. She denies any new falls or injures.   Occupation: Pharmacy tech at [employer]        This my first time seen the patient.  She has been nonweightbearing but has had to place a little bit of weight on the heel.  No additional complaints.  No prior ankle injury    Medical History  Patient's medications, allergies, past medical, surgical, social and family histories were reviewed and updated as appropriate.    Past Medical History:   Diagnosis Date    Acid reflux     Anemia     Arthritis     \"Hands, Hips\"    Backache     \"Sometimes, I Go To A Chriopractor Once A Month\"    Hiatal hernia     History of blood transfusion Last Infusion 6-18    No Reaction To Blood Transfusions Received    History of CVA (cerebrovascular accident) 03/15/2023    1/21/2023 Cerebellar stroke    Mixed hyperlipidemia 03/15/2023    On Atorvastatin 40 mg daily.    PFO s/p percutaneous closure 1/2024 03/15/2023    SRINIVASA at [facility] on 1/24/2023 reported   Normal LV size and systolic function, EF 65-70%.    Normal RV size and systolic function.    Severe left atrial enlargement.    No RA, LA, or ANGEL thrombus.    No significant valvular dysfunction.    Septum is hypermobile, PFO is present with moderate sized right-to-left shunt seen on agitated saline study       Primary hypertension 03/15/2023

## 2025-02-26 NOTE — PROGRESS NOTES
Pt comes in today for a 1 week cast check and x-rays in cast. She was last seen in our office on 2/19/25. Pain is 2-3/10 today. She is using a knee scooter. She denies any new falls or injures.

## 2025-03-12 ENCOUNTER — OFFICE VISIT (OUTPATIENT)
Dept: ORTHOPEDIC SURGERY | Age: 65
End: 2025-03-12
Payer: COMMERCIAL

## 2025-03-12 VITALS — OXYGEN SATURATION: 98 % | HEART RATE: 93 BPM

## 2025-03-12 DIAGNOSIS — S82.892A CLOSED FRACTURE OF LEFT ANKLE, INITIAL ENCOUNTER: Primary | ICD-10-CM

## 2025-03-12 PROCEDURE — 99213 OFFICE O/P EST LOW 20 MIN: CPT | Performed by: STUDENT IN AN ORGANIZED HEALTH CARE EDUCATION/TRAINING PROGRAM

## 2025-03-12 ASSESSMENT — ENCOUNTER SYMPTOMS
VOMITING: 0
COUGH: 0
COLOR CHANGE: 0
NAUSEA: 0
DIARRHEA: 0
BACK PAIN: 0
WHEEZING: 0
SORE THROAT: 0
SHORTNESS OF BREATH: 0

## 2025-03-12 NOTE — PROGRESS NOTES
3/12/2025   Chief Complaint   Patient presents with    Follow-up     Left fibula fracture      Updated HPI: Patient returns today for reevaluation of her left ankle fracture.  She states she is doing well and tolerating the knee scooter and nonweightbearing without issue.  No issues with the cast per the patient.    Previous HPI (2/26/2025): Patient returns today for reevaluation of her left ankle fracture.  No new issues or complaints.  Tolerated the cast well.  Currently using a knee scooter.    Previous HPI (2/19/2025):                             Simona Siu is a 64 y.o. female      Patient is a 64 y.o. year old female. Patient is in the office today with LT ankle fracture. Patient states that she injured themselves by falling on ice. Patient states that the injury happened 2/16/25. She was seen in the ED the same day. She was placed in a splint and given crutches. Pain scale  6/10. She denies any new falls or injures.   Occupation: Pharmacy tech at [employer]        This my first time seen the patient.  She has been nonweightbearing but has had to place a little bit of weight on the heel.  No additional complaints.  No prior ankle injury    Medical History  Patient's medications, allergies, past medical, surgical, social and family histories were reviewed and updated as appropriate.    Past Medical History:   Diagnosis Date    Acid reflux     Anemia     Arthritis     \"Hands, Hips\"    Backache     \"Sometimes, I Go To A Chriopractor Once A Month\"    Hiatal hernia     History of blood transfusion Last Infusion 6-18    No Reaction To Blood Transfusions Received    History of CVA (cerebrovascular accident) 03/15/2023    1/21/2023 Cerebellar stroke    Mixed hyperlipidemia 03/15/2023    On Atorvastatin 40 mg daily.    PFO s/p percutaneous closure 1/2024 03/15/2023    SRINIVASA at [facility] on 1/24/2023 reported   Normal LV size and systolic function, EF 65-70%.    Normal RV size and systolic function.    Severe left atrial

## 2025-03-12 NOTE — PROGRESS NOTES
Pt comes in today for a 3.5 wk FU s/p LT fibula fracture on 2/16/25. She was last seen on 2/26/25. She is using a knee scooter for ambulation. Cast is clean and intact. She denies any new falls or injuries. X-rays were taken in cast.

## 2025-04-02 ENCOUNTER — OFFICE VISIT (OUTPATIENT)
Dept: ORTHOPEDIC SURGERY | Age: 65
End: 2025-04-02
Payer: COMMERCIAL

## 2025-04-02 VITALS — OXYGEN SATURATION: 99 % | HEART RATE: 96 BPM

## 2025-04-02 DIAGNOSIS — S82.892A CLOSED FRACTURE OF LEFT ANKLE, INITIAL ENCOUNTER: Primary | ICD-10-CM

## 2025-04-02 PROCEDURE — 99213 OFFICE O/P EST LOW 20 MIN: CPT | Performed by: STUDENT IN AN ORGANIZED HEALTH CARE EDUCATION/TRAINING PROGRAM

## 2025-04-02 ASSESSMENT — ENCOUNTER SYMPTOMS
DIARRHEA: 0
SORE THROAT: 0
BACK PAIN: 0
WHEEZING: 0
NAUSEA: 0
VOMITING: 0
COLOR CHANGE: 0
COUGH: 0
SHORTNESS OF BREATH: 0

## 2025-04-02 NOTE — PATIENT INSTRUCTIONS
Walking boot and ankle brace given today.   Wear the ankle brace at night.  Progress weight bearing by 25% as tolerated in the boot.   Follow up in 3 weeks.

## 2025-04-02 NOTE — PROGRESS NOTES
4/2/2025   Chief Complaint   Patient presents with    Follow-up     LT fibula fracture      Updated HPI: Patient returns for reevaluation of her left ankle fracture.  Patient is doing well and is eager to come out of the cast today.  No new injuries or complaints    Previous HPI (3/12/2025): Patient returns today for reevaluation of her left ankle fracture.  She states she is doing well and tolerating the knee scooter and nonweightbearing without issue.  No issues with the cast per the patient.    Previous HPI (2/26/2025): Patient returns today for reevaluation of her left ankle fracture.  No new issues or complaints.  Tolerated the cast well.  Currently using a knee scooter.    Previous HPI (2/19/2025):                             Simona Siu is a 64 y.o. female      Patient is a 64 y.o. year old female. Patient is in the office today with LT ankle fracture. Patient states that she injured themselves by falling on ice. Patient states that the injury happened 2/16/25. She was seen in the ED the same day. She was placed in a splint and given crutches. Pain scale  6/10. She denies any new falls or injures.   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  She has been nonweightbearing but has had to place a little bit of weight on the heel.  No additional complaints.  No prior ankle injury    Medical History  Patient's medications, allergies, past medical, surgical, social and family histories were reviewed and updated as appropriate.    Past Medical History:   Diagnosis Date    Acid reflux     Anemia     Arthritis     \"Hands, Hips\"    Backache     \"Sometimes, I Go To A Chriopractor Once A Month\"    Hiatal hernia     History of blood transfusion Last Infusion 6-18    No Reaction To Blood Transfusions Received    History of CVA (cerebrovascular accident) 03/15/2023    1/21/2023 Cerebellar stroke    Mixed hyperlipidemia 03/15/2023    On Atorvastatin 40 mg daily.    PFO s/p

## 2025-04-02 NOTE — PROGRESS NOTES
Pt comes in today for a 6wk FU s/p LT fibula fx on 2/16/25. Last seen in our office on 3/12/25. Pain is 0/10 today. Cast is clean and intact. She is using a knee scooter. She denies any new falls or injures. X-rays were taken in cast.

## 2025-04-16 ENCOUNTER — TELEPHONE (OUTPATIENT)
Dept: ORTHOPEDIC SURGERY | Age: 65
End: 2025-04-16

## 2025-04-16 NOTE — TELEPHONE ENCOUNTER
Pt called asks if you think it would be ok for her to go back to work this Saturday - if you think she should wait she will she wants your opinion

## 2025-04-16 NOTE — TELEPHONE ENCOUNTER
She can go back if she has reached full weightbearing and could be in the walking boot or the lace up ankle brace.  I would recommend that she not lift more than 30 pounds.

## 2025-04-23 ENCOUNTER — OFFICE VISIT (OUTPATIENT)
Dept: ORTHOPEDIC SURGERY | Age: 65
End: 2025-04-23
Payer: COMMERCIAL

## 2025-04-23 VITALS — HEART RATE: 100 BPM | OXYGEN SATURATION: 97 %

## 2025-04-23 DIAGNOSIS — S82.892A CLOSED FRACTURE OF LEFT ANKLE, INITIAL ENCOUNTER: Primary | ICD-10-CM

## 2025-04-23 PROCEDURE — 99213 OFFICE O/P EST LOW 20 MIN: CPT | Performed by: STUDENT IN AN ORGANIZED HEALTH CARE EDUCATION/TRAINING PROGRAM

## 2025-04-23 ASSESSMENT — ENCOUNTER SYMPTOMS
COLOR CHANGE: 0
BACK PAIN: 0
WHEEZING: 0
VOMITING: 0
NAUSEA: 0
SHORTNESS OF BREATH: 0
SORE THROAT: 0
DIARRHEA: 0
COUGH: 0

## 2025-04-23 NOTE — PROGRESS NOTES
9 weeks post injury, left fibula    Has lace up ankle brace and wears it at night. Would like to discontinue boot. Discussed wearing boot while at work for first hour of shift, then wearing only lace up. If she feels unstable in just the lace up brace, she will put boot back on. Would like to discontinue boot entirely.    No new injuries. No pain.  
symmetric.  No additional osseous or soft tissue abnormalities.  No concerning findings in regards to cast.    3 views left ankle status post casting demonstrates minimally displaced lateral malleolus fracture.  Mortise remains symmetric and appropriate tib-fib overlap and clear space is seen.  No additional osseous or soft tissue abnormalities noted.      Office Procedures:  No orders of the defined types were placed in this encounter.      Assessment and Plan    A: Lateral malleolus fracture left ankle    P:   I had a thorough conversation with the patient regarding her new imaging as well as her treatment course.  At this time she can wean herself out of the walking boot into the lace up ankle brace.  She will continue with avoidance of high-impact activity or vigorous exercise but she can do light impact activity and transition fully to the lace up ankle brace.  She will follow-up in 4 weeks and at that time we will hopefully discontinue the brace and lift her restrictions.  She can also wean herself out of the lace up ankle brace when at home if she is not having pain.  All questions answered and patient voices understanding.    Electronically signed by Vadim Juarez DO on 4/23/2025 at 2:18 PM

## 2025-05-20 ENCOUNTER — HOSPITAL ENCOUNTER (OUTPATIENT)
Age: 65
Discharge: HOME | End: 2025-05-20
Payer: COMMERCIAL

## 2025-05-20 LAB
BASOPHILS # BLD: 0.06 K/UL
BASOPHILS NFR BLD: 1 % (ref 0–1)
EOSINOPHIL # BLD: 0.12 K/UL
EOSINOPHILS RELATIVE PERCENT: 2 % (ref 0–3)
ERYTHROCYTE [DISTWIDTH] IN BLOOD BY AUTOMATED COUNT: 12.5 % (ref 11.7–14.9)
HCT VFR BLD AUTO: 35.5 % (ref 37–47)
HGB BLD-MCNC: 11.4 G/DL (ref 12.5–16)
IMM GRANULOCYTES # BLD AUTO: 0.02 K/UL
IMM GRANULOCYTES NFR BLD: 0 %
LYMPHOCYTES NFR BLD: 1.75 K/UL
LYMPHOCYTES RELATIVE PERCENT: 30 % (ref 24–44)
MCH RBC QN AUTO: 29 PG (ref 27–31)
MCHC RBC AUTO-ENTMCNC: 32.1 G/DL (ref 32–36)
MCV RBC AUTO: 90.3 FL (ref 78–100)
MONOCYTES NFR BLD: 0.42 K/UL
MONOCYTES NFR BLD: 7 % (ref 0–5)
NEUTROPHILS NFR BLD: 59 % (ref 36–66)
NEUTS SEG NFR BLD: 3.45 K/UL
PLATELET # BLD AUTO: 285 K/UL (ref 140–440)
PMV BLD AUTO: 10 FL (ref 7.5–11.1)
RBC # BLD AUTO: 3.93 M/UL (ref 4.2–5.4)
WBC OTHER # BLD: 5.8 K/UL (ref 4–10.5)

## 2025-05-20 PROCEDURE — 85025 COMPLETE CBC W/AUTO DIFF WBC: CPT

## 2025-05-27 ENCOUNTER — OFFICE VISIT (OUTPATIENT)
Dept: ORTHOPEDIC SURGERY | Age: 65
End: 2025-05-27
Payer: COMMERCIAL

## 2025-05-27 VITALS — HEART RATE: 77 BPM | OXYGEN SATURATION: 99 % | BODY MASS INDEX: 31.79 KG/M2 | WEIGHT: 185.2 LBS

## 2025-05-27 DIAGNOSIS — S82.892A CLOSED FRACTURE OF LEFT ANKLE, INITIAL ENCOUNTER: Primary | ICD-10-CM

## 2025-05-27 PROCEDURE — 99213 OFFICE O/P EST LOW 20 MIN: CPT | Performed by: STUDENT IN AN ORGANIZED HEALTH CARE EDUCATION/TRAINING PROGRAM

## 2025-05-27 ASSESSMENT — ENCOUNTER SYMPTOMS
BACK PAIN: 0
NAUSEA: 0
VOMITING: 0
DIARRHEA: 0
SORE THROAT: 0
SHORTNESS OF BREATH: 0
WHEEZING: 0
COLOR CHANGE: 0
COUGH: 0

## 2025-05-27 NOTE — PROGRESS NOTES
Pt comes in today for a 14 wk FU s/p LT fibular fx on 2/16/25. She states that she has been using the ankle brace. She has been taking the brace off while at home and doing well. She returned to work on 4/19/25. She reports swelling in her ankle after working an 8 hour shift. She states that the swelling will go down after icing and elevating. She denies any new falls or injures.   
   losartan (COZAAR) 50 MG tablet Take 1 tablet by mouth daily      atorvastatin (LIPITOR) 40 MG tablet Take 1 tablet by mouth nightly       No current facility-administered medications for this visit.     Facility-Administered Medications Ordered in Other Visits   Medication Dose Route Frequency Provider Last Rate Last Admin    sodium chloride flush 0.9 % injection 10 mL  10 mL IntraVENous PRN Sesar Garza MD   10 mL at 07/18/18 1100     Allergies   Allergen Reactions    Codeine Other (See Comments)     \"Chest Pain\"    Environmental/Seasonal     Ultram [Tramadol] Nausea Only         Review of Systems   Constitutional:  Negative for activity change, fatigue and fever.   HENT:  Negative for hearing loss, sneezing and sore throat.    Respiratory:  Negative for cough, shortness of breath and wheezing.    Cardiovascular:  Negative for chest pain, palpitations and leg swelling.   Gastrointestinal:  Negative for diarrhea, nausea and vomiting.   Musculoskeletal:  Negative for arthralgias, back pain, gait problem, joint swelling and myalgias.   Skin:  Negative for color change, pallor, rash and wound.   Neurological:  Negative for weakness, numbness and headaches.   Psychiatric/Behavioral:  Negative for agitation and confusion. The patient is not hyperactive.                                                    Examination:  General Exam:  Vitals: There were no vitals taken for this visit.   Physical Exam  Constitutional:       General: She is not in acute distress.     Appearance: Normal appearance. She is normal weight. She is not ill-appearing.   Eyes:      General:         Right eye: No discharge.         Left eye: No discharge.      Extraocular Movements: Extraocular movements intact.   Cardiovascular:      Rate and Rhythm: Normal rate.   Pulmonary:      Effort: Pulmonary effort is normal. No respiratory distress.      Breath sounds: No wheezing.   Musculoskeletal:         General: No swelling, tenderness, deformity or

## 2025-07-10 ENCOUNTER — HOSPITAL ENCOUNTER (OUTPATIENT)
Dept: WOMENS IMAGING | Age: 65
Discharge: HOME | End: 2025-07-10
Payer: COMMERCIAL

## 2025-07-10 DIAGNOSIS — Z12.39 ENCOUNTER FOR BREAST CANCER SCREENING OTHER THAN MAMMOGRAM: ICD-10-CM

## 2025-07-10 PROCEDURE — 77063 BREAST TOMOSYNTHESIS BI: CPT

## 2025-08-04 ENCOUNTER — OFFICE VISIT (OUTPATIENT)
Dept: PULMONOLOGY | Age: 65
End: 2025-08-04
Payer: COMMERCIAL

## 2025-08-04 VITALS
OXYGEN SATURATION: 98 % | BODY MASS INDEX: 30.56 KG/M2 | WEIGHT: 179 LBS | HEART RATE: 82 BPM | DIASTOLIC BLOOD PRESSURE: 60 MMHG | SYSTOLIC BLOOD PRESSURE: 122 MMHG | HEIGHT: 64 IN

## 2025-08-04 DIAGNOSIS — E66.9 OBESITY (BMI 30-39.9): ICD-10-CM

## 2025-08-04 DIAGNOSIS — G47.34 SLEEP RELATED HYPOXIA: Primary | ICD-10-CM

## 2025-08-04 PROCEDURE — 3074F SYST BP LT 130 MM HG: CPT | Performed by: INTERNAL MEDICINE

## 2025-08-04 PROCEDURE — 99214 OFFICE O/P EST MOD 30 MIN: CPT | Performed by: INTERNAL MEDICINE

## 2025-08-04 PROCEDURE — 3078F DIAST BP <80 MM HG: CPT | Performed by: INTERNAL MEDICINE

## 2025-08-04 ASSESSMENT — ENCOUNTER SYMPTOMS
ABDOMINAL PAIN: 0
COUGH: 0
ABDOMINAL DISTENTION: 0
SHORTNESS OF BREATH: 0
EYE DISCHARGE: 0
BACK PAIN: 0
EYE ITCHING: 0

## 2025-08-27 ENCOUNTER — TELEPHONE (OUTPATIENT)
Dept: CARDIOLOGY CLINIC | Age: 65
End: 2025-08-27

## (undated) DEVICE — PROTECTOR EYE PT SELF ADH NS OPT GRD LF

## (undated) DEVICE — COLUMN DRAPE

## (undated) DEVICE — Device

## (undated) DEVICE — NEEDLE HYPO 23GA L1.5IN TURQ POLYPR HUB S STL THN WALL IM

## (undated) DEVICE — DISCONTINUED USE 394574 SYSTEM PATIENT POSITION PINK 9IN DONUT

## (undated) DEVICE — INTRODUCER SHTH 9FR CANN L23CM DIL TIP 35MM BLK W/O MINI

## (undated) DEVICE — TUBING, SUCTION, 9/32" X 10', STRAIGHT: Brand: MEDLINE

## (undated) DEVICE — SOLUTION IV IRRIG WATER 1000ML POUR BRL 2F7114

## (undated) DEVICE — GLOVE SURG SZ 75 L12IN THK75MIL DK GRN LTX FREE

## (undated) DEVICE — SKIN AFFIX SURG ADHESIVE 72/CS 0.55ML: Brand: MEDLINE

## (undated) DEVICE — DRAPE,REIN 53X77,STERILE: Brand: MEDLINE

## (undated) DEVICE — ARM DRAPE

## (undated) DEVICE — [HIGH FLOW HEATED INSUFFLATOR TUBING,  DO NOT USE IF PACKAGE IS DAMAGED]

## (undated) DEVICE — GLOVE SURG SZ 7 FNGR THK94MIL TRNSLUC YEL LTX HYDRGEL GRP

## (undated) DEVICE — SUTURE VCRL SZ 2-0 L27IN ABSRB VLT L26MM SH 1/2 CIR J317H

## (undated) DEVICE — PINNACLE INTRODUCER SHEATH: Brand: PINNACLE

## (undated) DEVICE — GUIDEWIRE VASC L260CM DIA0.035IN RAD 3MM J TIP L7CM PTFE

## (undated) DEVICE — GLOVE ORANGE PI 7   MSG9070

## (undated) DEVICE — KIT MICROPUNCTURE MINISTICK MAX  4FR X 10CM STIFF .018 NITINOL ECHOGENIC NEEDLE 2.75IN

## (undated) DEVICE — LINER SUCT CANSTR 1500CC SEMI RIG W/ POR HYDROPHOBIC SHUT

## (undated) DEVICE — SUTURE ETHBND EXCEL SZ 2-0 L36IN NONABSORBABLE GRN L26MM SH X523H

## (undated) DEVICE — GOWN,SIRUS,FABRNF,RAGLAN,XL,ST,28/CS: Brand: MEDLINE

## (undated) DEVICE — DIP TUBE, 8": Brand: MEDI-VAC

## (undated) DEVICE — PACK SURG LAP CHOLE

## (undated) DEVICE — SUTURE ETHBND EXCEL SZ 0 L36IN NONABSORBABLE GRN SH L26MM X524H

## (undated) DEVICE — ENDOSCOPY KIT: Brand: MEDLINE INDUSTRIES, INC.

## (undated) DEVICE — CATHETER ANGIO 4FR L100CM S STL NYL MPA 2 W/ 2 SIDE H 3 SEG

## (undated) DEVICE — MONOPOLAR CURVED SCISSORS: Brand: ENDOWRIST

## (undated) DEVICE — SYRINGE MED 50ML LUERLOCK TIP

## (undated) DEVICE — CHLORAPREP 26ML ORANGE

## (undated) DEVICE — SUTURE SZ 0 27IN 5/8 CIR UR-6  TAPER PT VIOLET ABSRB VICRYL J603H

## (undated) DEVICE — CIRCUIT BREATHING SINGLE LIMB AD 72 IN 3 LT 2 FILTER LIMBO

## (undated) DEVICE — CATHETER US 8FR L90CM GRN TIP OVERLAY FOR GE-VIVID I VIVID

## (undated) DEVICE — ADULT HEAD POSITIONER: Brand: DEVON

## (undated) DEVICE — SET TBNG DISP TIP FOR AHTO

## (undated) DEVICE — GOWN,SIRUS,POLYRNF,BRTHSLV,XLN/XL,20/CS: Brand: MEDLINE

## (undated) DEVICE — DRAPE SHEET ULTRAGARD: Brand: MEDLINE

## (undated) DEVICE — CONTROL SYRINGE LUER-LOCK TIP: Brand: MONOJECT

## (undated) DEVICE — PERCLOSE™ PROSTYLE™ SUTURE-MEDIATED CLOSURE AND REPAIR SYSTEM: Brand: PERCLOSE™ PROSTYLE™

## (undated) DEVICE — GUIDEWIRE: Brand: AMPLATZ SUPER STIFF™

## (undated) DEVICE — INTRODUCER SHTH 6FR L12CM DIA0.038IN STD HEMSTAS CLOSE TOL

## (undated) DEVICE — CANNULA SEAL

## (undated) DEVICE — WIRE GUID DIAG PTFE COAT FIX COR STD XIBLE J TIP 7MM

## (undated) DEVICE — ELECTRODE ES AD CRDLSS PT RET REM POLYHESIVE

## (undated) DEVICE — GUIDEWIRE VASC L180CM DIA0.035IN L7CM DIA3MM J TIP PTFE S

## (undated) DEVICE — 3M™ IOBAN™ 2 ANTIMICROBIAL INCISE DRAPE 6650EZ: Brand: IOBAN™ 2

## (undated) DEVICE — GLOVE ORANGE PI 8   MSG9080

## (undated) DEVICE — GOWN,SIRUS,FABRNF,RAGLAN,L,ST,30/CS: Brand: MEDLINE

## (undated) DEVICE — FORCEPS BX L240CM JAW DIA2.8MM L CAP W/ NDL MIC MESH TOOTH

## (undated) DEVICE — SUTURE VCRL SZ 4-0 L18IN ABSRB UD L19MM PS-2 3/8 CIR PRIM J496H

## (undated) DEVICE — Z INACTIVE USE 2660664 SOLUTION IRRIG 3000ML 0.9% SOD CHL USP UROMATIC PLAS CONT

## (undated) DEVICE — TIP COVER ACCESSORY

## (undated) DEVICE — TOWEL,OR,DSP,ST,BLUE,STD,6/PK,12PK/CS: Brand: MEDLINE

## (undated) DEVICE — GLOVE ORANGE PI 7 1/2   MSG9075

## (undated) DEVICE — LINER,SEMI-RIGID,3000CC,50EA/CS: Brand: MEDLINE

## (undated) DEVICE — NEEDLE SPNL L3.5IN PNK HUB S STL REG WALL FIT STYL W/ QNCKE

## (undated) DEVICE — TROCAR ENDOSCP L150MM DIA5MM BLDELSS STBL SL CAM PRT W/